# Patient Record
Sex: FEMALE | Race: WHITE | HISPANIC OR LATINO | Employment: FULL TIME | URBAN - METROPOLITAN AREA
[De-identification: names, ages, dates, MRNs, and addresses within clinical notes are randomized per-mention and may not be internally consistent; named-entity substitution may affect disease eponyms.]

---

## 2017-02-07 ENCOUNTER — ALLSCRIPTS OFFICE VISIT (OUTPATIENT)
Dept: OTHER | Facility: OTHER | Age: 47
End: 2017-02-07

## 2017-02-07 DIAGNOSIS — I10 ESSENTIAL (PRIMARY) HYPERTENSION: ICD-10-CM

## 2017-02-24 ENCOUNTER — ALLSCRIPTS OFFICE VISIT (OUTPATIENT)
Dept: OTHER | Facility: OTHER | Age: 47
End: 2017-02-24

## 2017-07-24 ENCOUNTER — ALLSCRIPTS OFFICE VISIT (OUTPATIENT)
Dept: OTHER | Facility: OTHER | Age: 47
End: 2017-07-24

## 2017-07-26 ENCOUNTER — GENERIC CONVERSION - ENCOUNTER (OUTPATIENT)
Dept: OTHER | Facility: OTHER | Age: 47
End: 2017-07-26

## 2017-08-15 ENCOUNTER — APPOINTMENT (EMERGENCY)
Dept: RADIOLOGY | Facility: HOSPITAL | Age: 47
End: 2017-08-15
Payer: COMMERCIAL

## 2017-08-15 ENCOUNTER — HOSPITAL ENCOUNTER (EMERGENCY)
Facility: HOSPITAL | Age: 47
Discharge: HOME | End: 2017-08-15
Attending: EMERGENCY MEDICINE | Admitting: EMERGENCY MEDICINE
Payer: COMMERCIAL

## 2017-08-15 VITALS
DIASTOLIC BLOOD PRESSURE: 74 MMHG | HEIGHT: 60 IN | HEART RATE: 55 BPM | WEIGHT: 160 LBS | BODY MASS INDEX: 31.41 KG/M2 | RESPIRATION RATE: 18 BRPM | SYSTOLIC BLOOD PRESSURE: 167 MMHG | TEMPERATURE: 97.5 F | OXYGEN SATURATION: 100 %

## 2017-08-15 DIAGNOSIS — M72.2 PLANTAR FASCIITIS: Primary | ICD-10-CM

## 2017-08-15 LAB
ANION GAP BLD CALC-SCNC: 18 MMOL/L (ref 4–13)
BUN BLD-MCNC: 20 MG/DL (ref 5–25)
CA-I BLD-SCNC: 1.15 MMOL/L (ref 1.12–1.32)
CHLORIDE BLD-SCNC: 106 MMOL/L (ref 100–108)
CREAT BLD-MCNC: 0.5 MG/DL (ref 0.6–1.3)
GFR SERPL CREATININE-BSD FRML MDRD: 115 ML/MIN/1.73SQ M
GLUCOSE SERPL-MCNC: 104 MG/DL (ref 65–140)
HCT VFR BLD CALC: 41 % (ref 34.8–46.1)
HGB BLDA-MCNC: 13.9 G/DL (ref 11.5–15.4)
PCO2 BLD: 23 MMOL/L (ref 21–32)
POTASSIUM BLD-SCNC: 3.4 MMOL/L (ref 3.5–5.3)
SODIUM BLD-SCNC: 142 MMOL/L (ref 136–145)
SPECIMEN SOURCE: ABNORMAL

## 2017-08-15 PROCEDURE — 85014 HEMATOCRIT: CPT

## 2017-08-15 PROCEDURE — 99284 EMERGENCY DEPT VISIT MOD MDM: CPT

## 2017-08-15 PROCEDURE — 73630 X-RAY EXAM OF FOOT: CPT | Performed by: EMERGENCY MEDICINE

## 2017-08-15 PROCEDURE — 80047 BASIC METABLC PNL IONIZED CA: CPT

## 2017-08-15 RX ORDER — LISINOPRIL 20 MG/1
20 TABLET ORAL DAILY
COMMUNITY
End: 2018-03-02 | Stop reason: SDUPTHER

## 2017-08-15 RX ORDER — IBUPROFEN 400 MG/1
800 TABLET ORAL ONCE
Status: COMPLETED | OUTPATIENT
Start: 2017-08-15 | End: 2017-08-15

## 2017-08-15 RX ORDER — ACETAMINOPHEN 325 MG/1
650 TABLET ORAL ONCE
Status: COMPLETED | OUTPATIENT
Start: 2017-08-15 | End: 2017-08-15

## 2017-08-15 RX ORDER — IBUPROFEN 800 MG/1
800 TABLET ORAL 3 TIMES DAILY
Qty: 21 TABLET | Refills: 0 | Status: SHIPPED | OUTPATIENT
Start: 2017-08-15 | End: 2018-09-20 | Stop reason: ALTCHOICE

## 2017-08-15 RX ORDER — ACETAMINOPHEN 325 MG/1
TABLET ORAL
Status: COMPLETED
Start: 2017-08-15 | End: 2017-08-15

## 2017-08-15 RX ORDER — ONDANSETRON 4 MG/1
4 TABLET, ORALLY DISINTEGRATING ORAL ONCE
Status: DISCONTINUED | OUTPATIENT
Start: 2017-08-15 | End: 2017-08-15

## 2017-08-15 RX ORDER — ASPIRIN 81 MG/1
81 TABLET ORAL DAILY
COMMUNITY

## 2017-08-15 RX ORDER — AMLODIPINE BESYLATE 5 MG/1
TABLET ORAL
COMMUNITY
Start: 2016-03-08 | End: 2018-03-02

## 2017-08-15 RX ADMIN — ACETAMINOPHEN 650 MG: 325 TABLET, FILM COATED ORAL at 18:45

## 2017-08-15 RX ADMIN — IBUPROFEN 800 MG: 400 TABLET ORAL at 21:49

## 2017-08-15 RX ADMIN — ACETAMINOPHEN 650 MG: 325 TABLET ORAL at 18:45

## 2017-08-26 ENCOUNTER — TRANSCRIBE ORDERS (OUTPATIENT)
Dept: ADMINISTRATIVE | Facility: HOSPITAL | Age: 47
End: 2017-08-26

## 2017-08-26 DIAGNOSIS — M72.2 PLANTAR FASCIAL FIBROMATOSIS: Primary | ICD-10-CM

## 2017-08-26 DIAGNOSIS — M79.671 RIGHT FOOT PAIN: ICD-10-CM

## 2017-09-08 ENCOUNTER — HOSPITAL ENCOUNTER (OUTPATIENT)
Dept: RADIOLOGY | Facility: HOSPITAL | Age: 47
Discharge: HOME | End: 2017-09-08
Payer: COMMERCIAL

## 2017-09-08 DIAGNOSIS — M72.2 PLANTAR FASCIAL FIBROMATOSIS: ICD-10-CM

## 2017-09-08 DIAGNOSIS — M79.671 RIGHT FOOT PAIN: ICD-10-CM

## 2017-09-08 PROCEDURE — 73721 MRI JNT OF LWR EXTRE W/O DYE: CPT

## 2017-09-20 ENCOUNTER — ALLSCRIPTS OFFICE VISIT (OUTPATIENT)
Dept: OTHER | Facility: OTHER | Age: 47
End: 2017-09-20

## 2017-09-29 ENCOUNTER — TRANSCRIBE ORDERS (OUTPATIENT)
Dept: ADMINISTRATIVE | Facility: HOSPITAL | Age: 47
End: 2017-09-29

## 2017-09-29 DIAGNOSIS — N64.4 BREAST TENDERNESS: Primary | ICD-10-CM

## 2017-10-05 ENCOUNTER — HOSPITAL ENCOUNTER (OUTPATIENT)
Dept: MAMMOGRAPHY | Facility: CLINIC | Age: 47
Discharge: HOME | End: 2017-10-05
Payer: COMMERCIAL

## 2017-10-05 DIAGNOSIS — N64.4 BREAST TENDERNESS: ICD-10-CM

## 2017-10-05 PROCEDURE — G0204 DX MAMMO INCL CAD BI: HCPCS

## 2017-10-05 PROCEDURE — G0279 TOMOSYNTHESIS, MAMMO: HCPCS

## 2017-10-26 ENCOUNTER — TRANSCRIBE ORDERS (OUTPATIENT)
Dept: ADMINISTRATIVE | Facility: HOSPITAL | Age: 47
End: 2017-10-26

## 2017-10-26 DIAGNOSIS — R20.2 PARESTHESIA: Primary | ICD-10-CM

## 2017-10-31 ENCOUNTER — HOSPITAL ENCOUNTER (OUTPATIENT)
Dept: NEUROLOGY | Facility: AMBULATORY SURGERY CENTER | Age: 47
Discharge: HOME | End: 2017-10-31
Payer: COMMERCIAL

## 2017-10-31 DIAGNOSIS — G89.29 CHRONIC PAIN IN LEFT FOOT: ICD-10-CM

## 2017-10-31 DIAGNOSIS — M79.672 CHRONIC PAIN IN LEFT FOOT: ICD-10-CM

## 2017-10-31 DIAGNOSIS — R20.2 PARESTHESIA: ICD-10-CM

## 2017-10-31 PROCEDURE — 95911 NRV CNDJ TEST 9-10 STUDIES: CPT

## 2017-10-31 PROCEDURE — 95886 MUSC TEST DONE W/N TEST COMP: CPT

## 2017-11-24 ENCOUNTER — APPOINTMENT (OUTPATIENT)
Dept: LAB | Facility: HOSPITAL | Age: 47
End: 2017-11-24
Attending: PODIATRIST
Payer: COMMERCIAL

## 2017-11-24 ENCOUNTER — TRANSCRIBE ORDERS (OUTPATIENT)
Dept: LAB | Facility: HOSPITAL | Age: 47
End: 2017-11-24

## 2017-11-24 DIAGNOSIS — M72.2 PLANTAR FASCIAL FIBROMATOSIS: ICD-10-CM

## 2017-11-24 DIAGNOSIS — M72.2 PLANTAR FASCIAL FIBROMATOSIS: Primary | ICD-10-CM

## 2017-11-24 LAB
ANION GAP SERPL CALCULATED.3IONS-SCNC: 8 MMOL/L (ref 4–13)
BUN SERPL-MCNC: 22 MG/DL (ref 5–25)
CALCIUM SERPL-MCNC: 8.6 MG/DL (ref 8.3–10.1)
CHLORIDE SERPL-SCNC: 106 MMOL/L (ref 100–108)
CO2 SERPL-SCNC: 25 MMOL/L (ref 21–32)
CREAT SERPL-MCNC: 0.94 MG/DL (ref 0.6–1.3)
ERYTHROCYTE [DISTWIDTH] IN BLOOD BY AUTOMATED COUNT: 13.3 % (ref 11.6–15.1)
GFR SERPL CREATININE-BSD FRML MDRD: 72 ML/MIN/1.73SQ M
GLUCOSE SERPL-MCNC: 110 MG/DL (ref 65–140)
HCG SERPL QL: NEGATIVE
HCT VFR BLD AUTO: 38 % (ref 34.8–46.1)
HGB BLD-MCNC: 13.2 G/DL (ref 11.5–15.4)
MCH RBC QN AUTO: 27.8 PG (ref 26.8–34.3)
MCHC RBC AUTO-ENTMCNC: 34.7 G/DL (ref 31.4–37.4)
MCV RBC AUTO: 80 FL (ref 82–98)
PLATELET # BLD AUTO: 295 THOUSANDS/UL (ref 149–390)
PMV BLD AUTO: 11.6 FL (ref 8.9–12.7)
POTASSIUM SERPL-SCNC: 3.4 MMOL/L (ref 3.5–5.3)
RBC # BLD AUTO: 4.75 MILLION/UL (ref 3.81–5.12)
SODIUM SERPL-SCNC: 139 MMOL/L (ref 136–145)
WBC # BLD AUTO: 11.51 THOUSAND/UL (ref 4.31–10.16)

## 2017-11-24 PROCEDURE — 36415 COLL VENOUS BLD VENIPUNCTURE: CPT

## 2017-11-24 PROCEDURE — 84703 CHORIONIC GONADOTROPIN ASSAY: CPT

## 2017-11-24 PROCEDURE — 85027 COMPLETE CBC AUTOMATED: CPT

## 2017-11-24 PROCEDURE — 80048 BASIC METABOLIC PNL TOTAL CA: CPT

## 2017-11-27 ENCOUNTER — ALLSCRIPTS OFFICE VISIT (OUTPATIENT)
Dept: OTHER | Facility: OTHER | Age: 47
End: 2017-11-27

## 2017-11-28 ENCOUNTER — GENERIC CONVERSION - ENCOUNTER (OUTPATIENT)
Dept: OTHER | Facility: OTHER | Age: 47
End: 2017-11-28

## 2017-11-28 NOTE — PROGRESS NOTES
Assessment    1  Preoperative clearance (V72 84) (Z01 818)   2  Plantar fasciitis, left (728 71) (M72 2)   3  Essential (primary) hypertension (401 9) (I10)   4  Dyslipidemia (272 4) (E78 5)    Plan  Essential (primary) hypertension    · Lisinopril 20 MG Oral Tablet; TAKE 1 TABLET DAILY AS DIRECTED    Discussion/Summary  Surgical Clearance: She is at a LOW risk from a cardiovascular standpoint at this time without any additional cardiac testing  Reevaluation needed, if she should present with symptoms prior to surgery/procedure  Surgical clearance faxed to Dr Moon Lloyd Complaint  Patient here for Pre-Ophaving left foot surgery Dec 5thSimon Arturo Ledbetter will be performing the surgery at [facility]      History of Present Illness  Pre-Op Visit (Brief): The patient is being seen for a preoperative visit  The procedure is a(n) left foot surgery with Margarite Canal  The indication for surgery is left plantar fasciitisDec 5 , 2017  Surgical Risk Assessment:  Prior Anesthesia: She had prior anesthesia-- and-- no prior adverse reaction to general anesthesia  Pertinent Past Medical History: no pertinent past medical history  Exercise Capacity: able to walk four blocks without symptoms-- and-- able to walk two flights of stairs without symptoms  Symptoms: no symptoms,-- no chest pain,-- no cough,-- no dyspnea,-- no edema,-- no palpitations-- and-- no wheezing  Pertinent Family History: no family history of an adverse reaction to anesthesia,-- no aneurysm,-- no sudden early deaths-- and-- no stroke  Living Situation: home is secure and supportive and no post-op concerns with her living situation  Review of Systems   Constitutional: No fever, no chills, feels well, no tiredness, no recent weight gain or weight loss  Eyes: No complaints of eye pain, no red eyes, no eyesight problems, no discharge, no dry eyes, no itching of eyes    ENT: no complaints of earache, no loss of hearing, no nose bleeds, no nasal discharge, no sore throat, no hoarseness  Cardiovascular: No complaints of slow heart rate, no fast heart rate, no chest pain, no palpitations, no leg claudication, no lower extremity edema  Respiratory: No complaints of shortness of breath, no wheezing, no cough, no SOB on exertion, no orthopnea, no PND  Gastrointestinal: No complaints of abdominal pain, no constipation, no nausea or vomiting, no diarrhea, no bloody stools  Genitourinary: No complaints of dysuria, no incontinence, no pelvic pain, no dysmenorrhea, no vaginal discharge or bleeding  Musculoskeletal: No complaints of arthralgias, no myalgias, no joint swelling or stiffness, no limb pain or swelling  Integumentary: No complaints of skin rash or lesions, no itching, no skin wounds, no breast pain or lump  Neurological: No complaints of headache, no confusion, no convulsions, no numbness, no dizziness or fainting, no tingling, no limb weakness, no difficulty walking  Psychiatric: Not suicidal, no sleep disturbance, no anxiety or depression, no change in personality, no emotional problems  Endocrine: No complaints of proptosis, no hot flashes, no muscle weakness, no deepening of the voice, no feelings of weakness  Hematologic/Lymphatic: No complaints of swollen glands, no swollen glands in the neck, does not bleed easily, does not bruise easily  Active Problems  1  Anxiety (300 00) (F41 9)   2  Bilateral bunions (727 1) (M21 611,M21 612)   3  Breast tenderness (611 71) (N64 4)   4  Dyslipidemia (272 4) (E78 5)   5  Encounter for gynecological examination with abnormal finding (V72 31) (Z01 411)   6  Essential (primary) hypertension (401 9) (I10)   7  Headache (784 0) (R51)   8  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)   9  Menorrhagia (626 2) (N92 0)   10  Plantar fasciitis, left (728 71) (M72 2)   11  Vaginal discharge (623 5) (N89 8)   12   Valvular disease (424 90) (I38)    Past Medical History   · Acute upper respiratory infection (465 9) (J06 9)   · History of Age At First Period 14 Years Old (Menarche)   · History of Congenital heart defect (746 9) (Q24 9)   · History of Dysuria (788 1) (R30 0)   · History of Endometrial polyp (621 0) (N84 0)   · History of  3 (V22 2) (Z33 1)   · History of cardiac disorder (V12 50) (Z86 79)   · History of low back pain (V13 59) (Z87 39)   · History of vaginal discharge (V13 29) (Z87 42)   · History of Missed  (History)   · History of Shortness of breath (786 05) (R06 02)    The active problems and past medical history were reviewed and updated today  Surgical History   · History of  Section   · History of Endometrial Biopsy By Suction   · History of Gynecologic Services Intrauterine Device (IUD) Insertion   · History of Gynecologic Services Intrauterine Device (IUD) Removal   · History of Heart Surgery    The surgical history was reviewed and updated today  Family History  Mother    · No pertinent family history  Father    · No pertinent family history  Maternal Grandmother    · Family history of Heart Disease (V17 49)  Family History    · Family history of Coronary artery disease    The family history was reviewed and updated today  Social History     · Denied: History of Alcohol Use (History)   · Birth Control Method - Condoms   · Birth Control Method - Withdrawal Method   · Currently sexually active   · Daily Coffee Consumption (1  Cups/Day)   · Denied: History of Drug Use   ·    · Native Language [place]ayan   · Never A Smoker   · Sedentary Lifestyle (V69 0)  The social history was reviewed and updated today  Current Meds   1  AmLODIPine Besylate 5 MG Oral Tablet; TAKE 1 TABLET DAILY; Therapy: 81LHM0229 to (EWXYZMRO:12YFP8633)  Requested for: 49Dom4170; Last Rx:16Dty4308 Ordered   2  Aspirin EC 81 MG Oral Tablet Delayed Release; Take one tablet once daily; Therapy: 69SQY3419-  Requested for: 53EDY8426;  Last ZI:09DMU4639 Ordered   3  Lisinopril 10 MG Oral Tablet; TAKE 1 TABLET BY MOUTH EVERY DAY  Requested for: 97Thl7838; Last Rx:22Nye8221 Ordered   4  MetroNIDAZOLE 500 MG Oral Tablet; sig 1 tab bid x 7 days; Therapy: 43IRK9862 to (Evaluate:27Sep2017)  Requested for: 98AVN5968; Last Rx:95Vgz7523 Ordered   5  Pravastatin Sodium 40 MG Oral Tablet; TAKE (1) TABLET DAILY (FOR CHOLESTEROL); Therapy: 08DFF6174 to (Evaluate:20Jan2018)  Requested for: 19Jfx7523; Last Rx:36Dsp3453 Ordered   6  Tramadol-Acetaminophen 37 5-325 MG Oral Tablet; Take 1 tablets for pain every 12 hrs as needed for pain; Therapy: 74BLQ8805 to (Evaluate:20Qyl4272); Last Rx:64Wgf6468 Ordered    The medication list was reviewed and updated today  Allergies  1  No Known Drug Allergies    Vitals   Recorded: 67MZO0130 02:10PM   Temperature 98 3 F   Heart Rate 76   Respiration 18   Systolic 205   Diastolic 94   Height 4 ft 11 84 in   Weight 168 lb    BMI Calculated 32 98   BSA Calculated 1 73   O2 Saturation 96   LMP 47Qnk7654   Pain Scale 0       Physical Exam   Constitutional  General appearance: No acute distress, well appearing and well nourished  Head and Face  Head and face: Normal    Eyes  Conjunctiva and lids: No swelling, erythema or discharge  Pupils and irises: Equal, round, reactive to light  Ears, Nose, Mouth, and Throat  Otoscopic examination: Tympanic membranes translucent with normal light reflex  Canals patent without erythema  Oropharynx: Normal with no erythema, edema, exudate or lesions  Neck  Neck: Supple, symmetric, trachea midline, no masses  Pulmonary  Respiratory effort: No increased work of breathing or signs of respiratory distress  Auscultation of lungs: Clear to auscultation  Cardiovascular  Auscultation of heart: Normal rate and rhythm, normal S1 and S2, no murmurs  Examination of extremities for edema and/or varicosities: Normal    Chest  Chest: Normal    Abdomen  Abdomen: Non-tender, no masses     Lymphatic  Palpation of lymph nodes in neck: No lymphadenopathy  Musculoskeletal  Gait and station: Normal    Skin  Skin and subcutaneous tissue: Normal without rashes or lesions  Neurologic  Cranial nerves: Cranial nerves II-XII intact  Psychiatric  Mood and affect: Normal        Results/Data  No acute ischemia  Rhythm and rate: normal sinus rhythm  P-waves: the P wave is normal   QRS: right bundle branch block  Comparison to prior ECGs: compared to ECG 2/7/17--   no interval change  End of Encounter Meds    1  Pravastatin Sodium 40 MG Oral Tablet; TAKE (1) TABLET DAILY (FOR CHOLESTEROL); Therapy: 67LCR1350 to (ZSYOTKAZ:41YVP2458)  Requested for: 02Ofw5134; Last Rx:05Kdh2740 Ordered    2  Lisinopril 20 MG Oral Tablet; TAKE 1 TABLET DAILY AS DIRECTED  Requested for: 17FBJ4148; Last Rx:27Nov2017 Ordered    3  AmLODIPine Besylate 5 MG Oral Tablet; TAKE 1 TABLET DAILY; Therapy: 02IND8830 to (Evaluate:20Jan2018)  Requested for: 70Hze9742; Last Rx:34Tkp8792 Ordered    4  Tramadol-Acetaminophen 37 5-325 MG Oral Tablet; Take 1 tablets for pain every 12 hrs as needed for pain; Therapy: 84YMU4065 to (Evaluate:33Oas9269); Last Rx:44Oqu8327 Ordered    5  Aspirin EC 81 MG Oral Tablet Delayed Release; Take one tablet once daily; Therapy: 39HGN3227-  Requested for: 01KDN9479; Last Rx:03Nov2015 Ordered    6  MetroNIDAZOLE 500 MG Oral Tablet; sig 1 tab bid x 7 days; Therapy: 29RWP4313 to (Evaluate:53Ucz7514)  Requested for: 84RNN4649; Last Rx:72Ghw2559 Ordered    Future Appointments    Date/Time Provider Specialty Site   01/09/2018 01:30 PM NOREEN Perales  [place] [address]   09/20/2018 01:20 PM NOREEN Mario   Obstetrics/Gynecology [facility]       Signatures   Electronically signed by : Ignacio Nissen, M D ; Nov 27 2017  5:11PM EST                       (Author)

## 2018-01-12 VITALS
OXYGEN SATURATION: 96 % | HEIGHT: 60 IN | DIASTOLIC BLOOD PRESSURE: 94 MMHG | TEMPERATURE: 98.3 F | BODY MASS INDEX: 32.98 KG/M2 | SYSTOLIC BLOOD PRESSURE: 150 MMHG | WEIGHT: 168 LBS | RESPIRATION RATE: 18 BRPM | HEART RATE: 76 BPM

## 2018-01-13 NOTE — MISCELLANEOUS
Message  Return to work or school:   Saud Wall is under my professional care   She was seen in my office on 07/24/2017   She is able to return to work on  07/28/2017            Signatures   Electronically signed by : NOREEN Javier ; Jul 27 2017 10:13AM EST                       (Author)

## 2018-01-14 VITALS
SYSTOLIC BLOOD PRESSURE: 152 MMHG | OXYGEN SATURATION: 98 % | HEART RATE: 73 BPM | DIASTOLIC BLOOD PRESSURE: 90 MMHG | BODY MASS INDEX: 33.49 KG/M2 | RESPIRATION RATE: 20 BRPM | WEIGHT: 166.13 LBS | HEIGHT: 59 IN | TEMPERATURE: 98.4 F

## 2018-01-14 VITALS
HEART RATE: 66 BPM | BODY MASS INDEX: 32.2 KG/M2 | SYSTOLIC BLOOD PRESSURE: 126 MMHG | HEIGHT: 60 IN | WEIGHT: 164 LBS | DIASTOLIC BLOOD PRESSURE: 84 MMHG

## 2018-01-14 VITALS
SYSTOLIC BLOOD PRESSURE: 190 MMHG | WEIGHT: 166 LBS | TEMPERATURE: 97.9 F | HEIGHT: 59 IN | DIASTOLIC BLOOD PRESSURE: 88 MMHG | HEART RATE: 71 BPM | BODY MASS INDEX: 33.47 KG/M2

## 2018-01-14 VITALS
HEIGHT: 59 IN | DIASTOLIC BLOOD PRESSURE: 98 MMHG | SYSTOLIC BLOOD PRESSURE: 150 MMHG | WEIGHT: 168 LBS | BODY MASS INDEX: 33.87 KG/M2

## 2018-03-02 ENCOUNTER — OFFICE VISIT (OUTPATIENT)
Dept: CARDIOLOGY CLINIC | Facility: CLINIC | Age: 48
End: 2018-03-02
Payer: COMMERCIAL

## 2018-03-02 VITALS
HEART RATE: 73 BPM | WEIGHT: 166 LBS | DIASTOLIC BLOOD PRESSURE: 88 MMHG | BODY MASS INDEX: 32.59 KG/M2 | HEIGHT: 60 IN | SYSTOLIC BLOOD PRESSURE: 164 MMHG

## 2018-03-02 DIAGNOSIS — Q21.1 ASD SECUNDUM: Chronic | ICD-10-CM

## 2018-03-02 DIAGNOSIS — I10 HYPERTENSION, ESSENTIAL, BENIGN: Primary | ICD-10-CM

## 2018-03-02 PROBLEM — Q21.11 ASD SECUNDUM: Chronic | Status: ACTIVE | Noted: 2018-03-02

## 2018-03-02 PROCEDURE — 99213 OFFICE O/P EST LOW 20 MIN: CPT | Performed by: INTERNAL MEDICINE

## 2018-03-02 RX ORDER — PRAVASTATIN SODIUM 40 MG
TABLET ORAL
COMMUNITY
Start: 2016-03-08 | End: 2018-04-10 | Stop reason: SDUPTHER

## 2018-03-02 RX ORDER — LISINOPRIL 20 MG/1
20 TABLET ORAL DAILY
Qty: 90 TABLET | Refills: 4 | Status: SHIPPED | OUTPATIENT
Start: 2018-03-02 | End: 2019-07-02 | Stop reason: SDUPTHER

## 2018-03-02 NOTE — PROGRESS NOTES
Cardiology Follow Up    Sheralyn Fothergill  1970  [id number]  HEART & VASCULAR [facility]  [address] [place] [address] [address]    1  Hypertension, essential, benign     2  ASD secundum         Interval History:  Cardiology follow-up  Patient is doing well  Denies any chest pain or dyspnea  She did undergo foot surgery 2 months ago and she has been minimally ambulatory because of that  Unfortunately he did did suffer a fall today while she was coming to the office, fortunately she did not suffer significant trauma  Patient Active Problem List   Diagnosis    ASD secundum     No past medical history on file  Social History     Social History    Marital status: /Civil Union     Spouse name: N/A    Number of children: N/A    Years of education: N/A     Occupational History    Not on file  Social History Main Topics    Smoking status: Never Smoker    Smokeless tobacco: Not on file    Alcohol use No    Drug use: No    Sexual activity: Not on file     Other Topics Concern    Not on file     Social History Narrative    No narrative on file      No family history on file    Past Surgical History:   Procedure Laterality Date    CARDIAC SURGERY         Current Outpatient Prescriptions:     aspirin (ECOTRIN LOW STRENGTH) 81 mg EC tablet, Take 81 mg by mouth daily, Disp: , Rfl:     lisinopril (ZESTRIL) 20 mg tablet, Take 20 mg by mouth daily  , Disp: , Rfl:     pravastatin (PRAVACHOL) 40 mg tablet, Take by mouth, Disp: , Rfl:     ibuprofen (MOTRIN) 800 mg tablet, Take 1 tablet by mouth 3 (three) times a day for 7 days, Disp: 21 tablet, Rfl: 0  No Known Allergies    Labs:  Appointment on 11/24/2017   Component Date Value    WBC 11/24/2017 11 51*    RBC 11/24/2017 4 75     Hemoglobin 11/24/2017 13 2     Hematocrit 11/24/2017 38 0     MCV 11/24/2017 80*    MCH 11/24/2017 27 8     MCHC 11/24/2017 34 7     RDW 11/24/2017 13 3     Platelets 21/85/9447 295     MPV 11/24/2017 11 6     Sodium 11/24/2017 139     Potassium 11/24/2017 3 4*    Chloride 11/24/2017 106     CO2 11/24/2017 25     Anion Gap 11/24/2017 8     BUN 11/24/2017 22     Creatinine 11/24/2017 0 94     Glucose 11/24/2017 110     Calcium 11/24/2017 8 6     eGFR 11/24/2017 72     Preg, Serum 11/24/2017 Negative      Imaging: No results found  Review of Systems:  Review of Systems   Constitutional: Negative for unexpected weight change  Respiratory: Negative for apnea, cough, shortness of breath, wheezing and stridor  Cardiovascular: Negative for chest pain, palpitations and leg swelling  Musculoskeletal: Positive for arthralgias  Neurological: Negative for dizziness and syncope  Physical Exam:  Physical Exam   Constitutional: She appears well-developed and well-nourished  No distress  Cardiovascular: Normal rate, regular rhythm, normal heart sounds and intact distal pulses  Exam reveals no gallop and no friction rub  No murmur heard  Pulmonary/Chest: Effort normal and breath sounds normal  No respiratory distress  She has no wheezes  She has no rales  She exhibits no tenderness  Neurological: She is alert  Skin: She is not diaphoretic  Psychiatric: She has a normal mood and affect  Discussion/Summary:  Congenital heart disease  Status post cardiac surgery at age 31  Possibly ASD closure     Cardiac catheterization 2005 revealed normal coronary arteries  Normal right heart pressures  No evidence of a step-up of oxygenation suggested no evidence of any intracardiac shunting echocardiogram 2015 revealed normal left ventricular and right ventricular systolic functions  And no evidence of volume or pressure overload load of the right heart chambers  Stress test 2015 was negative for ischemia  Continue clinical regimen

## 2018-04-04 DIAGNOSIS — M72.2 PLANTAR FASCIITIS: Primary | ICD-10-CM

## 2018-04-09 ENCOUNTER — OFFICE VISIT (OUTPATIENT)
Dept: MULTI SPECIALTY CLINIC | Facility: CLINIC | Age: 48
End: 2018-04-09
Payer: COMMERCIAL

## 2018-04-09 VITALS
SYSTOLIC BLOOD PRESSURE: 132 MMHG | BODY MASS INDEX: 34.02 KG/M2 | HEART RATE: 84 BPM | TEMPERATURE: 98.1 F | WEIGHT: 173.28 LBS | DIASTOLIC BLOOD PRESSURE: 100 MMHG | HEIGHT: 60 IN

## 2018-04-09 DIAGNOSIS — M72.2 PLANTAR FASCIITIS: ICD-10-CM

## 2018-04-09 PROCEDURE — 99203 OFFICE O/P NEW LOW 30 MIN: CPT | Performed by: PODIATRIST

## 2018-04-09 RX ORDER — PREDNISONE 10 MG/1
TABLET ORAL
Refills: 0 | COMMUNITY
Start: 2018-03-17 | End: 2018-09-20 | Stop reason: ALTCHOICE

## 2018-04-09 RX ORDER — MELOXICAM 7.5 MG/1
TABLET ORAL
Refills: 0 | COMMUNITY
Start: 2018-03-27 | End: 2019-07-02 | Stop reason: ALTCHOICE

## 2018-04-09 NOTE — PROGRESS NOTES
Podiatry Clinic Visit  Corina Shelby 51 y o  female MRN: [id number]  Encounter: [id number]    Assessment/Plan     Assessment:  1  Plantar fasciitis, left    Plan:  - Pt seen and examined  - discussed etiology of plantar fasciitis  Discussed importance of stretching at least 2x daily, and importance of wearing supportive shoes  Advised pt to do stretching exercises first thing in the AM  - c/w PT  - advised pt to obtain OTC orthotics such as powersteps or superfeet  - rtc 1 month for possible surgical scheduling    History of Present Illness     HPI:  Corina Shelby is a 51 y o  female who presents with painful left plantar heel for about 12 months  Pt states that it is most painful in the morning or after a period of rest  Pt has tried injections, OTC Dr John Decker inserts for the pain  Pt has gotten foot surgery with Dr Theodora Lockwood for her tarsal tunnel syndrome 4 months ago which has not helped with the heel pain that she has  She states she had to come to [facility] due to insurance reasons and could not follow up with Dr Theodora Lockwood  Pt states she has been seeing PT for numerous exercises and goes 3x week for the last 2 months with only some improvement in pain  Pt states that she does stretching exercises at home but not in the AM  Pt states she normally wears sneakers  and ambulates to clinic in sneakers    Pt denies recent nausea, vomiting, fever, chills, shortness of breath or chest pain  Consults  Review of Systems   Constitutional: Negative  HENT: Negative  Eyes: Negative  Respiratory: Negative  Cardiovascular: Negative  Gastrointestinal: Negative  Musculoskeletal: left heel pain   Skin: Negative  Neurological: Negative  Historical Information   History reviewed  No pertinent past medical history    Past Surgical History:   Procedure Laterality Date    CARDIAC SURGERY       Social History   History   Alcohol Use No     History   Drug Use No     History   Smoking Status    Never Smoker Smokeless Tobacco    Not on file     Family History: History reviewed  No pertinent family history  Meds/Allergies     (Not in a hospital admission)  No Known Allergies    Objective   First Vitals:   @VSFIRST2(5,8,6,7,9,11,14,10:FIRST)@    Current Vitals:   Blood Pressure: 132/100 (04/09/18 1532)  Pulse: 84 (04/09/18 1532)  Temperature: 98 1 °F (36 7 °C) (04/09/18 1532)  Temp Source: Oral (04/09/18 1532)  Height: 5' (152 4 cm) (04/09/18 1532)  Weight - Scale: 78 6 kg (173 lb 4 5 oz) (04/09/18 1532)        /100 (BP Location: Right arm, Patient Position: Sitting, Cuff Size: Adult)   Pulse 84   Temp 98 1 °F (36 7 °C) (Oral)   Ht 5' (1 524 m)   Wt 78 6 kg (173 lb 4 5 oz)   BMI 33 84 kg/m²     General Appearance:    Alert, cooperative, no distress   Extremities:   MMT is 5/5 to all compartments of the LE, Digital ROM is intact, Pain upon palpation of left plantar tubercle of calcaneus  Equinus deformity noted bilateral   Pulses:   R DP is +2/4, R PT is +1/4, L DP is +2/4, L PT is +1/4, CFT< 3sec to all digits   Skin:   No open lesions, no edema, no erythema, no clinical signs of infection  No maceration in interspaces  Skin is of normal turgor  Surgical cicatrix noted on L medial ankle   Neurologic:    Gross sensation is intact             Imaging: I have personally reviewed pertinent films in PACS

## 2018-04-09 NOTE — PROGRESS NOTES
Assessment/Plan:    No problem-specific Assessment & Plan notes found for this encounter  Diagnoses and all orders for this visit:    Dyslipidemia  -     pravastatin (PRAVACHOL) 40 mg tablet; Take 1 tablet (40 mg total) by mouth daily  -     HEMOGLOBIN A1C W/ EAG ESTIMATION; Future  -     Lipid Panel with Direct LDL reflex; Future  -     Comprehensive metabolic panel; Future    Essential (primary) hypertension  -     HEMOGLOBIN A1C W/ EAG ESTIMATION; Future  -     Lipid Panel with Direct LDL reflex; Future  -     Comprehensive metabolic panel; Future        Get labs will call w results  FU 6 mo     Subjective:   Pt here for follow up visit  don't have any labs done nor ordered  Last office visit was for pre-operative evaluation  Pt has no new issues to discuss     Patient ID: Isabel Lea is a 51 y o  female  HPI  Pt presents for chronic follow up HTN, HLD  She has been under eval and treatment by podiatrist for left plantar pain  She is S/p surgery in Nov 2017  She is doing pt and still out of work due to pain  Htn she is taking lisinoril, she states controled at home  Slightly elavted diastolic today could be due to left foot pain  Needs updated labs    UTD with mammo, PAP by gyn    The following portions of the patient's history were reviewed and updated as appropriate: allergies, current medications, past family history, past medical history, past social history, past surgical history and problem list     Review of Systems   Constitutional: Negative for activity change and appetite change  Respiratory: Negative  Cardiovascular: Negative  Gastrointestinal: Negative  Genitourinary: Negative  Musculoskeletal: Negative for arthralgias  Skin: Negative for rash  Psychiatric/Behavioral: Negative            Objective:      /96   Pulse 79   Temp 98 3 °F (36 8 °C)   Resp (!) 24   Ht 4' 11 45" (1 51 m)   Wt 77 1 kg (170 lb)   SpO2 98%   BMI 33 82 kg/m²          Physical Exam Constitutional: She is oriented to person, place, and time  She appears well-developed and well-nourished  HENT:   Head: Normocephalic  Eyes: Conjunctivae are normal    Cardiovascular: Normal rate, regular rhythm and normal heart sounds  Pulmonary/Chest: Effort normal and breath sounds normal  No respiratory distress  She has no wheezes  She has no rales  Abdominal: Soft  Bowel sounds are normal  She exhibits no distension  There is no tenderness  Neurological: She is alert and oriented to person, place, and time  Psychiatric: She has a normal mood and affect   Her behavior is normal

## 2018-04-10 ENCOUNTER — OFFICE VISIT (OUTPATIENT)
Dept: FAMILY MEDICINE CLINIC | Facility: CLINIC | Age: 48
End: 2018-04-10
Payer: COMMERCIAL

## 2018-04-10 VITALS
BODY MASS INDEX: 34.27 KG/M2 | RESPIRATION RATE: 20 BRPM | OXYGEN SATURATION: 98 % | TEMPERATURE: 98.3 F | SYSTOLIC BLOOD PRESSURE: 148 MMHG | HEIGHT: 59 IN | WEIGHT: 170 LBS | HEART RATE: 79 BPM | DIASTOLIC BLOOD PRESSURE: 96 MMHG

## 2018-04-10 DIAGNOSIS — I10 ESSENTIAL (PRIMARY) HYPERTENSION: ICD-10-CM

## 2018-04-10 DIAGNOSIS — E78.5 DYSLIPIDEMIA: Primary | ICD-10-CM

## 2018-04-10 PROBLEM — E66.9 OBESITY (BMI 30-39.9): Status: ACTIVE | Noted: 2018-04-10

## 2018-04-10 PROBLEM — M72.2 PLANTAR FASCIITIS, LEFT: Status: ACTIVE | Noted: 2017-07-24

## 2018-04-10 PROCEDURE — 99214 OFFICE O/P EST MOD 30 MIN: CPT | Performed by: FAMILY MEDICINE

## 2018-04-10 RX ORDER — PRAVASTATIN SODIUM 40 MG
40 TABLET ORAL DAILY
Qty: 90 TABLET | Refills: 1 | Status: SHIPPED | OUTPATIENT
Start: 2018-04-10 | End: 2019-08-06 | Stop reason: SDUPTHER

## 2018-05-14 ENCOUNTER — OFFICE VISIT (OUTPATIENT)
Dept: MULTI SPECIALTY CLINIC | Facility: CLINIC | Age: 48
End: 2018-05-14
Payer: COMMERCIAL

## 2018-05-14 VITALS
HEIGHT: 60 IN | SYSTOLIC BLOOD PRESSURE: 140 MMHG | DIASTOLIC BLOOD PRESSURE: 100 MMHG | BODY MASS INDEX: 33.07 KG/M2 | TEMPERATURE: 97.9 F | HEART RATE: 80 BPM | WEIGHT: 168.43 LBS

## 2018-05-14 DIAGNOSIS — M72.2 PLANTAR FASCIITIS OF LEFT FOOT: Primary | ICD-10-CM

## 2018-05-14 PROCEDURE — 99213 OFFICE O/P EST LOW 20 MIN: CPT | Performed by: PODIATRIST

## 2018-05-14 RX ORDER — LISINOPRIL 10 MG/1
TABLET ORAL
COMMUNITY
End: 2018-09-20 | Stop reason: SDUPTHER

## 2018-05-14 RX ORDER — AMLODIPINE BESYLATE 5 MG/1
TABLET ORAL
COMMUNITY
End: 2018-09-20 | Stop reason: ALTCHOICE

## 2018-05-14 RX ORDER — LIDOCAINE 50 MG/G
OINTMENT TOPICAL
COMMUNITY
End: 2018-09-20 | Stop reason: ALTCHOICE

## 2018-05-14 RX ORDER — DOXEPIN HYDROCHLORIDE 50 MG/G
CREAM TOPICAL
COMMUNITY
End: 2018-09-20 | Stop reason: ALTCHOICE

## 2018-05-14 RX ORDER — NAPROXEN 500 MG/1
500 TABLET ORAL 2 TIMES DAILY WITH MEALS
Qty: 30 TABLET | Refills: 0 | Status: SHIPPED | OUTPATIENT
Start: 2018-05-14 | End: 2018-09-20 | Stop reason: ALTCHOICE

## 2018-05-14 RX ORDER — DICLOFENAC SODIUM 75 MG/1
TABLET, DELAYED RELEASE ORAL
COMMUNITY
End: 2018-09-20 | Stop reason: ALTCHOICE

## 2018-05-14 RX ORDER — METRONIDAZOLE 500 MG/1
TABLET ORAL
COMMUNITY
End: 2018-09-20 | Stop reason: ALTCHOICE

## 2018-05-14 NOTE — PROGRESS NOTES
Podiatry Clinic Visit  Isabel Lea 51 y o  female MRN: [id number]  Encounter: [id number]    Assessment/Plan     Assessment:  1  Plantar fasciitis, left    Plan:  - Pt seen and examined  - c/w stretching exercises  - advised use of night splint  - advised pt to obtain OTC orthotics such as powersteps or superfeet and to remove sneaker's inserts before inserting otc inserts  - pt may take alleve with meals, denies history of GI issues  - agreeable to plan  - rtc 6 weeks    History of Present Illness     HPI:  Isabel Lea is a 51 y o  female who presents with painful left plantar heel for 12 months  Pt states that it is most painful in the morning or after a period of rest  She was here last month for evaluation of heel pain and notices an improvement in the pain as she has been doing the stretching exercises in the AM  She states she has a night splint that she had gotten previously but only used it 3-4x so far  She is willing to try it more consistently now that she notices an improvement in AM stretching exercise  She states she last saw dr Elif Vaughn 2 weeks ago for her left toenail, but would like to switch providers completely since [facility] is much closer to her house  She states that there is overall an 50% improvement since her worst pain in the last year  Pt willing to continue conservative tx as this time  Pt states she normally wears sneakers and ambulates to clinic in sneakers  Pt denies recent nausea, vomiting, fever, chills, shortness of breath or chest pain  Consults  Review of Systems   Constitutional: Negative  HENT: Negative  Eyes: Negative  Respiratory: Negative  Cardiovascular: Negative  Gastrointestinal: Negative  Musculoskeletal: heel pain  Skin: Negative  Neurological: Negative  Historical Information   No past medical history on file    Past Surgical History:   Procedure Laterality Date    CARDIAC SURGERY       Social History   History   Alcohol Use No     History Drug Use No     History   Smoking Status    Never Smoker   Smokeless Tobacco    Never Used     Family History: No family history on file  Meds/Allergies     (Not in a hospital admission)  No Known Allergies    Objective   First Vitals:   @VSFIRST2(5,8,6,7,9,11,14,10:FIRST)@    Current Vitals:   Blood Pressure: 140/100 (05/14/18 1531)  Pulse: 80 (05/14/18 1531)  Temperature: 97 9 °F (36 6 °C) (05/14/18 1531)  Temp Source: Oral (05/14/18 1531)  Height: 4' 11 75" (151 8 cm) (05/14/18 1531)  Weight - Scale: 76 4 kg (168 lb 6 9 oz) (05/14/18 1531)        /100 (BP Location: Left arm, Patient Position: Sitting, Cuff Size: Standard)   Pulse 80   Temp 97 9 °F (36 6 °C) (Oral)   Ht 4' 11 75" (1 518 m)   Wt 76 4 kg (168 lb 6 9 oz)   BMI 33 17 kg/m²     General Appearance:    Alert, cooperative, no distress   Extremities:   MMT is 5/5 to all compartments of the LE, Digital ROM is intact, Pain upon palpation of left plantar tubercle of calcaneus  Equinus deformity noted bilateral   Pulses:   R DP is +2/4, R PT is +1/4, L DP is +2/4, L PT is +1/4, CFT< 3sec to all digits   Skin:   No open lesions, no edema, no erythema, no clinical signs of infection  No maceration in interspaces  Skin is of normal turgor  Medial left ankle cicatrix  Discoloration to lateral border of left 2nd toenail   Neurologic:    Gross sensation is intact  Protective sensation is intact   Tinel's sign positive on left           Imaging: I have personally reviewed pertinent films in PACS

## 2018-06-25 ENCOUNTER — OFFICE VISIT (OUTPATIENT)
Dept: MULTI SPECIALTY CLINIC | Facility: CLINIC | Age: 48
End: 2018-06-25
Payer: COMMERCIAL

## 2018-06-25 VITALS
BODY MASS INDEX: 33.78 KG/M2 | WEIGHT: 167.55 LBS | HEIGHT: 59 IN | SYSTOLIC BLOOD PRESSURE: 136 MMHG | DIASTOLIC BLOOD PRESSURE: 80 MMHG | TEMPERATURE: 97.2 F | HEART RATE: 84 BPM

## 2018-06-25 DIAGNOSIS — G57.52 TARSAL TUNNEL SYNDROME OF LEFT SIDE: ICD-10-CM

## 2018-06-25 DIAGNOSIS — M72.2 PLANTAR FASCIITIS: Primary | ICD-10-CM

## 2018-06-25 PROCEDURE — 99213 OFFICE O/P EST LOW 20 MIN: CPT | Performed by: PODIATRIST

## 2018-06-25 NOTE — PROGRESS NOTES
Podiatry Clinic Visit  Elroy Olivera 51 y o  female MRN: [id number]  Encounter: [id number]    Assessment/Plan     Assessment:  1  Plantar fasciitis, B/L (L>R)  2  L tarsal tunnel syndrome    Plan:  - Pt seen and examined  - injection performed to left medial heel/ankle consisting of 0 5 of kenalog 40 and 1 5cc of lidocaine without epi  Pt tolerated well  - advised to continue stretching exercises and wear otc arch supports  - Right heel injection will be offered if pain does not improve next visit  - rtc 1 month    History of Present Illness     HPI:  Elroy Olivera is a 51 y o  female who presents with painful left plantar heel  Pt states that it is most painful in the morning or after a period of rest  Pt has tried the stretching exercises and OTC inserts for the pain, which have helped a little bit but does not provide the amount of relief she'd like  She continues to complain of medial ankle pain after tarsal tunnel release surgery 6 months ago  She also states that she is starting to have right heel pain, but the pain is minimal      Pt denies recent nausea, vomiting, fever, chills, shortness of breath or chest pain  Consults  Review of Systems   Constitutional: Negative  HENT: Negative  Eyes: Negative  Respiratory: Negative  Cardiovascular: Negative  Gastrointestinal: Negative  Musculoskeletal: B/l foot pain  Skin: Negative  Neurological: Negative          Historical Information   Past Medical History:   Diagnosis Date    Cardiac disorder     Congenital heart defect     last assessed 3/3/15, resolved 3/3/15    Endometrial polyp      Past Surgical History:   Procedure Laterality Date    CARDIAC SURGERY      valve repair      SECTION      x 2    ENDOMETRIAL BIOPSY      by suction     INSERTION OF INTRAUTERINE DEVICE (IUD)      REMOVAL OF INTRAUTERINE DEVICE (IUD)       Social History   History   Alcohol Use No     History   Drug Use No     History   Smoking Status    Never Smoker   Smokeless Tobacco    Never Used     Family History:   Family History   Problem Relation Age of Onset    No Known Problems Mother     No Known Problems Father     Heart disease Maternal Grandmother     Coronary artery disease Family        Meds/Allergies     (Not in a hospital admission)  No Known Allergies    Objective   First Vitals:   @VSFIRST2(5,8,6,7,9,11,14,10:FIRST)@    Current Vitals: There were no vitals taken for this visit  General Appearance:    Alert, cooperative, no distress   Extremities:   MMT is 5/5 to all compartments of the LE, Digital ROM is intact, Pain upon palpation of left plantar tubercle of calcaneus  Mild pain noted with palpation of the right plantar tubercle of the calcaneus  Equinus deformity noted bilateral   Pulses:   R DP is +2/4, R PT is +1/4, L DP is +2/4, L PT is +1/4, CFT< 3sec to all digits   Skin:   No open lesions, no edema, no erythema, no clinical signs of infection  No maceration in interspaces  Skin is of normal turgor  Surgical cicatrix noted on medial ankle   Neurologic:    Gross sensation is intact  Protective sensation is intact   Positive tinel's sign on left           Imaging: I have personally reviewed pertinent films in PACS

## 2018-07-30 ENCOUNTER — OFFICE VISIT (OUTPATIENT)
Dept: MULTI SPECIALTY CLINIC | Facility: CLINIC | Age: 48
End: 2018-07-30
Payer: COMMERCIAL

## 2018-07-30 VITALS
HEIGHT: 59 IN | HEART RATE: 72 BPM | SYSTOLIC BLOOD PRESSURE: 150 MMHG | DIASTOLIC BLOOD PRESSURE: 100 MMHG | BODY MASS INDEX: 33.87 KG/M2 | WEIGHT: 167.99 LBS | TEMPERATURE: 97.8 F

## 2018-07-30 DIAGNOSIS — M76.72 PERONEAL TENDINITIS OF LEFT LOWER LEG: Primary | ICD-10-CM

## 2018-07-30 DIAGNOSIS — M72.2 PLANTAR FASCIITIS, LEFT: ICD-10-CM

## 2018-07-30 PROCEDURE — 99213 OFFICE O/P EST LOW 20 MIN: CPT | Performed by: PODIATRIST

## 2018-07-30 RX ORDER — DIAPER,BRIEF,INFANT-TODD,DISP
EACH MISCELLANEOUS
COMMUNITY
Start: 2016-06-21 | End: 2018-09-20 | Stop reason: ALTCHOICE

## 2018-07-30 NOTE — PROGRESS NOTES
Podiatry Clinic Visit  Miguel Angel Bahena 51 y o  female MRN: [id number]  Encounter: [id number]    Assessment/Plan     Assessment:  1  Plantar fasciitis, B/L (L>R)  2  Left peroneal tendinitis   3  L tarsal tunnel syndrome    Plan:  - Patient was seen/examined  All questions and concerns addressed  - After verbal consent was obtained, injection performed to left medial-plantar heel consisting of 1 5cc of betamethasone, 0 5cc lidocaine, and 1 0cc marcaine  Pt tolerated procedure well  - Script for PT given  - Excusal for work tomorrow (18)  - F/U 6-8 weeks after PT  History of Present Illness     HPI:  Miguel Angel Bahena is a 51 y o  female who presents with left heel and lateral foot pain  Lateral foot pain started since last appointment as she had been walking differently due to the heel pain  Most painful at first step in the morning  Has tried OTC inserts, supportive shoe gear  Last appointment she had an injection which she states helped with the pain, from a 7/10 to a 5/10  The patient denies any nausea, vomiting, fever, chills, shortness of breath, or chest pains  Review of Systems   Constitutional: Negative  HENT: Negative  Eyes: Negative  Respiratory: Negative  Cardiovascular: Negative  Gastrointestinal: Negative  Musculoskeletal: pain on palpation of left heel   Skin: Negative  Neurological: Negative          Historical Information   Past Medical History:   Diagnosis Date    Cardiac disorder     Congenital heart defect     last assessed 3/3/15, resolved 3/3/15    Endometrial polyp      Past Surgical History:   Procedure Laterality Date    CARDIAC SURGERY      valve repair      SECTION      x 2    ENDOMETRIAL BIOPSY      by suction     INSERTION OF INTRAUTERINE DEVICE (IUD)      REMOVAL OF INTRAUTERINE DEVICE (IUD)       Social History   History   Alcohol Use No     History   Drug Use No     History   Smoking Status    Never Smoker   Smokeless Tobacco    Never Used Family History:   Family History   Problem Relation Age of Onset    No Known Problems Mother     No Known Problems Father     Heart disease Maternal Grandmother     Coronary artery disease Family        Meds/Allergies     (Not in a hospital admission)  No Known Allergies    Objective     Current Vitals:   Blood Pressure: 150/100 (07/30/18 1607)  Pulse: 72 (07/30/18 1607)  Temperature: 97 8 °F (36 6 °C) (07/30/18 1607)  Temp Source: Oral (07/30/18 1607)  Height: 4' 11" (149 9 cm) (07/30/18 1607)  Weight - Scale: 76 2 kg (167 lb 15 9 oz) (07/30/18 1607)        /100 (BP Location: Left arm, Patient Position: Sitting, Cuff Size: Standard)   Pulse 72   Temp 97 8 °F (36 6 °C) (Oral)   Ht 4' 11" (1 499 m)   Wt 76 2 kg (167 lb 15 9 oz)   BMI 33 93 kg/m²       Lower Extremity Exam:    Musculoskeletal:  MMT is 5/5 to all compartments of the LE, +0/4 edema B/L, Digital ROM is intact, Pain upon palpation of left plantar tubercle of calcaneus  Pain on palpation of left peroneal tendon from posterior lateral malleolus to 5th met base  Mild pain noted with palpation of the right plantar tubercle of the calcaneus  Equinus deformity noted bilateral     Pulses:   R DP is +2/4, R PT is +2/4, L DP is +2/4, L PT is +2/4, CFT< 3sec to all digits  Pedal hair is Present     Skin:  No open Lesions  Skin of the LE is normal texture, turgor  Neurologic:  Gross sensation is intact

## 2018-07-30 NOTE — LETTER
July 30, 2018     Patient: Ana Collier   YOB: 1970   Date of Visit: 7/30/2018       To Whom it May Concern:    Vikki Kauffman is under my professional care  She was seen in my office on 7/30/2018  She may return to work on 08/01/18  If you have any questions or concerns, please don't hesitate to call           Sincerely,          Ricky Wallace DPM        CC: No Recipients

## 2018-09-10 ENCOUNTER — TELEPHONE (OUTPATIENT)
Dept: INTERNAL MEDICINE CLINIC | Facility: CLINIC | Age: 48
End: 2018-09-10

## 2018-09-20 ENCOUNTER — ANNUAL EXAM (OUTPATIENT)
Dept: OBGYN CLINIC | Facility: CLINIC | Age: 48
End: 2018-09-20
Payer: COMMERCIAL

## 2018-09-20 VITALS
WEIGHT: 167 LBS | SYSTOLIC BLOOD PRESSURE: 140 MMHG | HEIGHT: 57 IN | DIASTOLIC BLOOD PRESSURE: 80 MMHG | BODY MASS INDEX: 36.03 KG/M2

## 2018-09-20 DIAGNOSIS — R92.2 DENSE BREAST TISSUE: ICD-10-CM

## 2018-09-20 DIAGNOSIS — Z01.419 ENCOUNTER FOR GYNECOLOGICAL EXAMINATION WITHOUT ABNORMAL FINDING: Primary | ICD-10-CM

## 2018-09-20 DIAGNOSIS — Z12.39 SCREENING FOR MALIGNANT NEOPLASM OF BREAST: ICD-10-CM

## 2018-09-20 PROCEDURE — S0612 ANNUAL GYNECOLOGICAL EXAMINA: HCPCS | Performed by: OBSTETRICS & GYNECOLOGY

## 2018-09-20 NOTE — PROGRESS NOTES
Jeffrey Antunez  1970      CC:  Yearly exam    S:  51 y o  female here for yearly exam  Her cycles are irregular, not heavy or crampy  She is sexually active  She uses nothing for contraception  Discussed expectations, when to call  Last Pap 2/10/2015 normal/negative HPV  Last Mammo 10/5/2017 BIRAD-2      Current Outpatient Prescriptions:     aspirin (ECOTRIN LOW STRENGTH) 81 mg EC tablet, Take 81 mg by mouth daily, Disp: , Rfl:     lisinopril (ZESTRIL) 20 mg tablet, Take 1 tablet (20 mg total) by mouth daily, Disp: 90 tablet, Rfl: 4    meloxicam (MOBIC) 7 5 mg tablet, TAKE 1 TABLET BY MOUTH TWICE A DAY WITH MEALS FOR 14 DAYS, Disp: , Rfl: 0    pravastatin (PRAVACHOL) 40 mg tablet, Take 1 tablet (40 mg total) by mouth daily, Disp: 90 tablet, Rfl: 1  Social History     Social History    Marital status: /Civil Union     Spouse name: N/A    Number of children: N/A    Years of education: N/A     Occupational History    Not on file  Social History Main Topics    Smoking status: Never Smoker    Smokeless tobacco: Never Used    Alcohol use No    Drug use: No    Sexual activity: Yes     Partners: Male     Birth control/ protection: None      Comment:      Other Topics Concern    Not on file     Social History Narrative    Daily coffee consumption, (1 cup/day)     Sedentary lifestyle      Family History   Problem Relation Age of Onset    Heart disease Mother     No Known Problems Father     Heart disease Maternal Grandmother     Coronary artery disease Family       Past Medical History:   Diagnosis Date    Cardiac disorder     Congenital heart defect     last assessed 3/3/15, resolved 3/3/15    Endometrial polyp         O:  Blood pressure 140/80, height 4' 9" (1 448 m), weight 75 8 kg (167 lb), last menstrual period 09/12/2018      Patient appears well and is not in distress  Neck is supple without masses  Breasts are symmetrical without mass, tenderness, nipple discharge, skin changes or adenopathy  Abdomen is soft and nontender without masses  External genitals are normal without lesions or rashes  Vagina is normal without discharge or bleeding  Cervix is normal without discharge or lesion  Uterus is normal, mobile, nontender without palpable mass  Adnexa are normal, nontender, without palpable mass  A:  Yearly exam      P:   Pap due 2020   Mammo slip provided    RTO one year for yearly exam or sooner as needed

## 2018-11-19 ENCOUNTER — HOSPITAL ENCOUNTER (OUTPATIENT)
Dept: MAMMOGRAPHY | Facility: CLINIC | Age: 48
Discharge: HOME | End: 2018-11-19
Payer: COMMERCIAL

## 2018-11-19 VITALS — BODY MASS INDEX: 36.03 KG/M2 | HEIGHT: 57 IN | WEIGHT: 167 LBS

## 2018-11-19 DIAGNOSIS — Z12.39 SCREENING FOR MALIGNANT NEOPLASM OF BREAST: ICD-10-CM

## 2018-11-19 DIAGNOSIS — R92.2 DENSE BREAST TISSUE: ICD-10-CM

## 2018-11-19 PROCEDURE — 77063 BREAST TOMOSYNTHESIS BI: CPT

## 2018-11-19 PROCEDURE — 77067 SCR MAMMO BI INCL CAD: CPT

## 2019-02-05 ENCOUNTER — TELEPHONE (OUTPATIENT)
Dept: FAMILY MEDICINE CLINIC | Facility: CLINIC | Age: 49
End: 2019-02-05

## 2019-02-05 NOTE — TELEPHONE ENCOUNTER
Patient called and left a message requesting an appointment to be seen for chest pain  Call was triaged  Patient has been experiencing chest pain and left arm pain for about 4 days  Patient says she sometimes feel short of breath  Patient was advised to go to the ER

## 2019-06-27 NOTE — PROGRESS NOTES
Assessment/Plan:    Hypertension, essential, benign  Patient's blood pressure is elevated  Will restart lisinopril 20 mg daily  Will order labwork and plan to review with patient at a follow-up visit in four weeks at which time we will reassess her blood pressure on medication  Dyslipidemia  Will obtain fasting labwork to assess current status  Will not restart statin medication at this time  Hypokalemia  Patient noted to have mild hypokalemia on multiple prior lab reports  Recommend patient eat a potassium rich diet, and a handout was provided  Will assess with labwork and follow-up to review the results in one month  Diagnoses and all orders for this visit:    Dyslipidemia  -     Lipid Panel with Direct LDL reflex; Future    Hypertension, essential, benign  -     Comprehensive metabolic panel; Future  -     CBC and differential; Future  -     lisinopril (ZESTRIL) 20 mg tablet; Take 1 tablet (20 mg total) by mouth daily    Screening for colon cancer  -     Ambulatory referral to Gastroenterology; Future    Screening for diabetes mellitus  -     Hemoglobin A1C; Future    Weight gain  -     TSH, 3rd generation with Free T4 reflex; Future    Myalgia  -     Vitamin D 25 hydroxy; Future    Hypokalemia          Subjective: Patient is here for yearly follow up   No labs   PHQ done  Health Maintenance Due   Topic Date Due    CRC Screening: Colonoscopy  1970    BMI: Followup Plan  03/26/1987    INFLUENZA VACCINE  07/01/2019        Patient ID: Mo Gaviria is a 47 y o  female  Patient presents to clinic today for a follow-up visit  Patient has a history of hypertension and hyperlipidemia, but has been off medication for many months  She is due for labwork, as her last labwork was done in 2017  She notes to have some muscle pain in the left leg, but otherwise no complaints today  Patient notes that she has had some weight gain over the past year        The following portions of the patient's history were reviewed and updated as appropriate: allergies, current medications, past family history, past medical history, past social history, past surgical history and problem list     Review of Systems   Constitutional: Positive for unexpected weight change (noted weight gain)  Respiratory: Negative for shortness of breath  Cardiovascular: Negative for chest pain  Musculoskeletal: Positive for myalgias (most in left leg)  Neurological: Negative for headaches  Objective:      /82 (BP Location: Left arm, Patient Position: Sitting, Cuff Size: Standard)   Pulse 75   Temp 98 5 °F (36 9 °C) (Oral)   Resp 17   Ht 4' 9" (1 448 m)   Wt 78 5 kg (173 lb)   SpO2 97%   BMI 37 44 kg/m²          Physical Exam   Constitutional: She is oriented to person, place, and time  She appears well-developed and well-nourished  She is cooperative  HENT:   Head: Normocephalic and atraumatic  Right Ear: Hearing, tympanic membrane, external ear and ear canal normal    Left Ear: Hearing, tympanic membrane, external ear and ear canal normal    Mouth/Throat: Uvula is midline, oropharynx is clear and moist and mucous membranes are normal    Eyes: Conjunctivae and lids are normal    Neck: Trachea normal, normal range of motion and phonation normal  Neck supple  No thyromegaly present  Cardiovascular: Normal rate, regular rhythm and normal heart sounds  No murmur heard  Pulmonary/Chest: Effort normal and breath sounds normal  She has no wheezes  She has no rhonchi  She has no rales  Musculoskeletal: Normal range of motion  Lymphadenopathy:     She has no cervical adenopathy  Neurological: She is alert and oriented to person, place, and time  She exhibits normal muscle tone  Gait normal    Skin: Skin is warm, dry and intact  Psychiatric: She has a normal mood and affect  Her speech is normal and behavior is normal    Nursing note and vitals reviewed  BMI Counseling:  Body mass index is 37 44 kg/m²  Discussed the patient's BMI with her  The BMI is above average  BMI counseling and education was provided to the patient  Nutrition recommendations include 3-5 servings of fruits/vegetables daily and decreasing soda and/or juice intake  Exercise recommendations include exercising 3-5 times per week

## 2019-07-02 ENCOUNTER — OFFICE VISIT (OUTPATIENT)
Dept: FAMILY MEDICINE CLINIC | Facility: CLINIC | Age: 49
End: 2019-07-02
Payer: COMMERCIAL

## 2019-07-02 VITALS
OXYGEN SATURATION: 97 % | SYSTOLIC BLOOD PRESSURE: 140 MMHG | HEIGHT: 57 IN | HEART RATE: 75 BPM | RESPIRATION RATE: 17 BRPM | DIASTOLIC BLOOD PRESSURE: 82 MMHG | TEMPERATURE: 98.5 F | BODY MASS INDEX: 37.32 KG/M2 | WEIGHT: 173 LBS

## 2019-07-02 DIAGNOSIS — Z12.11 SCREENING FOR COLON CANCER: ICD-10-CM

## 2019-07-02 DIAGNOSIS — R63.5 WEIGHT GAIN: ICD-10-CM

## 2019-07-02 DIAGNOSIS — M79.10 MYALGIA: ICD-10-CM

## 2019-07-02 DIAGNOSIS — E78.5 DYSLIPIDEMIA: Primary | ICD-10-CM

## 2019-07-02 DIAGNOSIS — E87.6 HYPOKALEMIA: ICD-10-CM

## 2019-07-02 DIAGNOSIS — I10 HYPERTENSION, ESSENTIAL, BENIGN: ICD-10-CM

## 2019-07-02 DIAGNOSIS — Z13.1 SCREENING FOR DIABETES MELLITUS: ICD-10-CM

## 2019-07-02 PROBLEM — E66.9 OBESITY (BMI 30-39.9): Status: RESOLVED | Noted: 2018-04-10 | Resolved: 2019-07-02

## 2019-07-02 PROCEDURE — 3008F BODY MASS INDEX DOCD: CPT | Performed by: FAMILY MEDICINE

## 2019-07-02 PROCEDURE — 99214 OFFICE O/P EST MOD 30 MIN: CPT | Performed by: FAMILY MEDICINE

## 2019-07-02 RX ORDER — LISINOPRIL 20 MG/1
20 TABLET ORAL DAILY
Qty: 30 TABLET | Refills: 1 | Status: SHIPPED | OUTPATIENT
Start: 2019-07-02 | End: 2019-08-06 | Stop reason: SDUPTHER

## 2019-07-02 NOTE — PATIENT INSTRUCTIONS
Lista de alimentos con contenido de potasio   LO QUE NECESITA SABER:   El potasio es un mineral que se encuentra en la mayoría de los alimentos  El potasio ayuda a balancear los líquidos y minerales en yadav cuerpo  También ayuda al cuerpo a mantener la presión sanguínea a un nivel normal  El potasio facilita las contracciones musculares y la función [place] de los nervios  Es posible que deba aumentar o disminuir el consumo de potasio si sufre ciertas afecciones médicas  INSTRUCCIONES SOBRE EL YURIY HOSPITALARIA:   Por qué es posible que deba cambiar la cantidad de potasio que consume:   · Es posible que deba consumir más potasio  si usted tiene hipocalemia (niveles bajos de potasio) o presión arterial yuriy  También podría necesitar más potasio en yadav dieta si usted jeison diuréticos  Los diuréticos y ciertos medicamentos causan que yadav cuerpo pierda potasio  · Es posible que deba consumir menos potasio  en yadav dieta si usted tiene hipercalemia (niveles altos de potasio) o yoselyn enfermedad renal   Contenido de potasio en las frutas:  La cantidad de potasio en miligramos (mg) en cada fruta o porción de fruta, aparece en la lista junto a cada elemento    · Alimentos con alto contenido de potasio (más de 200 mg por porción):      ¨ 1 plátano mediano (425)    ¨ ½ papaya (390)    ¨ ½ taza de jugo de ciruela pasa (370)    ¨ ¼ de yoselyn taza de pasas (270)    ¨ 1 cole mediano (325) o kiwi (240)    ¨ 1 naranja pequeña (240) o ½ taza de jugo de naranja (235)    ¨ ½ taza de melón julio cesar (215) o melón mariza julio cesar (200)    ¨ 1 dayna mediana (200)    · Alimentos con un contenido medio de potasio (50 a 200 mg por porción):      ¨ 1 durazno mediano (185)    ¨ 1 manzana pequeña o ½ taza de jugo de [place] (150)    ¨ ½ taza de duraznos enlatados en jugo (120)    ¨ ½ taza de cho enlatada (100)    ¨ ½ taza de fresas frescas y rebanadas (125)    ¨ ½ taza de sandía (85)    · Alimentos con bajo contenido de potasio (menos de 50 mg por porción):      ¨ ½ taza de arándanos (39) o cóctel de jugo de arándano (20)    ¨ ½ taza de néctar de papaya, cole o dayna (28)  Contenido de [employer] vegetales:   · Alimentos con alto contenido de potasio (más de 200 mg por porción):      ¨ 1 papa mediana horneada con cascara (925)    ¨ 1 camote mediano horneado con cascara (450)    ¨ ½ taza de tomate o jugo de vegetales (275), o 1 tomate crudo de tamaño mediano (290)    ¨ ½ taza de champiñones (280)    ¨ ½ taza de coles de Bruselas frescas (250)    ¨ ½ taza de calabacín cocida (220) o calabaza de invierno (250)    ¨ ¼ de un aguacate mediano (245)    ¨ ½ taza de brócoli (230)    · Alimentos con un contenido medio de potasio (50 a 200 mg por porción):      ¨ ½ taza de maíz (195)    ¨ ½ taza de zanahorias frescas o cocidas (180)    ¨ ½ taza de coliflor fresco (150)    ¨ ½ taza de espárragos (155)    ¨ ½ taza de guisantes enlatados (90)     ¨ 1 taza de anurag, de todo tipo (100)    ¨ ½ taza de habas verdes frescas (90)    ¨ ½ taza de habas verdes congeladas (85)    ¨ ½ taza de pepino (80)  Contenido de potasio en los alimentos ricos en proteína:   · Alimentos con alto contenido de potasio (más de 200 mg por porción):      ¨ ½ taza de frijoles pintos cocidos (400) o lentejas (365)    ¨ 1 taza de leche de soya (300)    ¨ 3 onzas de salmón horneado o asado (319)    ¨ 3 onzas de pavo asado, carne oscura (250)    ¨ ¼ de yoselyn taza de semillas de girasol (241)    ¨ 3 onzas de carne magra de res cocida (224)    ¨ 2 cucharadas de mantequilla de maní suave (210)    · Alimentos con un contenido medio de potasio (50 a 200 mg por porción):      ¨ 1 onza de maní, almendras o anacardos salados (200)    ¨ 1 huevo mak (60 mg)  Contenido de [employer] productos lácteos:   · Alimentos con alto contenido de potasio (más de 200 mg por porción):      ¨ 6 onzas de yogur (260 a 435)    ¨ 1 taza de leche entera o descremada y baja en grasas (350 a 380)    · Alimentos con un contenido medio de potasio (50 a 200 mg por porción):      ¨ ½ taza de queso ricotta (154)    ¨ ½ taza de helado de vainilla (131)    ¨ ½ taza de requesón (2%) bajo en grasa (110)    · Alimentos con bajo contenido de potasio (menos de 50 mg por porción):      ¨ 1 onza de queso (20 a 27)    Contenido de potasio de los granos:   · 1 rebanada de pan quevedo (30)    · ½ taza de arroz quevedo o integral (50)    · ½ taza de espagueti o macarrones (30)    · 1 tortilla de harina o maíz (50)    · 1 wafle de cuatro pulgadas (50)  Contenido de potasio en otros alimentos:   · 1 cucharada de melazas (295)    · 1½ onzas de chocolate (165)    · Algunos sustitutos de la sal pueden contener yoselyn sheri cantidad de potasio  Revise la etiqueta de los alimentos para encontrar la cantidad de potasio que contienen  © 2017 [address] Information is for End User's use only and may not be sold, redistributed or otherwise used for commercial purposes  All illustrations and images included in CareNotes® are the copyrighted property of A D A M [employer] Inc  or Joshua Ryder  Esta información es sólo para uso en educación  Ledezma intención no es darle un consejo médico sobre enfermedades o tratamientos  Colsulte con ledezma David Arms farmacéutico antes de seguir cualquier régimen médico para saber si es seguro y efectivo para usted

## 2019-07-03 PROBLEM — E87.6 HYPOKALEMIA: Status: ACTIVE | Noted: 2019-07-03

## 2019-07-03 NOTE — ASSESSMENT & PLAN NOTE
Patient's blood pressure is elevated  Will restart lisinopril 20 mg daily  Will order labwork and plan to review with patient at a follow-up visit in four weeks at which time we will reassess her blood pressure on medication

## 2019-07-03 NOTE — ASSESSMENT & PLAN NOTE
Will obtain fasting labwork to assess current status  Will not restart statin medication at this time

## 2019-07-03 NOTE — ASSESSMENT & PLAN NOTE
Patient noted to have mild hypokalemia on multiple prior lab reports  Recommend patient eat a potassium rich diet, and a handout was provided  Will assess with labwork and follow-up to review the results in one month

## 2019-07-09 ENCOUNTER — APPOINTMENT (OUTPATIENT)
Dept: LAB | Facility: HOSPITAL | Age: 49
End: 2019-07-09
Payer: COMMERCIAL

## 2019-07-09 ENCOUNTER — TRANSCRIBE ORDERS (OUTPATIENT)
Dept: LAB | Facility: HOSPITAL | Age: 49
End: 2019-07-09

## 2019-07-09 DIAGNOSIS — M79.10 MYALGIA: ICD-10-CM

## 2019-07-09 DIAGNOSIS — R63.5 WEIGHT GAIN: ICD-10-CM

## 2019-07-09 DIAGNOSIS — I10 HYPERTENSION, ESSENTIAL, BENIGN: ICD-10-CM

## 2019-07-09 DIAGNOSIS — E78.5 DYSLIPIDEMIA: ICD-10-CM

## 2019-07-09 LAB
25(OH)D3 SERPL-MCNC: 17.6 NG/ML (ref 30–100)
ALBUMIN SERPL BCP-MCNC: 3.6 G/DL (ref 3.5–5)
ALP SERPL-CCNC: 68 U/L (ref 46–116)
ALT SERPL W P-5'-P-CCNC: 30 U/L (ref 12–78)
ANION GAP SERPL CALCULATED.3IONS-SCNC: 7 MMOL/L (ref 4–13)
AST SERPL W P-5'-P-CCNC: 20 U/L (ref 5–45)
BASOPHILS # BLD AUTO: 0.04 THOUSANDS/ΜL (ref 0–0.1)
BASOPHILS NFR BLD AUTO: 1 % (ref 0–1)
BILIRUB SERPL-MCNC: 0.4 MG/DL (ref 0.2–1)
BUN SERPL-MCNC: 14 MG/DL (ref 5–25)
CALCIUM SERPL-MCNC: 8.8 MG/DL (ref 8.3–10.1)
CHLORIDE SERPL-SCNC: 106 MMOL/L (ref 100–108)
CHOLEST SERPL-MCNC: 255 MG/DL (ref 50–200)
CO2 SERPL-SCNC: 24 MMOL/L (ref 21–32)
CREAT SERPL-MCNC: 0.5 MG/DL (ref 0.6–1.3)
EOSINOPHIL # BLD AUTO: 0.15 THOUSAND/ΜL (ref 0–0.61)
EOSINOPHIL NFR BLD AUTO: 2 % (ref 0–6)
ERYTHROCYTE [DISTWIDTH] IN BLOOD BY AUTOMATED COUNT: 12.7 % (ref 11.6–15.1)
GFR SERPL CREATININE-BSD FRML MDRD: 113 ML/MIN/1.73SQ M
GLUCOSE P FAST SERPL-MCNC: 111 MG/DL (ref 65–99)
HCT VFR BLD AUTO: 39.8 % (ref 34.8–46.1)
HDLC SERPL-MCNC: 35 MG/DL (ref 40–60)
HGB BLD-MCNC: 13.5 G/DL (ref 11.5–15.4)
IMM GRANULOCYTES # BLD AUTO: 0.02 THOUSAND/UL (ref 0–0.2)
IMM GRANULOCYTES NFR BLD AUTO: 0 % (ref 0–2)
LDLC SERPL CALC-MCNC: 168 MG/DL (ref 0–100)
LYMPHOCYTES # BLD AUTO: 1.68 THOUSANDS/ΜL (ref 0.6–4.47)
LYMPHOCYTES NFR BLD AUTO: 27 % (ref 14–44)
MCH RBC QN AUTO: 27.6 PG (ref 26.8–34.3)
MCHC RBC AUTO-ENTMCNC: 33.9 G/DL (ref 31.4–37.4)
MCV RBC AUTO: 81 FL (ref 82–98)
MONOCYTES # BLD AUTO: 0.46 THOUSAND/ΜL (ref 0.17–1.22)
MONOCYTES NFR BLD AUTO: 7 % (ref 4–12)
NEUTROPHILS # BLD AUTO: 3.9 THOUSANDS/ΜL (ref 1.85–7.62)
NEUTS SEG NFR BLD AUTO: 63 % (ref 43–75)
NRBC BLD AUTO-RTO: 0 /100 WBCS
PLATELET # BLD AUTO: 242 THOUSANDS/UL (ref 149–390)
PMV BLD AUTO: 11.5 FL (ref 8.9–12.7)
POTASSIUM SERPL-SCNC: 3.3 MMOL/L (ref 3.5–5.3)
PROT SERPL-MCNC: 7.1 G/DL (ref 6.4–8.2)
RBC # BLD AUTO: 4.89 MILLION/UL (ref 3.81–5.12)
SODIUM SERPL-SCNC: 137 MMOL/L (ref 136–145)
T4 FREE SERPL-MCNC: 0.97 NG/DL (ref 0.76–1.46)
TRIGL SERPL-MCNC: 259 MG/DL
TSH SERPL DL<=0.05 MIU/L-ACNC: 3.77 UIU/ML (ref 0.36–3.74)
WBC # BLD AUTO: 6.25 THOUSAND/UL (ref 4.31–10.16)

## 2019-07-09 PROCEDURE — 80053 COMPREHEN METABOLIC PANEL: CPT

## 2019-07-09 PROCEDURE — 36415 COLL VENOUS BLD VENIPUNCTURE: CPT

## 2019-07-09 PROCEDURE — 80061 LIPID PANEL: CPT

## 2019-07-09 PROCEDURE — 84443 ASSAY THYROID STIM HORMONE: CPT

## 2019-07-09 PROCEDURE — 82306 VITAMIN D 25 HYDROXY: CPT

## 2019-07-09 PROCEDURE — 84439 ASSAY OF FREE THYROXINE: CPT

## 2019-07-09 PROCEDURE — 85025 COMPLETE CBC W/AUTO DIFF WBC: CPT

## 2019-08-01 NOTE — PROGRESS NOTES
Assessment/Plan:    Hypothyroidism  Patient has an elevated TSH and is symptomatic  Will begin treatment with levothyroxine 25 mcg daily  Plan to reassess TSH in 6 weeks  Hypertension, essential, benign  Hypertension improved on lisinopril 20 mg daily  Continue current medication  Dyslipidemia  Patient has elevated triglycerides and LDL with a low HDL  Will restart pravastatin 40 mg daily  Will start Lovaza 2 gm BID  Discussed diet changes and working on increasing exercise  Will plan to reassess labwork in 6 months; will order labwork at the next office visit  Hypokalemia  Patient continues to have hypokalemia  Will treat with a potassium supplement and reassess with labwork in 6 weeks  Vitamin D deficiency  Vitamin D level is deficient at 16  Will begin treatment with ergocalciferol 50,000 IU weekly  Will plan to reassess vitamin D level in 3 months; will need to order this lab at the next office visit  Diagnoses and all orders for this visit:    Dyslipidemia  -     pravastatin (PRAVACHOL) 40 mg tablet; Take 1 tablet (40 mg total) by mouth daily  -     omega-3-acid ethyl esters (LOVAZA) 1 g capsule; Take 2 capsules (2 g total) by mouth 2 (two) times a day    Hypertension, essential, benign  -     lisinopril (ZESTRIL) 20 mg tablet; Take 1 tablet (20 mg total) by mouth daily    Screening for malignant neoplasm of colon  -     Occult Blood, Fecal Immunochemical; Future    Hypokalemia  -     potassium chloride (MICRO-K) 10 MEQ CR capsule; Take 1 capsule (10 mEq total) by mouth daily  -     Basic metabolic panel; Future    Hypothyroidism, unspecified type  -     levothyroxine 25 mcg tablet; Take 1 tablet (25 mcg total) by mouth daily  -     TSH, 3rd generation with Free T4 reflex; Future    Vitamin D deficiency  -     ergocalciferol (VITAMIN D2) 50,000 units;  Take 1 capsule (50,000 Units total) by mouth once a week    Other orders  -     diclofenac sodium (VOLTAREN) 1 %; PLEASE SEE ATTACHED FOR DETAILED DIRECTIONS          Subjective: Patient is here for a 1 month follow up - recheck BP    Patient says she called to schedule her coloscopy and was told someone would call her back to schedule but she didn't get a return call     Health Maintenance Due   Topic Date Due    CRC Screening: FOBTx3/FIT  03/26/2019    INFLUENZA VACCINE  07/01/2019          Patient ID: Miguel Angel Bahena is a 47 y o  female  Hypertension   This is a chronic problem  The current episode started more than 1 year ago  The problem has been gradually improving since onset  The problem is controlled  Pertinent negatives include no chest pain, headaches or shortness of breath  There are no associated agents to hypertension  Risk factors for coronary artery disease include sedentary lifestyle, obesity, dyslipidemia and post-menopausal state  Past treatments include ACE inhibitors  The current treatment provides significant improvement  Compliance problems include exercise and diet  Identifiable causes of hypertension include a thyroid problem (noted on labwork today)  The following portions of the patient's history were reviewed and updated as appropriate: allergies, current medications, past family history, past medical history, past social history, past surgical history and problem list     Review of Systems   Constitutional: Positive for fatigue and unexpected weight change (weight gain)  Respiratory: Negative for shortness of breath  Cardiovascular: Negative for chest pain  Neurological: Negative for headaches  Objective:      /84 (BP Location: Left arm, Patient Position: Sitting, Cuff Size: Large)   Pulse 68   Temp 97 9 °F (36 6 °C) (Oral)   Resp 20   Ht 4' 9" (1 448 m)   Wt 79 3 kg (174 lb 12 8 oz)   SpO2 98%   BMI 37 83 kg/m²          Physical Exam   Constitutional: She is oriented to person, place, and time  She appears well-developed and well-nourished  She is cooperative     HENT:   Head: Normocephalic and atraumatic  Right Ear: Hearing and external ear normal    Left Ear: Hearing and external ear normal    Eyes: Conjunctivae and lids are normal    Cardiovascular: Normal rate  Pulmonary/Chest: Effort normal    Musculoskeletal: Normal range of motion  Neurological: She is alert and oriented to person, place, and time  She exhibits normal muscle tone  Gait normal    Skin: Skin is warm, dry and intact  Psychiatric: She has a normal mood and affect  Her speech is normal and behavior is normal    Nursing note and vitals reviewed  BMI Counseling: Body mass index is 37 83 kg/m²  Discussed the patient's BMI with her  The BMI is above average  BMI counseling and education was provided to the patient  Nutrition recommendations include 3-5 servings of fruits/vegetables daily and decreasing soda and/or juice intake  Exercise recommendations include exercising 3-5 times per week

## 2019-08-06 ENCOUNTER — OFFICE VISIT (OUTPATIENT)
Dept: FAMILY MEDICINE CLINIC | Facility: CLINIC | Age: 49
End: 2019-08-06
Payer: COMMERCIAL

## 2019-08-06 VITALS
HEART RATE: 68 BPM | HEIGHT: 57 IN | BODY MASS INDEX: 37.71 KG/M2 | TEMPERATURE: 97.9 F | DIASTOLIC BLOOD PRESSURE: 84 MMHG | RESPIRATION RATE: 20 BRPM | SYSTOLIC BLOOD PRESSURE: 132 MMHG | OXYGEN SATURATION: 98 % | WEIGHT: 174.8 LBS

## 2019-08-06 DIAGNOSIS — Z12.11 SCREENING FOR MALIGNANT NEOPLASM OF COLON: ICD-10-CM

## 2019-08-06 DIAGNOSIS — E55.9 VITAMIN D DEFICIENCY: ICD-10-CM

## 2019-08-06 DIAGNOSIS — I10 HYPERTENSION, ESSENTIAL, BENIGN: ICD-10-CM

## 2019-08-06 DIAGNOSIS — E03.9 HYPOTHYROIDISM, UNSPECIFIED TYPE: ICD-10-CM

## 2019-08-06 DIAGNOSIS — E87.6 HYPOKALEMIA: ICD-10-CM

## 2019-08-06 DIAGNOSIS — E78.5 DYSLIPIDEMIA: Primary | ICD-10-CM

## 2019-08-06 PROCEDURE — 99214 OFFICE O/P EST MOD 30 MIN: CPT | Performed by: FAMILY MEDICINE

## 2019-08-06 PROCEDURE — 3075F SYST BP GE 130 - 139MM HG: CPT | Performed by: FAMILY MEDICINE

## 2019-08-06 PROCEDURE — 1036F TOBACCO NON-USER: CPT | Performed by: FAMILY MEDICINE

## 2019-08-06 PROCEDURE — 3008F BODY MASS INDEX DOCD: CPT | Performed by: FAMILY MEDICINE

## 2019-08-06 RX ORDER — PRAVASTATIN SODIUM 40 MG
40 TABLET ORAL DAILY
Qty: 90 TABLET | Refills: 1 | Status: SHIPPED | OUTPATIENT
Start: 2019-08-06 | End: 2021-05-18 | Stop reason: SDUPTHER

## 2019-08-06 RX ORDER — OMEGA-3-ACID ETHYL ESTERS 1 G/1
2 CAPSULE, LIQUID FILLED ORAL 2 TIMES DAILY
Qty: 360 CAPSULE | Refills: 1 | Status: SHIPPED | OUTPATIENT
Start: 2019-08-06 | End: 2021-05-18 | Stop reason: SDUPTHER

## 2019-08-06 RX ORDER — POTASSIUM CHLORIDE 750 MG/1
10 CAPSULE, EXTENDED RELEASE ORAL DAILY
Qty: 30 CAPSULE | Refills: 1 | Status: SHIPPED | OUTPATIENT
Start: 2019-08-06 | End: 2019-09-18 | Stop reason: ALTCHOICE

## 2019-08-06 RX ORDER — LISINOPRIL 20 MG/1
20 TABLET ORAL DAILY
Qty: 90 TABLET | Refills: 1 | Status: SHIPPED | OUTPATIENT
Start: 2019-08-06 | End: 2021-04-28 | Stop reason: SDUPTHER

## 2019-08-06 RX ORDER — LEVOTHYROXINE SODIUM 0.03 MG/1
25 TABLET ORAL DAILY
Qty: 30 TABLET | Refills: 1 | Status: SHIPPED | OUTPATIENT
Start: 2019-08-06 | End: 2019-09-18 | Stop reason: SDUPTHER

## 2019-08-06 RX ORDER — ERGOCALCIFEROL 1.25 MG/1
50000 CAPSULE ORAL WEEKLY
Qty: 12 CAPSULE | Refills: 0 | Status: SHIPPED | OUTPATIENT
Start: 2019-08-06

## 2019-08-06 NOTE — ASSESSMENT & PLAN NOTE
Patient has elevated triglycerides and LDL with a low HDL  Will restart pravastatin 40 mg daily  Will start Lovaza 2 gm BID  Discussed diet changes and working on increasing exercise  Will plan to reassess labwork in 6 months; will order labwork at the next office visit

## 2019-08-06 NOTE — PATIENT INSTRUCTIONS
Dieta con alto contenido de [place]   CUIDADO AMBULATORIO:   [place] dieta con alto contenido de fibra  incluye alimentos con yoselyn sheri cantidad de fibra  La fibra es la parte de las frutas, los vegetales y los granos, que no se descompone al ser ingerida por yadav cuerpo  La fibra ayuda a regular las evacuaciones intestinales  La fibra además ayuda a bajar el colesterol, controlar la glucosa en la eli en las personas que sufren de diabetes y para aliviar el estreñimiento  También la fibra podría ayudarle a controlar yadav peso debido a que le da la sensación de llenura más rápidamente  La mayoría de los adultos deberían consumir entre 25 a 35 gramos de fibra al día  Consulte con yadav dietista o médico sobre la cantidad de fibra que usted necesita  [place] de fibra:   · Alimentos que contienen al menos 4 gramos de fibra por porción:      ¨ ? a ½ de yoselyn taza de cereal con alto contenido de fibra (heidi la etiqueta nutricional en la caja)    ¨ ½ taza de moras o frambuesas    ¨ 4 ciruelas pasas    ¨ 1 alcachofa cocida    ¨ ½ taza de legumbres cocidas, vanessa lentejas o frijoles rojos o pintos    · [employer] contienen 1 a 3 gramos de [place] por porción:      ¨ 1 rebanada de pan de giselle integral, pan integral de huynh o pan de huynh    ¨ ½ taza de arroz integral cocido    ¨ 4 galletas integrales    ¨ 1 taza de flori    ¨ ½ taza de cereal con 1 a 3 gramos de [place] por porción (heidi la etiqueta nutricional en la caja)    ¨ 1 porción pequeña de fruta vanessa manzana, banana, dayna, kiwi o naranja    ¨ 3 dátiles    ¨ ½ taza de albaricoques enlatados, ensalada de frutas, duraznos o peras    ¨ ½ taza de verduras crudas o cocidas, vanessa zanahorias, coliflor, repollo, espinaca, calabaza o maíz  Formas en que puede aumentar la fibra en yadav dieta:   · Escoja arroz integral o deidre en vez de arroz quevedo      · Use harina de grano integral en las recetas en vez de harina jarod  · Aldo Dunnellon y arvejas a los guisos o las sopas  · Demaris Bachelor y verduras frescas con la cascara en vez de jugos  Otras pautas dietéticas que debe seguir:   · Harlon Citrin a ledezma dieta lentamente  Usted podría presentar malestar o inflamación estomacal y gases si añade fibra a ledezma dieta demasiado rápido  · Krista mucho líquido a medida que agrega fibra a ledezma dieta  Es posible que tenga náuseas o desarrolle estreñimiento si no jeison suficiente agua  Pregunte cuánto líquido debe vick cada día y cuáles líquidos son los más adecuados para usted  © 2017 [address] [employer] Information is for End User's use only and may not be sold, redistributed or otherwise used for commercial purposes  All illustrations and images included in CareNotes® are the copyrighted property of A D A M , Inc  or Joshua Ryder  Esta información es sólo para uso en educación  Ledezma intención no es darle un consejo médico sobre enfermedades o tratamientos  Colsulte con ledezma Melven Rimes farmacéutico antes de seguir cualquier régimen médico para saber si es seguro y efectivo para usted  Hipotiroidismo   CUIDADO AMBULATORIO:   Hipotiroidismo  es yoselyn afección que se desarrolla cuando la glándula tiroides no produce suficiente hormona tiroidea  Las hormonas de la tiroides ayudan a controlar la temperatura del cuerpo, el ritmo cardíaco, el crecimiento, y peso corporal   Los signos y síntomas más comunes incluyen los siguientes:  Lo signos y síntomas pueden desarrollarse lentamente, a veces en el transcurso de varios años  · Agotamiento    · Sensibilidad al frío    · Hillary de omi o disminución en la capacidad de concentración    · Hillary o debilidad muscular    · Estreñimiento     · Piel seca y escamosa o uñas quebradizas    · Adelgazamiento del alexia    · Periodos menstruales francine o irregulares    · Depresión o irritabilidad  Llame al 911 en puneet de presentar lo siguiente:   · Usted tiene dolor de pecho repentino o falta de aire      · Usted sufre yoselyn convulsión  · Usted siente que se va a desmayar  Busque atención médica de inmediato si:   · Usted tiene diarrea, temblores o dificultad para dormir  · Bridgett piernas, tobillos o pies están inflamados  Pregúntele a ledezma Claryce Levans vitaminas y minerales son adecuados para usted  · Usted tiene fiebre  · Usted tiene escalofríos, tos o se siente débil y adolorido  · Usted tiene dolor e inflamación en los músculos y articulaciones  · Usted tiene comezón en la piel, inflamación o un sarpullido  · Bridgett signos y síntomas regresan o [place], aún después del [place]  · Usted tiene preguntas o inquietudes acerca de ledezma condición o cuidado  Tratamiento:  El medicamento para reemplazar la hormona tiroidea puede devolver ledezma nivel de hormona tiroidea a la normalidad  [place] más información a ledezma médico sobre otros medicamentos que usted podría necesitar  Acuda a bridgett consultas de control con ledezma médico según le indicaron  Es probable que tenga que regresar para realizarse más exámenes de eli y controlar bridgett niveles de la hormona tiroides  Los exámenes mostrarán si usted está recibiendo la cantidad correcta de medicamentos para la tiroides  Anote bridgett preguntas para que se acuerde de hacerlas veronique bridgett visitas  © 2017 [address] [employer] Information is for End User's use only and may not be sold, redistributed or otherwise used for commercial purposes  All illustrations and images included in CareNotes® are the copyrighted property of A D A M , Inc  or Joshua Ryder  Esta información es sólo para uso en educación  Ledezma intención no es darle un consejo médico sobre enfermedades o tratamientos  Colsulte con ledezma Joellen Kind farmacéutico antes de seguir cualquier régimen médico para saber si es seguro y efectivo para usted

## 2019-08-06 NOTE — ASSESSMENT & PLAN NOTE
Patient has an elevated TSH and is symptomatic  Will begin treatment with levothyroxine 25 mcg daily  Plan to reassess TSH in 6 weeks

## 2019-08-06 NOTE — ASSESSMENT & PLAN NOTE
Vitamin D level is deficient at 17  Will begin treatment with ergocalciferol 50,000 IU weekly  Will plan to reassess vitamin D level in 3 months; will need to order this lab at the next office visit

## 2019-08-06 NOTE — ASSESSMENT & PLAN NOTE
Patient continues to have hypokalemia  Will treat with a potassium supplement and reassess with labwork in 6 weeks

## 2019-08-08 ENCOUNTER — TELEPHONE (OUTPATIENT)
Dept: FAMILY MEDICINE CLINIC | Facility: CLINIC | Age: 49
End: 2019-08-08

## 2019-08-28 ENCOUNTER — TELEPHONE (OUTPATIENT)
Dept: FAMILY MEDICINE CLINIC | Facility: CLINIC | Age: 49
End: 2019-08-28

## 2019-08-28 NOTE — TELEPHONE ENCOUNTER
It looks like it was reordered by the lab  Please review collection instructions with patient when you speak with her

## 2019-08-28 NOTE — TELEPHONE ENCOUNTER
Patient's fecal occult rest was cancelled by the laboratory   because it was improperly collected  Called and left a message for patient to call back   Patient will need to complete the test again

## 2019-09-18 ENCOUNTER — OFFICE VISIT (OUTPATIENT)
Dept: FAMILY MEDICINE CLINIC | Facility: CLINIC | Age: 49
End: 2019-09-18
Payer: COMMERCIAL

## 2019-09-18 ENCOUNTER — APPOINTMENT (OUTPATIENT)
Dept: LAB | Facility: HOSPITAL | Age: 49
End: 2019-09-18
Payer: COMMERCIAL

## 2019-09-18 VITALS
HEIGHT: 57 IN | BODY MASS INDEX: 36.89 KG/M2 | SYSTOLIC BLOOD PRESSURE: 130 MMHG | RESPIRATION RATE: 17 BRPM | WEIGHT: 171 LBS | HEART RATE: 73 BPM | DIASTOLIC BLOOD PRESSURE: 80 MMHG | TEMPERATURE: 97.7 F | OXYGEN SATURATION: 98 %

## 2019-09-18 DIAGNOSIS — E78.5 DYSLIPIDEMIA: ICD-10-CM

## 2019-09-18 DIAGNOSIS — E87.6 HYPOKALEMIA: ICD-10-CM

## 2019-09-18 DIAGNOSIS — Z12.11 SCREENING FOR MALIGNANT NEOPLASM OF COLON: ICD-10-CM

## 2019-09-18 DIAGNOSIS — E03.9 HYPOTHYROIDISM, UNSPECIFIED TYPE: Primary | ICD-10-CM

## 2019-09-18 DIAGNOSIS — E03.9 HYPOTHYROIDISM, UNSPECIFIED TYPE: ICD-10-CM

## 2019-09-18 DIAGNOSIS — E55.9 VITAMIN D DEFICIENCY: ICD-10-CM

## 2019-09-18 LAB
ANION GAP SERPL CALCULATED.3IONS-SCNC: 4 MMOL/L (ref 4–13)
BUN SERPL-MCNC: 16 MG/DL (ref 5–25)
CALCIUM SERPL-MCNC: 8.7 MG/DL (ref 8.3–10.1)
CHLORIDE SERPL-SCNC: 106 MMOL/L (ref 100–108)
CO2 SERPL-SCNC: 27 MMOL/L (ref 21–32)
CREAT SERPL-MCNC: 0.6 MG/DL (ref 0.6–1.3)
GFR SERPL CREATININE-BSD FRML MDRD: 107 ML/MIN/1.73SQ M
GLUCOSE P FAST SERPL-MCNC: 94 MG/DL (ref 65–99)
HEMOCCULT STL QL IA: NEGATIVE
POTASSIUM SERPL-SCNC: 3.8 MMOL/L (ref 3.5–5.3)
SODIUM SERPL-SCNC: 137 MMOL/L (ref 136–145)
TSH SERPL DL<=0.05 MIU/L-ACNC: 3.49 UIU/ML (ref 0.36–3.74)

## 2019-09-18 PROCEDURE — 84443 ASSAY THYROID STIM HORMONE: CPT

## 2019-09-18 PROCEDURE — 99214 OFFICE O/P EST MOD 30 MIN: CPT | Performed by: FAMILY MEDICINE

## 2019-09-18 PROCEDURE — 80048 BASIC METABOLIC PNL TOTAL CA: CPT

## 2019-09-18 PROCEDURE — 3008F BODY MASS INDEX DOCD: CPT | Performed by: FAMILY MEDICINE

## 2019-09-18 PROCEDURE — 36415 COLL VENOUS BLD VENIPUNCTURE: CPT

## 2019-09-18 PROCEDURE — G0328 FECAL BLOOD SCRN IMMUNOASSAY: HCPCS

## 2019-09-18 RX ORDER — LEVOTHYROXINE SODIUM 0.03 MG/1
25 TABLET ORAL DAILY
Qty: 90 TABLET | Refills: 1 | Status: SHIPPED | OUTPATIENT
Start: 2019-09-18 | End: 2022-02-04 | Stop reason: HOSPADM

## 2019-09-18 NOTE — PROGRESS NOTES
Assessment/Plan:    Hypothyroidism  TSH normal   Continue levothyroxine 25 mcg daily  Will reassess labs in 5 months  Dyslipidemia  Continue statin  Will recheck labwork in 5 months  Hypokalemia  Continue high potassium diet  Will reassess potassium level with next labs  Vitamin D deficiency  Finish ergocalciferol as prescribed  Once completed, take an OTC daily vitamin D supplement  Will reassess vitamin D level with next labs  Diagnoses and all orders for this visit:    Hypothyroidism, unspecified type  -     levothyroxine 25 mcg tablet; Take 1 tablet (25 mcg total) by mouth daily  -     TSH, 3rd generation with Free T4 reflex; Future    Vitamin D deficiency  -     Vitamin D 25 hydroxy; Future    Hypokalemia  -     Comprehensive metabolic panel; Future    Dyslipidemia  -     Lipid Panel with Direct LDL reflex; Future          Subjective:      Patient ID: Ana Collier is a 47 y o  female  Patient presents to clinic today for a follow-up visit  She was started on KCl for her hypokalemia, and levothyroxine 25 mcg daily for her hypothyroidism  She has been taking the medications as prescribed  She completed her labwork and presents to clinic today to review those results  She has no complaints today  Thyroid Problem   Presents for follow-up visit  Symptoms include weight loss  Patient reports no depressed mood  The symptoms have been stable  The following portions of the patient's history were reviewed and updated as appropriate: allergies, current medications, past family history, past medical history, past social history, past surgical history and problem list     Review of Systems   Constitutional: Positive for weight loss  Respiratory: Negative for cough  Cardiovascular: Negative for chest pain  Psychiatric/Behavioral: Negative for dysphoric mood           Objective:      /80 (BP Location: Left arm, Patient Position: Sitting, Cuff Size: Standard)   Pulse 73   Temp 97 7 °F (36 5 °C) (Oral)   Resp 17   Ht 4' 9" (1 448 m)   Wt 77 6 kg (171 lb)   SpO2 98%   BMI 37 00 kg/m²          Physical Exam   Constitutional: She is oriented to person, place, and time  She appears well-developed and well-nourished  She is cooperative  HENT:   Head: Normocephalic and atraumatic  Right Ear: Hearing and external ear normal    Left Ear: Hearing and external ear normal    Eyes: Conjunctivae and lids are normal    Cardiovascular: Normal rate  Pulmonary/Chest: Effort normal    Musculoskeletal: Normal range of motion  Neurological: She is alert and oriented to person, place, and time  She exhibits normal muscle tone  Gait normal    Skin: Skin is warm, dry and intact  Psychiatric: She has a normal mood and affect  Her speech is normal and behavior is normal    Nursing note and vitals reviewed

## 2019-09-18 NOTE — PATIENT INSTRUCTIONS
Hipocalemia   CUIDADO AMBULATORIO:   Hipocalemia  es la presencia de niveles bajos de potasio en la [place]  El potasio ayuda a controlar el funcionamiento de los músculos, el corazón y del aparato digestivo  La hipocalemia ocurre cuando el cuerpo pierde demasiado potasio o no absorbe lo suficiente a partir de los alimentos  Los signos y síntomas más comunes incluyen los siguientes:  Es probable que usted no presente ningún signo o síntoma si tiene hipocalemia leve  Es posible que tenga alguno de los siguientes si es más grave:  · Cansancio    · Estreñimiento    · Orinar frecuentemente u orinar en grandes cantidades    · Calambres musculares u sensación de hormigueo en la piel    · Debilidad muscular    · Ritmo cardíaco acelerado o irregular  Busque atención médica de inmediato si:   · Usted no puede  el brazo o la pierna  · Yadav ritmo cardíaco es acelerado o irregular  · Se siente demasiado débil o mareado para ponerse de pie  Pregúntele a yadav [employer] vitaminas y minerales son adecuados para usted  · Tiene vómitos o diarrea  · Usted tiene entumecimiento u hormigueo en los brazos o las piernas  · Los síntomas no desaparecen o empeoran  · Usted tiene preguntas o inquietudes acerca de yadav condición o cuidado  Tratamiento:  A usted le administrarán potasio para regresar los niveles a la normalidad  [place] podría administrarse en forma de píldora o de yoselyn vía intravenosa (IV)  La cantidad de potasio que se le dará depende de yadav nivel de potasio  Consuma alimentos ricos en potasio:  Algunos alimentos que tienen alto contenido de potasio son los [place], las [place], los [place], las patatas y el aguacate  Los frijoles pintos, el [place], el salmón, la carne [place], [place] yogur y la [place] también son ricos en potasio  [place] más información a yadav médico o dietista sobre los alimentos que son ricos en potasio  Acuda a sharon consultas de control con yadav médico según le indicaron    Anote sharon preguntas para que se acuerde de hacerlas veronique sharon visitas  © 2017 [address] [employer] Information is for End User's use only and may not be sold, redistributed or otherwise used for commercial purposes  All illustrations and images included in CareNotes® are the copyrighted property of A NOE A M , Inc  or Joshua Ryder  Esta información es sólo para uso en educación  Ledezma intención no es darle un consejo médico sobre enfermedades o tratamientos  Colsulte con ledezma Benuel Russell farmacéutico antes de seguir cualquier régimen médico para saber si es seguro y efectivo para usted

## 2019-09-18 NOTE — ASSESSMENT & PLAN NOTE
Finish ergocalciferol as prescribed  Once completed, take an OTC daily vitamin D supplement  Will reassess vitamin D level with next labs

## 2019-10-01 ENCOUNTER — ANNUAL EXAM (OUTPATIENT)
Dept: OBGYN CLINIC | Facility: CLINIC | Age: 49
End: 2019-10-01
Payer: COMMERCIAL

## 2019-10-01 VITALS
HEIGHT: 60 IN | BODY MASS INDEX: 33.57 KG/M2 | WEIGHT: 171 LBS | DIASTOLIC BLOOD PRESSURE: 90 MMHG | SYSTOLIC BLOOD PRESSURE: 130 MMHG

## 2019-10-01 DIAGNOSIS — Z01.419 ENCOUNTER FOR GYNECOLOGICAL EXAMINATION: Primary | ICD-10-CM

## 2019-10-01 DIAGNOSIS — Z12.39 SCREENING FOR MALIGNANT NEOPLASM OF BREAST: ICD-10-CM

## 2019-10-01 DIAGNOSIS — Z11.51 SCREENING FOR HUMAN PAPILLOMAVIRUS (HPV): ICD-10-CM

## 2019-10-01 PROCEDURE — S0612 ANNUAL GYNECOLOGICAL EXAMINA: HCPCS | Performed by: OBSTETRICS & GYNECOLOGY

## 2019-10-01 PROCEDURE — G0145 SCR C/V CYTO,THINLAYER,RESCR: HCPCS | Performed by: OBSTETRICS & GYNECOLOGY

## 2019-10-01 PROCEDURE — 87624 HPV HI-RISK TYP POOLED RSLT: CPT | Performed by: OBSTETRICS & GYNECOLOGY

## 2019-10-02 LAB
HPV HR 12 DNA CVX QL NAA+PROBE: NEGATIVE
HPV16 DNA CVX QL NAA+PROBE: NEGATIVE
HPV18 DNA CVX QL NAA+PROBE: NEGATIVE

## 2019-10-02 NOTE — PROGRESS NOTES
Jane Wilkes  1970      CC:  Yearly exam    S:  47 y o  female here for yearly exam  Her cycles are not heavy or crampy  She had her last cycle 10 months ago  She had a cycle about 10 months before that  She denies heavy or prolonged bleeding  She will call with any further bleeding from this point forward  Sexual activity: She is sexually active without pain, bleeding or dryness  Contraception: She uses nothing for contraception  Last Pap 2/10/2015 - normal/negative HPV  Last Mammo 11/19/2018 - BIRAD-1  Last colonoscopy - did fecal occult blood this year which was negative per patient; prefers this to colonoscopy    We reviewed ASCCP guidelines for Pap testing today         Current Outpatient Medications:     aspirin (ECOTRIN LOW STRENGTH) 81 mg EC tablet, Take 81 mg by mouth daily, Disp: , Rfl:     diclofenac sodium (VOLTAREN) 1 %, PLEASE SEE ATTACHED FOR DETAILED DIRECTIONS, Disp: , Rfl: 6    ergocalciferol (VITAMIN D2) 50,000 units, Take 1 capsule (50,000 Units total) by mouth once a week, Disp: 12 capsule, Rfl: 0    levothyroxine 25 mcg tablet, Take 1 tablet (25 mcg total) by mouth daily, Disp: 90 tablet, Rfl: 1    lisinopril (ZESTRIL) 20 mg tablet, Take 1 tablet (20 mg total) by mouth daily, Disp: 90 tablet, Rfl: 1    omega-3-acid ethyl esters (LOVAZA) 1 g capsule, Take 2 capsules (2 g total) by mouth 2 (two) times a day, Disp: 360 capsule, Rfl: 1    pravastatin (PRAVACHOL) 40 mg tablet, Take 1 tablet (40 mg total) by mouth daily, Disp: 90 tablet, Rfl: 1  Social History     Socioeconomic History    Marital status: /Civil Union     Spouse name: Not on file    Number of children: Not on file    Years of education: Not on file    Highest education level: Not on file   Occupational History    Not on file   Social Needs    Financial resource strain: Not hard at all   i-marker insecurity:     Worry: Never true     Inability: Never true   [employer] needs:     Medical: No Non-medical: No   Tobacco Use    Smoking status: Never Smoker    Smokeless tobacco: Never Used   Substance and Sexual Activity    Alcohol use: No    Drug use: No    Sexual activity: Yes     Partners: Male     Birth control/protection: None     Comment:    Lifestyle    Physical activity:     Days per week: 0 days     Minutes per session: 0 min    Stress: To some extent   Relationships    Social connections:     Talks on phone: Patient refused     Gets together: Patient refused     Attends Christian service: Patient refused     Active member of club or organization: Patient refused     Attends meetings of clubs or organizations: Patient refused     Relationship status: Patient refused    Intimate partner violence:     Fear of current or ex partner: Patient refused     Emotionally abused: Patient refused     Physically abused: Patient refused     Forced sexual activity: Patient refused   Other Topics Concern    Not on file   Social History Narrative    Daily coffee consumption, (1 cup/day)     Sedentary lifestyle      Family History   Problem Relation Age of Onset    Heart disease Mother     No Known Problems Father     Heart disease Maternal Grandmother     Coronary artery disease Family       Past Medical History:   Diagnosis Date    Cardiac disorder     Congenital heart defect     last assessed 3/3/15, resolved 3/3/15    Endometrial polyp         Review of Systems   Respiratory: Negative  Cardiovascular: Negative  Gastrointestinal: Negative for constipation and diarrhea  Genitourinary: Negative for difficulty urinating, pelvic pain, vaginal bleeding, vaginal discharge, itching or odor  O:  Blood pressure 130/90, height 5' (1 524 m), weight 77 6 kg (171 lb)  Patient appears well and is not in distress  Neck is supple without masses  Breasts are symmetrical without mass, tenderness, nipple discharge, skin changes or adenopathy  Abdomen is soft and nontender without masses  External genitals are normal without lesions or rashes  Urethral meatus and urethra are normal  Bladder is normal to palpation  Vagina is normal without discharge or bleeding  Cervix is normal without discharge or lesion  Uterus is normal, mobile, nontender without palpable mass  Adnexa are normal, nontender, without palpable mass  A:  Yearly exam      P:   Pap with HPV done - will call with results   Mammo slip provided    Encouraged yearly stool testing for colon cancer screening   RTO one year for yearly exam or sooner as needed

## 2019-10-07 LAB
LAB AP GYN PRIMARY INTERPRETATION: NORMAL
Lab: NORMAL

## 2020-02-03 ENCOUNTER — HOSPITAL ENCOUNTER (OUTPATIENT)
Dept: MAMMOGRAPHY | Facility: CLINIC | Age: 50
Discharge: HOME | End: 2020-02-03
Payer: COMMERCIAL

## 2020-02-03 VITALS — WEIGHT: 171 LBS | HEIGHT: 60 IN | BODY MASS INDEX: 33.57 KG/M2

## 2020-02-03 DIAGNOSIS — Z12.39 SCREENING FOR MALIGNANT NEOPLASM OF BREAST: ICD-10-CM

## 2020-02-03 PROCEDURE — 77067 SCR MAMMO BI INCL CAD: CPT

## 2020-02-03 PROCEDURE — 77063 BREAST TOMOSYNTHESIS BI: CPT

## 2020-02-19 ENCOUNTER — HOSPITAL ENCOUNTER (OUTPATIENT)
Dept: ULTRASOUND IMAGING | Facility: CLINIC | Age: 50
Discharge: HOME | End: 2020-02-19
Payer: COMMERCIAL

## 2020-02-19 ENCOUNTER — HOSPITAL ENCOUNTER (OUTPATIENT)
Dept: MAMMOGRAPHY | Facility: CLINIC | Age: 50
Discharge: HOME | End: 2020-02-19
Payer: COMMERCIAL

## 2020-02-19 DIAGNOSIS — R92.8 ABNORMAL MAMMOGRAM: ICD-10-CM

## 2020-02-19 PROCEDURE — G0279 TOMOSYNTHESIS, MAMMO: HCPCS

## 2020-02-19 PROCEDURE — 76642 ULTRASOUND BREAST LIMITED: CPT

## 2020-02-19 PROCEDURE — 77065 DX MAMMO INCL CAD UNI: CPT

## 2020-10-06 ENCOUNTER — OFFICE VISIT (OUTPATIENT)
Dept: FAMILY MEDICINE CLINIC | Facility: CLINIC | Age: 50
End: 2020-10-06
Payer: COMMERCIAL

## 2020-10-06 DIAGNOSIS — Z20.822 EXPOSURE TO COVID-19 VIRUS: Primary | ICD-10-CM

## 2020-10-06 PROCEDURE — 99213 OFFICE O/P EST LOW 20 MIN: CPT | Performed by: NURSE PRACTITIONER

## 2020-10-06 PROCEDURE — 1036F TOBACCO NON-USER: CPT | Performed by: NURSE PRACTITIONER

## 2020-10-06 PROCEDURE — 3725F SCREEN DEPRESSION PERFORMED: CPT | Performed by: NURSE PRACTITIONER

## 2020-11-05 ENCOUNTER — ANNUAL EXAM (OUTPATIENT)
Dept: OBGYN CLINIC | Facility: CLINIC | Age: 50
End: 2020-11-05
Payer: COMMERCIAL

## 2020-11-05 VITALS
BODY MASS INDEX: 33.06 KG/M2 | WEIGHT: 168.4 LBS | TEMPERATURE: 97.7 F | HEIGHT: 60 IN | DIASTOLIC BLOOD PRESSURE: 96 MMHG | SYSTOLIC BLOOD PRESSURE: 160 MMHG

## 2020-11-05 DIAGNOSIS — Z01.419 ENCOUNTER FOR GYNECOLOGICAL EXAMINATION WITHOUT ABNORMAL FINDING: Primary | ICD-10-CM

## 2020-11-05 DIAGNOSIS — Z12.31 ENCOUNTER FOR SCREENING MAMMOGRAM FOR MALIGNANT NEOPLASM OF BREAST: ICD-10-CM

## 2020-11-05 PROCEDURE — 3008F BODY MASS INDEX DOCD: CPT | Performed by: NURSE PRACTITIONER

## 2020-11-05 PROCEDURE — S0612 ANNUAL GYNECOLOGICAL EXAMINA: HCPCS | Performed by: OBSTETRICS & GYNECOLOGY

## 2020-12-29 ENCOUNTER — VBI (OUTPATIENT)
Dept: ADMINISTRATIVE | Facility: OTHER | Age: 50
End: 2020-12-29

## 2021-03-31 DIAGNOSIS — Z23 ENCOUNTER FOR IMMUNIZATION: ICD-10-CM

## 2021-04-03 ENCOUNTER — IMMUNIZATIONS (OUTPATIENT)
Dept: FAMILY MEDICINE CLINIC | Facility: HOSPITAL | Age: 51
End: 2021-04-03

## 2021-04-03 DIAGNOSIS — Z23 ENCOUNTER FOR IMMUNIZATION: Primary | ICD-10-CM

## 2021-04-03 PROCEDURE — 0001A SARS-COV-2 / COVID-19 MRNA VACCINE (PFIZER-BIONTECH) 30 MCG: CPT

## 2021-04-03 PROCEDURE — 91300 SARS-COV-2 / COVID-19 MRNA VACCINE (PFIZER-BIONTECH) 30 MCG: CPT

## 2021-04-25 ENCOUNTER — IMMUNIZATIONS (OUTPATIENT)
Dept: FAMILY MEDICINE CLINIC | Facility: HOSPITAL | Age: 51
End: 2021-04-25

## 2021-04-25 DIAGNOSIS — Z23 ENCOUNTER FOR IMMUNIZATION: Primary | ICD-10-CM

## 2021-04-25 PROCEDURE — 91300 SARS-COV-2 / COVID-19 MRNA VACCINE (PFIZER-BIONTECH) 30 MCG: CPT

## 2021-04-25 PROCEDURE — 0002A SARS-COV-2 / COVID-19 MRNA VACCINE (PFIZER-BIONTECH) 30 MCG: CPT

## 2021-04-28 ENCOUNTER — OFFICE VISIT (OUTPATIENT)
Dept: FAMILY MEDICINE CLINIC | Facility: CLINIC | Age: 51
End: 2021-04-28
Payer: COMMERCIAL

## 2021-04-28 VITALS
SYSTOLIC BLOOD PRESSURE: 150 MMHG | DIASTOLIC BLOOD PRESSURE: 100 MMHG | OXYGEN SATURATION: 98 % | RESPIRATION RATE: 16 BRPM | TEMPERATURE: 99.1 F | WEIGHT: 162.8 LBS | HEIGHT: 60 IN | HEART RATE: 86 BPM | BODY MASS INDEX: 31.96 KG/M2

## 2021-04-28 DIAGNOSIS — Z13.1 SCREENING FOR DIABETES MELLITUS (DM): ICD-10-CM

## 2021-04-28 DIAGNOSIS — N30.01 ACUTE CYSTITIS WITH HEMATURIA: Primary | ICD-10-CM

## 2021-04-28 DIAGNOSIS — I10 HYPERTENSION, ESSENTIAL, BENIGN: ICD-10-CM

## 2021-04-28 DIAGNOSIS — Z13.220 SCREENING FOR HYPERLIPIDEMIA: ICD-10-CM

## 2021-04-28 LAB
SL AMB  POCT GLUCOSE, UA: ABNORMAL
SL AMB LEUKOCYTE ESTERASE,UA: ABNORMAL
SL AMB POCT BILIRUBIN,UA: ABNORMAL
SL AMB POCT BLOOD,UA: ABNORMAL
SL AMB POCT CLARITY,UA: ABNORMAL
SL AMB POCT COLOR,UA: YELLOW
SL AMB POCT KETONES,UA: ABNORMAL
SL AMB POCT NITRITE,UA: ABNORMAL
SL AMB POCT PH,UA: 6
SL AMB POCT SPECIFIC GRAVITY,UA: 1.02
SL AMB POCT URINE PROTEIN: ABNORMAL
SL AMB POCT UROBILINOGEN: ABNORMAL

## 2021-04-28 PROCEDURE — 3008F BODY MASS INDEX DOCD: CPT | Performed by: FAMILY MEDICINE

## 2021-04-28 PROCEDURE — 81002 URINALYSIS NONAUTO W/O SCOPE: CPT | Performed by: FAMILY MEDICINE

## 2021-04-28 PROCEDURE — 87077 CULTURE AEROBIC IDENTIFY: CPT | Performed by: FAMILY MEDICINE

## 2021-04-28 PROCEDURE — 87186 SC STD MICRODIL/AGAR DIL: CPT | Performed by: FAMILY MEDICINE

## 2021-04-28 PROCEDURE — 99214 OFFICE O/P EST MOD 30 MIN: CPT | Performed by: FAMILY MEDICINE

## 2021-04-28 PROCEDURE — 1036F TOBACCO NON-USER: CPT | Performed by: FAMILY MEDICINE

## 2021-04-28 PROCEDURE — 87086 URINE CULTURE/COLONY COUNT: CPT | Performed by: FAMILY MEDICINE

## 2021-04-28 RX ORDER — NITROFURANTOIN 25; 75 MG/1; MG/1
100 CAPSULE ORAL 2 TIMES DAILY
Qty: 10 CAPSULE | Refills: 0 | Status: SHIPPED | OUTPATIENT
Start: 2021-04-28 | End: 2021-05-03

## 2021-04-28 RX ORDER — LISINOPRIL 20 MG/1
20 TABLET ORAL DAILY
Qty: 90 TABLET | Refills: 1 | Status: SHIPPED | OUTPATIENT
Start: 2021-04-28 | End: 2021-12-27 | Stop reason: SDUPTHER

## 2021-04-28 NOTE — PATIENT INSTRUCTIONS
Infección en el tracto urinario en adultos mayores   LO QUE NECESITA SABER:   Jennifer infección en el tracto urinario (ITU) ocurre cuando entran bacterias en yadav tracto urinario  Yadav tracto urinario incluye sharon riñones, uréteres, vejiga y Gondregnies  La orina es producida en los riñones y fluye del uréter a la vejiga  La orina sale de la vejiga a través de la uretra  Jennifer infección del tracto urinario es más común in [place] parte inferior de yadav tracto urinario que incluye yadav vejiga y uretra  INSTRUCCIONES SOBRE EL YURIY HOSPITALARIA:   Regrese a la martin de emergencias si:  · Usted está orinando muy poco o nada en absoluto  · Usted está vomitando  · Usted tiene fiebre yuriy con temblor y escalofríos  · Usted tiene dolor en el costado o en la espalda que CHERRI  Llame a yadav médico si:  · Tiene fiebre  · Es Epi, y el flujo vaginal quevedo o amarillo ha aumentado  · Usted no siente mejoría después de 2 días de vick los antibióticos  · Usted tiene preguntas o inquietudes acerca de yadav condición o cuidado  Medicamentos:  · Los medicamentos ayudan a tratar la infección bacteriana o disminuir el dolor y la quemazón cuando usted [facility]  Es probable que usted también necesite medicamentos para disminuir la urgencia de orinar con frecuencia  Si tiene infecciones urinarias con frecuencia (llamadas recurrentes), se le pueden adrianne antibióticos para que los tome regularmente  [place] cuándo y [employer] antibióticos  El objetivo es prevenir las infecciones urinarias, carlee no causar resistencia a los antibióticos mediante el uso de antibióticos con demasiada frecuencia  · [place] sharon medicamentos vanessa se le haya indicado  Consulte con yadav médico si usted maximilian que yadav medicamento no le está ayudando o si presenta efectos secundarios  Infórmele si es alérgico a cualquier medicamento  Mantenga jennifer lista actualizada de los [place], las vitaminas y los productos herbales que jeison   Incluya los [employer] medicamentos: cantidad, frecuencia y motivo de administración  Traiga con usted la lista o los envases de las píldoras a sharon citas de seguimiento  Lleve la lista de los medicamentos con usted en puneet de yoselyn emergencia  Cuidados personales:  · [place] líquidos vanessa se le haya indicado  Los líquidos pueden ayudar a eliminar las bacterias del tracto urinario  Pregunte cuánto líquido debe vick cada día y cuáles líquidos son los más adecuados para usted  Es probable que usted necesite vick más líquidos de lo habitual para ayudar a deshacerse de la bacteria  No tome alcohol, cafeína ni jugos cítricos  Estos pueden irritar yadav vejiga y aumentar sharon síntomas  · La aplicación de calor sobre el abdomen de 20 a 30 minutos cada 2 horas por los [employer] indiquen  El calor ayuda a disminuir las molestias y la presión en yadav vejiga  Evite yoselyn ITU:  · Orine cuando sienta el impulso de hacerlo  No contenga la orina  Las bacterias pueden crecer si la orina permanece demasiado tiempo en la vejiga  Puede ser [place]la C[place] orinar al menos cada 3 o 4 horas  · Orine después de [employer] sexuales para eliminar las bacterias que pudieran ingresar en el tracto urinario veronique las relaciones sexuales  · Use ropa interior de algodón y ropa suelta  Los pantalones ajustados y la ropa interior de nylon pueden atrapar humedad y hacer que las bacterias crezcan  · El jugo de arándano o los suplementos de arándano pueden ayudar a prevenir las infecciones urinarias  Yadav médico puede recomendarle el jugo o suplemento adecuado para usted  · Las mujeres deberían limpiarse de adelante hacia atrás después de orinar o de realizar yoselyn evacuación intestinal  Valle podría evitar que los gérmenes entren en el tracto urinario  No tome duchas vaginales ni use desodorantes femeninos  Estos pueden cambiar el equilibrio químico de la vagina  También podrían darle un medicamento vaginal con estrógeno   Anita medicamento ayuda a [employer] infecciones urinarias recurrentes en mujeres que pardo pasado por la menopausia o que están en la perimenopausia  Acuda a sharon consultas de control con yadav médico según le indicaron  Anote sharon preguntas para que se acuerde de hacerlas veronique sharon visitas  © Copyright [address] Information is for End User's use only and may not be sold, redistributed or otherwise used for commercial purposes  All illustrations and images included in CareNotes® are the copyrighted property of A D A M , Inc  or [address] [place] [address] es sólo para uso en educación  Yadav intención no es darle un consejo médico sobre enfermedades o tratamientos  Colsulte con yadav [place] farmacéutico antes de seguir cualquier régimen médico para saber si es seguro y efectivo para usted

## 2021-04-28 NOTE — PROGRESS NOTES
Assessment/Plan:    Acute cystitis with hematuria  Advised to use Macrobid twice a day for 5 days  Return if you have fever, continued bleeding, or worsening symptoms  Get culture  Increase water  RTC in 2 weeks for physical and repeat culture  Diagnoses and all orders for this visit:    Acute cystitis with hematuria  -     nitrofurantoin (MACROBID) 100 mg capsule; Take 1 capsule (100 mg total) by mouth 2 (two) times a day for 5 days  -     Urine culture; Future  -     POCT urine dip    Hypertension, essential, benign  -     lisinopril (ZESTRIL) 20 mg tablet; Take 1 tablet (20 mg total) by mouth daily    Screening for diabetes mellitus (DM)  -     Comprehensive metabolic panel; Future    Screening for hyperlipidemia  -     Lipid panel; Future          Subjective: burning on urination     Patient ID: Lizette Berg is a 45 y o  female  HPI  Here for burning on urination that started today associated with blood in urine and frequency  She states that in the morning the urine is pink colored  Denies fevers, flank pain  She does not have a history of frequent urinary tract infections  The following portions of the patient's history were reviewed and updated as appropriate:   She   Patient Active Problem List    Diagnosis Date Noted    Acute cystitis with hematuria 2021    Hypothyroidism 2019    Vitamin D deficiency 2019    Hypokalemia 2019    ASD secundum 2018    Plantar fasciitis, left 2017    Dyslipidemia 2015    Valvular disease 2015    Hypertension, essential, benign 2013    Menorrhagia 2013     She  has a past surgical history that includes Cardiac surgery;  section; Endometrial biopsy; INSERTION OF INTRAUTERINE DEVICE (IUD); REMOVAL OF INTRAUTERINE DEVICE (IUD); and Foot surgery (Left)  Her family history includes Coronary artery disease in her family; Heart disease in her maternal grandmother and mother;  No Known Problems in her father, maternal aunt, maternal aunt, maternal aunt, maternal aunt, maternal grandfather, paternal aunt, paternal aunt, paternal aunt, paternal aunt, paternal grandfather, paternal grandmother, sister, and sister  She  reports that she has never smoked  She has never used smokeless tobacco  She reports that she does not drink alcohol or use drugs  Current Outpatient Medications   Medication Sig Dispense Refill    aspirin (ECOTRIN LOW STRENGTH) 81 mg EC tablet Take 81 mg by mouth daily      diclofenac sodium (VOLTAREN) 1 % PLEASE SEE ATTACHED FOR DETAILED DIRECTIONS  6    ergocalciferol (VITAMIN D2) 50,000 units Take 1 capsule (50,000 Units total) by mouth once a week 12 capsule 0    levothyroxine 25 mcg tablet Take 1 tablet (25 mcg total) by mouth daily 90 tablet 1    lisinopril (ZESTRIL) 20 mg tablet Take 1 tablet (20 mg total) by mouth daily 90 tablet 1    nitrofurantoin (MACROBID) 100 mg capsule Take 1 capsule (100 mg total) by mouth 2 (two) times a day for 5 days 10 capsule 0    omega-3-acid ethyl esters (LOVAZA) 1 g capsule Take 2 capsules (2 g total) by mouth 2 (two) times a day (Patient not taking: Reported on 4/28/2021) 360 capsule 1    pravastatin (PRAVACHOL) 40 mg tablet Take 1 tablet (40 mg total) by mouth daily (Patient not taking: Reported on 4/28/2021) 90 tablet 1     No current facility-administered medications for this visit       Review of Systems   Constitutional: Negative for fever and unexpected weight change  HENT: Negative for ear pain, sore throat and trouble swallowing  Eyes: Negative for pain and visual disturbance  Respiratory: Negative for cough, chest tightness, shortness of breath and wheezing  Cardiovascular: Negative for chest pain  Gastrointestinal: Negative for abdominal distention, abdominal pain, blood in stool, constipation, diarrhea, nausea and vomiting  Endocrine: Negative for polydipsia and polyuria     Genitourinary: Positive for dysuria, frequency, hematuria and urgency  Negative for decreased urine volume, vaginal bleeding, vaginal discharge and vaginal pain  Musculoskeletal: Negative for back pain and myalgias  Skin: Negative for rash  Neurological: Negative for syncope and headaches  Psychiatric/Behavioral: Negative for suicidal ideas  PHQ-9 Depression Screening    PHQ-9:   Frequency of the following problems over the past two weeks:               Objective:      /100 (BP Location: Left arm, Patient Position: Sitting, Cuff Size: Adult)   Pulse 86   Temp 99 1 °F (37 3 °C) (Tympanic)   Resp 16   Ht 5' (1 524 m)   Wt 73 8 kg (162 lb 12 8 oz)   LMP 02/01/2019 (Approximate)   SpO2 98%   BMI 31 79 kg/m²          Physical Exam  Constitutional:       Appearance: She is well-developed  HENT:      Head: Normocephalic and atraumatic  Right Ear: External ear normal       Left Ear: External ear normal       Mouth/Throat:      Pharynx: No oropharyngeal exudate  Eyes:      General: No scleral icterus  Conjunctiva/sclera: Conjunctivae normal       Pupils: Pupils are equal, round, and reactive to light  Neck:      Musculoskeletal: Normal range of motion and neck supple  Cardiovascular:      Rate and Rhythm: Normal rate and regular rhythm  Heart sounds: No murmur  No friction rub  No gallop  Pulmonary:      Effort: Pulmonary effort is normal  No respiratory distress  Breath sounds: Normal breath sounds  No wheezing or rales  Abdominal:      General: Bowel sounds are normal  There is no distension  Palpations: Abdomen is soft  There is no mass  Tenderness: There is no abdominal tenderness  There is no rebound  Musculoskeletal: Normal range of motion  Skin:     General: Skin is warm and dry  Neurological:      Mental Status: She is alert and oriented to person, place, and time

## 2021-04-28 NOTE — ASSESSMENT & PLAN NOTE
Advised to use Macrobid twice a day for 5 days  Return if you have fever, continued bleeding, or worsening symptoms  Get culture  Increase water  RTC in 2 weeks for physical and repeat culture

## 2021-05-01 LAB — BACTERIA UR CULT: ABNORMAL

## 2021-05-03 ENCOUNTER — APPOINTMENT (OUTPATIENT)
Dept: LAB | Facility: HOSPITAL | Age: 51
End: 2021-05-03
Payer: COMMERCIAL

## 2021-05-03 DIAGNOSIS — Z13.1 SCREENING FOR DIABETES MELLITUS (DM): ICD-10-CM

## 2021-05-03 DIAGNOSIS — Z13.220 SCREENING FOR HYPERLIPIDEMIA: ICD-10-CM

## 2021-05-03 LAB
ALBUMIN SERPL BCP-MCNC: 3.8 G/DL (ref 3.5–5)
ALP SERPL-CCNC: 77 U/L (ref 46–116)
ALT SERPL W P-5'-P-CCNC: 29 U/L (ref 12–78)
ANION GAP SERPL CALCULATED.3IONS-SCNC: 7 MMOL/L (ref 4–13)
AST SERPL W P-5'-P-CCNC: 14 U/L (ref 5–45)
BILIRUB SERPL-MCNC: 0.47 MG/DL (ref 0.2–1)
BUN SERPL-MCNC: 12 MG/DL (ref 5–25)
CALCIUM SERPL-MCNC: 9.1 MG/DL (ref 8.3–10.1)
CHLORIDE SERPL-SCNC: 110 MMOL/L (ref 100–108)
CHOLEST SERPL-MCNC: 226 MG/DL (ref 50–200)
CO2 SERPL-SCNC: 26 MMOL/L (ref 21–32)
CREAT SERPL-MCNC: 0.6 MG/DL (ref 0.6–1.3)
GFR SERPL CREATININE-BSD FRML MDRD: 105 ML/MIN/1.73SQ M
GLUCOSE P FAST SERPL-MCNC: 117 MG/DL (ref 65–99)
HDLC SERPL-MCNC: 43 MG/DL
LDLC SERPL CALC-MCNC: 147 MG/DL (ref 0–100)
NONHDLC SERPL-MCNC: 183 MG/DL
POTASSIUM SERPL-SCNC: 3.4 MMOL/L (ref 3.5–5.3)
PROT SERPL-MCNC: 7.7 G/DL (ref 6.4–8.2)
SODIUM SERPL-SCNC: 143 MMOL/L (ref 136–145)
TRIGL SERPL-MCNC: 178 MG/DL

## 2021-05-03 PROCEDURE — 80053 COMPREHEN METABOLIC PANEL: CPT

## 2021-05-03 PROCEDURE — 80061 LIPID PANEL: CPT

## 2021-05-03 PROCEDURE — 36415 COLL VENOUS BLD VENIPUNCTURE: CPT

## 2021-05-03 NOTE — RESULT ENCOUNTER NOTE
Please call patient with results  The bacteria in the urine was ecoli and you were given a good antibiotic that would of covered this organism

## 2021-05-11 ENCOUNTER — RA CDI HCC (OUTPATIENT)
Dept: OTHER | Facility: HOSPITAL | Age: 51
End: 2021-05-11

## 2021-05-11 NOTE — PROGRESS NOTES
[facility]          Chart reviewed, no opportunity found: CHART REVIEWED, NO OPPORTUNITY FOUND              Patients insurance company:  [facility] (Medicare Advantage and Commercial)

## 2021-05-18 ENCOUNTER — OFFICE VISIT (OUTPATIENT)
Dept: FAMILY MEDICINE CLINIC | Facility: CLINIC | Age: 51
End: 2021-05-18
Payer: COMMERCIAL

## 2021-05-18 VITALS
SYSTOLIC BLOOD PRESSURE: 138 MMHG | BODY MASS INDEX: 32.79 KG/M2 | HEART RATE: 75 BPM | HEIGHT: 60 IN | WEIGHT: 167 LBS | OXYGEN SATURATION: 96 % | DIASTOLIC BLOOD PRESSURE: 80 MMHG | TEMPERATURE: 97.2 F | RESPIRATION RATE: 17 BRPM

## 2021-05-18 DIAGNOSIS — Z12.12 SCREENING FOR COLORECTAL CANCER: ICD-10-CM

## 2021-05-18 DIAGNOSIS — E78.5 DYSLIPIDEMIA: ICD-10-CM

## 2021-05-18 DIAGNOSIS — N30.01 ACUTE CYSTITIS WITH HEMATURIA: ICD-10-CM

## 2021-05-18 DIAGNOSIS — E55.9 VITAMIN D DEFICIENCY: ICD-10-CM

## 2021-05-18 DIAGNOSIS — E87.6 HYPOKALEMIA: Primary | ICD-10-CM

## 2021-05-18 DIAGNOSIS — I10 HYPERTENSION, ESSENTIAL, BENIGN: ICD-10-CM

## 2021-05-18 DIAGNOSIS — E03.9 HYPOTHYROIDISM, UNSPECIFIED TYPE: ICD-10-CM

## 2021-05-18 DIAGNOSIS — Z12.11 SCREENING FOR COLORECTAL CANCER: ICD-10-CM

## 2021-05-18 DIAGNOSIS — R73.01 IFG (IMPAIRED FASTING GLUCOSE): ICD-10-CM

## 2021-05-18 LAB
SL AMB  POCT GLUCOSE, UA: NORMAL
SL AMB LEUKOCYTE ESTERASE,UA: NORMAL
SL AMB POCT BILIRUBIN,UA: NORMAL
SL AMB POCT BLOOD,UA: NORMAL
SL AMB POCT CLARITY,UA: CLEAR
SL AMB POCT COLOR,UA: YELLOW
SL AMB POCT KETONES,UA: NORMAL
SL AMB POCT NITRITE,UA: NORMAL
SL AMB POCT PH,UA: 5.5
SL AMB POCT SPECIFIC GRAVITY,UA: 1.02
SL AMB POCT URINE PROTEIN: NORMAL
SL AMB POCT UROBILINOGEN: 0.2

## 2021-05-18 PROCEDURE — 3079F DIAST BP 80-89 MM HG: CPT | Performed by: NURSE PRACTITIONER

## 2021-05-18 PROCEDURE — 99214 OFFICE O/P EST MOD 30 MIN: CPT | Performed by: NURSE PRACTITIONER

## 2021-05-18 PROCEDURE — 1036F TOBACCO NON-USER: CPT | Performed by: NURSE PRACTITIONER

## 2021-05-18 PROCEDURE — 3075F SYST BP GE 130 - 139MM HG: CPT | Performed by: NURSE PRACTITIONER

## 2021-05-18 PROCEDURE — 3008F BODY MASS INDEX DOCD: CPT | Performed by: NURSE PRACTITIONER

## 2021-05-18 PROCEDURE — 81002 URINALYSIS NONAUTO W/O SCOPE: CPT | Performed by: NURSE PRACTITIONER

## 2021-05-18 PROCEDURE — 3725F SCREEN DEPRESSION PERFORMED: CPT | Performed by: NURSE PRACTITIONER

## 2021-05-18 RX ORDER — PRAVASTATIN SODIUM 40 MG
40 TABLET ORAL DAILY
Qty: 90 TABLET | Refills: 1 | Status: SHIPPED | OUTPATIENT
Start: 2021-05-18 | End: 2021-12-27 | Stop reason: SDUPTHER

## 2021-05-18 RX ORDER — OMEGA-3-ACID ETHYL ESTERS 1 G/1
2 CAPSULE, LIQUID FILLED ORAL 2 TIMES DAILY
Qty: 360 CAPSULE | Refills: 1 | Status: SHIPPED | OUTPATIENT
Start: 2021-05-18 | End: 2021-12-27 | Stop reason: SDUPTHER

## 2021-05-18 NOTE — ASSESSMENT & PLAN NOTE
Lipid panel with total cholesterol 226, triglycerides 178, HDL 43,   Restart Pravastatin 40 mg daily and Omega 3 1g 2 capsules BID  Dietary modifications reviewed

## 2021-05-18 NOTE — ASSESSMENT & PLAN NOTE
Kaylan Barillasus on urine culture susceptible to Macrobid- she did complete a 5 day course of this   Symptoms returned shortly after  POC Urine dip today is entirely normal with no leukocytes, nitrites, glucose, blood, protein, ketones  Recommended to increase PO water intake, avoid bladder irritants  Call our office if symptoms worsen or persist

## 2021-05-18 NOTE — ASSESSMENT & PLAN NOTE
Updated TSH level ordered today  She is not currently taking her levothyroxine 25 mcg daily and will check lab work prior to re-starting this medication

## 2021-05-18 NOTE — PROGRESS NOTES
Assessment/Plan:    Reviewed current options for CRC screening- pt prefers to have a colonoscopy and she was referred to Colorectal today to schedule this    Reminded pt to schedule her Mammogram which is overdue (ordered by her gynecologist already)     Hypothyroidism   Updated TSH level ordered today  She is not currently taking her levothyroxine 25 mcg daily and will check lab work prior to re-starting this medication    Hypertension, essential, benign  BP is stable, continue Lisinopril 20 mg daily   Follow a low sodium diet     Acute cystitis with hematuria  Ecoli on urine culture susceptible to Macrobid- she did complete a 5 day course of this  Symptoms returned shortly after  POC Urine dip today is entirely normal with no leukocytes, nitrites, glucose, blood, protein, ketones  Recommended to increase PO water intake, avoid bladder irritants  Call our office if symptoms worsen or persist     Dyslipidemia  Lipid panel with total cholesterol 226, triglycerides 178, HDL 43,   Restart Pravastatin 40 mg daily and Omega 3 1g 2 capsules BID  Dietary modifications reviewed     Hypokalemia  Potassium at 3 4  Advised pt to increase potassium rich foods in diet  Repeat BMP as ordered     Vitamin D deficiency  Updated Vitamin D level ordered  No current Vitamin D supplements she is taking     IFG (impaired fasting glucose)  FBS at 117  A1C ordered  Follow a low carb/ low sugar diet, work on weight loss        Diagnoses and all orders for this visit:    Hypokalemia  -     Basic metabolic panel; Future    Dyslipidemia  -     omega-3-acid ethyl esters (LOVAZA) 1 g capsule; Take 2 capsules (2 g total) by mouth 2 (two) times a day  -     pravastatin (PRAVACHOL) 40 mg tablet; Take 1 tablet (40 mg total) by mouth daily    Acute cystitis with hematuria  -     POCT urine dip    Hypertension, essential, benign    Hypothyroidism, unspecified type  -     TSH, 3rd generation with Free T4 reflex;  Future    IFG (impaired fasting glucose)    Vitamin D deficiency  -     Vitamin D 25 hydroxy; Future    Screening for colorectal cancer  -     Ambulatory referral to Colorectal Surgery; Future          Subjective:      Patient ID: Robbie Martinez is a 45 y o  female  HPI     Patient presents herself today for a 2 week follow-up of acute cystitis and to review recent lab work     She was evaluated in our office on 04/28/2021, urine culture positive for E coli  She was treated with 5 day course of Macrobid, E coli was susceptible to this  Pt reports she initially had improvement with the Macrobid but symptoms quickly returned, has B/L flank pain R>L  Very slight dysuria  No hematuria, malodorous or cloudy urine  No increased urinary frequency or urgency     Recent lipid panel with total cholesterol 226, triglycerides 178, HDL 43,   Previously on Omega 3 and Pravastatin 40 mg- both of which the pt stopped on her own     Hypokalemia-recent K level is 3 4    IFG- FBS at 117, no A1C to review     HTN- taking lisinopril 20 mg daily   No diuretic use     Hypothyroidism- previously on Levothyroxine 25 mcg daily, hasn't been refilled in > 1 year   TSH at 3 770 from 7/9/2019, free T4 0 97    Vitamin D deficiency- last vitamin D level from 7/9/2019 at 17 6    Due for CRC  Mammogram has already been ordered by her Gynecologist, Dr Stephenie Gonzales     The following portions of the patient's history were reviewed and updated as appropriate: allergies, current medications, past family history, past medical history, past social history, past surgical history and problem list     Review of Systems   Constitutional: Positive for fatigue  Negative for chills, fever and unexpected weight change  HENT: Negative for ear pain and sore throat  Eyes: Negative for pain and visual disturbance  Respiratory: Negative for cough, shortness of breath and wheezing  Cardiovascular: Negative for chest pain, palpitations and leg swelling     Gastrointestinal: Negative for abdominal pain and vomiting  Genitourinary: Positive for dysuria and flank pain  Negative for decreased urine volume, difficulty urinating, dyspareunia, hematuria, menstrual problem, pelvic pain, urgency, vaginal bleeding, vaginal discharge and vaginal pain  Musculoskeletal: Negative for arthralgias and back pain  Skin: Negative for color change and rash  Neurological: Negative for seizures and syncope  Hematological: Negative for adenopathy  Does not bruise/bleed easily  Psychiatric/Behavioral: Negative for dysphoric mood, self-injury, sleep disturbance and suicidal ideas  The patient is not nervous/anxious  All other systems reviewed and are negative  Objective:      /80 (BP Location: Left arm, Patient Position: Sitting, Cuff Size: Adult)   Pulse 75   Temp (!) 97 2 °F (36 2 °C) (Tympanic)   Resp 17   Ht 5' (1 524 m)   Wt 75 8 kg (167 lb)   LMP 02/01/2019 (Approximate)   SpO2 96%   BMI 32 61 kg/m²          Physical Exam  Constitutional:       General: She is not in acute distress  Appearance: She is well-developed  She is obese  She is not ill-appearing, toxic-appearing or diaphoretic  HENT:      Head: Normocephalic and atraumatic  Eyes:      Extraocular Movements: Extraocular movements intact  Conjunctiva/sclera: Conjunctivae normal       Pupils: Pupils are equal, round, and reactive to light  Neck:      Musculoskeletal: Normal range of motion and neck supple  No neck rigidity or muscular tenderness  Thyroid: No thyromegaly  Vascular: No carotid bruit  Cardiovascular:      Rate and Rhythm: Normal rate and regular rhythm  Heart sounds: Normal heart sounds  No murmur  Pulmonary:      Effort: Pulmonary effort is normal  No respiratory distress  Breath sounds: Normal breath sounds  No wheezing  Abdominal:      General: Bowel sounds are normal  There is no distension or abdominal bruit  Palpations: Abdomen is soft   There is no fluid wave, hepatomegaly or splenomegaly  Tenderness: There is no abdominal tenderness  There is no right CVA tenderness, left CVA tenderness or guarding  Musculoskeletal: Normal range of motion  Lymphadenopathy:      Cervical: No cervical adenopathy  Skin:     General: Skin is warm and dry  Neurological:      General: No focal deficit present  Mental Status: She is alert and oriented to person, place, and time  Psychiatric:         Mood and Affect: Mood normal          Behavior: Behavior normal          Thought Content: Thought content normal          Judgment: Judgment normal        BMI Counseling: Body mass index is 32 61 kg/m²  The BMI is above normal  Nutrition recommendations include encouraging healthy choices of fruits and vegetables, moderation in carbohydrate intake and increasing intake of lean protein  Exercise recommendations include moderate physical activity 150 minutes/week

## 2021-05-26 ENCOUNTER — TRANSCRIBE ORDERS (OUTPATIENT)
Dept: LAB | Facility: HOSPITAL | Age: 51
End: 2021-05-26

## 2021-05-26 ENCOUNTER — APPOINTMENT (OUTPATIENT)
Dept: LAB | Facility: HOSPITAL | Age: 51
End: 2021-05-26
Payer: COMMERCIAL

## 2021-05-26 DIAGNOSIS — E03.9 HYPOTHYROIDISM, UNSPECIFIED TYPE: ICD-10-CM

## 2021-05-26 DIAGNOSIS — E87.6 HYPOKALEMIA: ICD-10-CM

## 2021-05-26 DIAGNOSIS — E55.9 VITAMIN D DEFICIENCY: ICD-10-CM

## 2021-05-26 LAB
25(OH)D3 SERPL-MCNC: 22 NG/ML (ref 30–100)
ANION GAP SERPL CALCULATED.3IONS-SCNC: 4 MMOL/L (ref 4–13)
BUN SERPL-MCNC: 10 MG/DL (ref 5–25)
CALCIUM SERPL-MCNC: 9.5 MG/DL (ref 8.3–10.1)
CHLORIDE SERPL-SCNC: 108 MMOL/L (ref 100–108)
CO2 SERPL-SCNC: 28 MMOL/L (ref 21–32)
CREAT SERPL-MCNC: 0.54 MG/DL (ref 0.6–1.3)
GFR SERPL CREATININE-BSD FRML MDRD: 109 ML/MIN/1.73SQ M
GLUCOSE P FAST SERPL-MCNC: 119 MG/DL (ref 65–99)
POTASSIUM SERPL-SCNC: 3.9 MMOL/L (ref 3.5–5.3)
SODIUM SERPL-SCNC: 140 MMOL/L (ref 136–145)
TSH SERPL DL<=0.05 MIU/L-ACNC: 3.32 UIU/ML (ref 0.36–3.74)

## 2021-05-26 PROCEDURE — 82306 VITAMIN D 25 HYDROXY: CPT

## 2021-05-26 PROCEDURE — 36415 COLL VENOUS BLD VENIPUNCTURE: CPT

## 2021-05-26 PROCEDURE — 84443 ASSAY THYROID STIM HORMONE: CPT

## 2021-05-26 PROCEDURE — 80048 BASIC METABOLIC PNL TOTAL CA: CPT

## 2021-05-27 DIAGNOSIS — R73.01 IFG (IMPAIRED FASTING GLUCOSE): Primary | ICD-10-CM

## 2021-05-28 ENCOUNTER — APPOINTMENT (OUTPATIENT)
Dept: LAB | Facility: HOSPITAL | Age: 51
End: 2021-05-28
Payer: COMMERCIAL

## 2021-05-28 DIAGNOSIS — R73.01 IFG (IMPAIRED FASTING GLUCOSE): ICD-10-CM

## 2021-05-28 LAB
EST. AVERAGE GLUCOSE BLD GHB EST-MCNC: 117 MG/DL
HBA1C MFR BLD: 5.7 %

## 2021-05-28 PROCEDURE — 83036 HEMOGLOBIN GLYCOSYLATED A1C: CPT

## 2021-05-28 PROCEDURE — 36415 COLL VENOUS BLD VENIPUNCTURE: CPT

## 2021-10-25 ENCOUNTER — VBI (OUTPATIENT)
Dept: ADMINISTRATIVE | Facility: OTHER | Age: 51
End: 2021-10-25

## 2021-11-22 ENCOUNTER — TELEPHONE (OUTPATIENT)
Dept: OBGYN CLINIC | Facility: CLINIC | Age: 51
End: 2021-11-22

## 2021-11-23 ENCOUNTER — OFFICE VISIT (OUTPATIENT)
Dept: FAMILY MEDICINE CLINIC | Facility: CLINIC | Age: 51
End: 2021-11-23
Payer: COMMERCIAL

## 2021-11-23 VITALS
TEMPERATURE: 97.6 F | OXYGEN SATURATION: 97 % | WEIGHT: 167 LBS | DIASTOLIC BLOOD PRESSURE: 78 MMHG | HEART RATE: 78 BPM | BODY MASS INDEX: 32.79 KG/M2 | RESPIRATION RATE: 17 BRPM | HEIGHT: 60 IN | SYSTOLIC BLOOD PRESSURE: 140 MMHG

## 2021-11-23 DIAGNOSIS — M62.830 SPASM OF MUSCLE OF LOWER BACK: ICD-10-CM

## 2021-11-23 DIAGNOSIS — E78.5 DYSLIPIDEMIA: ICD-10-CM

## 2021-11-23 DIAGNOSIS — I10 HYPERTENSION, ESSENTIAL, BENIGN: ICD-10-CM

## 2021-11-23 DIAGNOSIS — Z12.31 SCREENING MAMMOGRAM FOR BREAST CANCER: Primary | ICD-10-CM

## 2021-11-23 DIAGNOSIS — R73.01 IFG (IMPAIRED FASTING GLUCOSE): ICD-10-CM

## 2021-11-23 DIAGNOSIS — E03.9 HYPOTHYROIDISM, UNSPECIFIED TYPE: ICD-10-CM

## 2021-11-23 PROCEDURE — 3077F SYST BP >= 140 MM HG: CPT | Performed by: NURSE PRACTITIONER

## 2021-11-23 PROCEDURE — 3725F SCREEN DEPRESSION PERFORMED: CPT | Performed by: NURSE PRACTITIONER

## 2021-11-23 PROCEDURE — 3078F DIAST BP <80 MM HG: CPT | Performed by: NURSE PRACTITIONER

## 2021-11-23 PROCEDURE — 99214 OFFICE O/P EST MOD 30 MIN: CPT | Performed by: NURSE PRACTITIONER

## 2021-11-23 PROCEDURE — 1036F TOBACCO NON-USER: CPT | Performed by: NURSE PRACTITIONER

## 2021-11-23 PROCEDURE — 3008F BODY MASS INDEX DOCD: CPT | Performed by: NURSE PRACTITIONER

## 2021-11-23 RX ORDER — CYCLOBENZAPRINE HCL 10 MG
10 TABLET ORAL 2 TIMES DAILY PRN
Qty: 30 TABLET | Refills: 0 | Status: SHIPPED | OUTPATIENT
Start: 2021-11-23 | End: 2021-12-26

## 2021-11-24 ENCOUNTER — TELEPHONE (OUTPATIENT)
Dept: FAMILY MEDICINE CLINIC | Facility: CLINIC | Age: 51
End: 2021-11-24

## 2021-12-04 ENCOUNTER — LAB (OUTPATIENT)
Dept: LAB | Facility: HOSPITAL | Age: 51
End: 2021-12-04
Payer: COMMERCIAL

## 2021-12-04 DIAGNOSIS — E78.5 DYSLIPIDEMIA: ICD-10-CM

## 2021-12-04 DIAGNOSIS — E03.9 HYPOTHYROIDISM, UNSPECIFIED TYPE: ICD-10-CM

## 2021-12-04 DIAGNOSIS — I10 HYPERTENSION, ESSENTIAL, BENIGN: ICD-10-CM

## 2021-12-04 DIAGNOSIS — R73.01 IFG (IMPAIRED FASTING GLUCOSE): ICD-10-CM

## 2021-12-04 LAB
ALBUMIN SERPL BCP-MCNC: 3.8 G/DL (ref 3.5–5)
ALP SERPL-CCNC: 66 U/L (ref 46–116)
ALT SERPL W P-5'-P-CCNC: 26 U/L (ref 12–78)
ANION GAP SERPL CALCULATED.3IONS-SCNC: 5 MMOL/L (ref 4–13)
AST SERPL W P-5'-P-CCNC: 21 U/L (ref 5–45)
BASOPHILS # BLD AUTO: 0.05 THOUSANDS/ΜL (ref 0–0.1)
BASOPHILS NFR BLD AUTO: 1 % (ref 0–1)
BILIRUB SERPL-MCNC: 0.39 MG/DL (ref 0.2–1)
BUN SERPL-MCNC: 10 MG/DL (ref 5–25)
CALCIUM SERPL-MCNC: 9 MG/DL (ref 8.3–10.1)
CHLORIDE SERPL-SCNC: 108 MMOL/L (ref 100–108)
CHOLEST SERPL-MCNC: 244 MG/DL
CO2 SERPL-SCNC: 26 MMOL/L (ref 21–32)
CREAT SERPL-MCNC: 0.57 MG/DL (ref 0.6–1.3)
EOSINOPHIL # BLD AUTO: 0.25 THOUSAND/ΜL (ref 0–0.61)
EOSINOPHIL NFR BLD AUTO: 4 % (ref 0–6)
ERYTHROCYTE [DISTWIDTH] IN BLOOD BY AUTOMATED COUNT: 12.9 % (ref 11.6–15.1)
EST. AVERAGE GLUCOSE BLD GHB EST-MCNC: 126 MG/DL
GFR SERPL CREATININE-BSD FRML MDRD: 107 ML/MIN/1.73SQ M
GLUCOSE P FAST SERPL-MCNC: 106 MG/DL (ref 65–99)
HBA1C MFR BLD: 6 %
HCT VFR BLD AUTO: 42.2 % (ref 34.8–46.1)
HDLC SERPL-MCNC: 46 MG/DL
HGB BLD-MCNC: 14.6 G/DL (ref 11.5–15.4)
IMM GRANULOCYTES # BLD AUTO: 0.02 THOUSAND/UL (ref 0–0.2)
IMM GRANULOCYTES NFR BLD AUTO: 0 % (ref 0–2)
LDLC SERPL CALC-MCNC: 177 MG/DL (ref 0–100)
LYMPHOCYTES # BLD AUTO: 2.11 THOUSANDS/ΜL (ref 0.6–4.47)
LYMPHOCYTES NFR BLD AUTO: 30 % (ref 14–44)
MCH RBC QN AUTO: 27.6 PG (ref 26.8–34.3)
MCHC RBC AUTO-ENTMCNC: 34.6 G/DL (ref 31.4–37.4)
MCV RBC AUTO: 80 FL (ref 82–98)
MONOCYTES # BLD AUTO: 0.47 THOUSAND/ΜL (ref 0.17–1.22)
MONOCYTES NFR BLD AUTO: 7 % (ref 4–12)
NEUTROPHILS # BLD AUTO: 4.05 THOUSANDS/ΜL (ref 1.85–7.62)
NEUTS SEG NFR BLD AUTO: 58 % (ref 43–75)
NONHDLC SERPL-MCNC: 198 MG/DL
NRBC BLD AUTO-RTO: 0 /100 WBCS
PLATELET # BLD AUTO: 265 THOUSANDS/UL (ref 149–390)
PMV BLD AUTO: 10.9 FL (ref 8.9–12.7)
POTASSIUM SERPL-SCNC: 3.8 MMOL/L (ref 3.5–5.3)
PROT SERPL-MCNC: 7.7 G/DL (ref 6.4–8.2)
RBC # BLD AUTO: 5.29 MILLION/UL (ref 3.81–5.12)
SODIUM SERPL-SCNC: 139 MMOL/L (ref 136–145)
TRIGL SERPL-MCNC: 106 MG/DL
TSH SERPL DL<=0.05 MIU/L-ACNC: 1.83 UIU/ML (ref 0.36–3.74)
WBC # BLD AUTO: 6.95 THOUSAND/UL (ref 4.31–10.16)

## 2021-12-04 PROCEDURE — 36415 COLL VENOUS BLD VENIPUNCTURE: CPT

## 2021-12-04 PROCEDURE — 85025 COMPLETE CBC W/AUTO DIFF WBC: CPT

## 2021-12-04 PROCEDURE — 80053 COMPREHEN METABOLIC PANEL: CPT

## 2021-12-04 PROCEDURE — 84443 ASSAY THYROID STIM HORMONE: CPT

## 2021-12-04 PROCEDURE — 83036 HEMOGLOBIN GLYCOSYLATED A1C: CPT

## 2021-12-04 PROCEDURE — 80061 LIPID PANEL: CPT

## 2021-12-11 ENCOUNTER — IMMUNIZATIONS (OUTPATIENT)
Dept: FAMILY MEDICINE CLINIC | Facility: HOSPITAL | Age: 51
End: 2021-12-11

## 2021-12-11 DIAGNOSIS — Z23 ENCOUNTER FOR IMMUNIZATION: Primary | ICD-10-CM

## 2021-12-11 PROCEDURE — 91300 COVID-19 PFIZER VACC 0.3 ML: CPT

## 2021-12-11 PROCEDURE — 0001A COVID-19 PFIZER VACC 0.3 ML: CPT

## 2021-12-21 DIAGNOSIS — M62.830 SPASM OF MUSCLE OF LOWER BACK: ICD-10-CM

## 2021-12-26 RX ORDER — CYCLOBENZAPRINE HCL 10 MG
TABLET ORAL
Qty: 30 TABLET | Refills: 0 | Status: SHIPPED | OUTPATIENT
Start: 2021-12-26 | End: 2022-02-04 | Stop reason: HOSPADM

## 2021-12-27 ENCOUNTER — OFFICE VISIT (OUTPATIENT)
Dept: GASTROENTEROLOGY | Facility: CLINIC | Age: 51
End: 2021-12-27
Payer: COMMERCIAL

## 2021-12-27 VITALS
WEIGHT: 170 LBS | HEIGHT: 60 IN | SYSTOLIC BLOOD PRESSURE: 180 MMHG | HEART RATE: 66 BPM | TEMPERATURE: 98.9 F | BODY MASS INDEX: 33.38 KG/M2 | DIASTOLIC BLOOD PRESSURE: 100 MMHG

## 2021-12-27 DIAGNOSIS — E78.5 DYSLIPIDEMIA: ICD-10-CM

## 2021-12-27 DIAGNOSIS — Z12.11 COLON CANCER SCREENING: Primary | ICD-10-CM

## 2021-12-27 DIAGNOSIS — I10 HYPERTENSION, ESSENTIAL, BENIGN: ICD-10-CM

## 2021-12-27 PROCEDURE — 99203 OFFICE O/P NEW LOW 30 MIN: CPT | Performed by: PHYSICIAN ASSISTANT

## 2021-12-27 PROCEDURE — 1036F TOBACCO NON-USER: CPT | Performed by: PHYSICIAN ASSISTANT

## 2021-12-27 PROCEDURE — 3080F DIAST BP >= 90 MM HG: CPT | Performed by: PHYSICIAN ASSISTANT

## 2021-12-27 PROCEDURE — 3008F BODY MASS INDEX DOCD: CPT | Performed by: PHYSICIAN ASSISTANT

## 2021-12-27 PROCEDURE — 3077F SYST BP >= 140 MM HG: CPT | Performed by: PHYSICIAN ASSISTANT

## 2021-12-27 RX ORDER — SODIUM, POTASSIUM,MAG SULFATES 17.5-3.13G
SOLUTION, RECONSTITUTED, ORAL ORAL
Qty: 354 ML | Refills: 0 | Status: SHIPPED | OUTPATIENT
Start: 2021-12-27 | End: 2021-12-27

## 2021-12-29 RX ORDER — LISINOPRIL 20 MG/1
20 TABLET ORAL DAILY
Qty: 90 TABLET | Refills: 1 | Status: SHIPPED | OUTPATIENT
Start: 2021-12-29 | End: 2022-03-17 | Stop reason: SDUPTHER

## 2021-12-29 RX ORDER — OMEGA-3-ACID ETHYL ESTERS 1 G/1
2 CAPSULE, LIQUID FILLED ORAL 2 TIMES DAILY
Qty: 360 CAPSULE | Refills: 1 | Status: SHIPPED | OUTPATIENT
Start: 2021-12-29 | End: 2022-08-08 | Stop reason: SDUPTHER

## 2021-12-29 RX ORDER — PRAVASTATIN SODIUM 40 MG
40 TABLET ORAL DAILY
Qty: 90 TABLET | Refills: 1 | Status: SHIPPED | OUTPATIENT
Start: 2021-12-29 | End: 2022-02-04 | Stop reason: HOSPADM

## 2022-01-31 ENCOUNTER — HOSPITAL ENCOUNTER (OUTPATIENT)
Dept: RADIOLOGY | Age: 52
Discharge: HOME | DRG: 305 | End: 2022-01-31
Payer: COMMERCIAL

## 2022-01-31 VITALS — HEIGHT: 60 IN | BODY MASS INDEX: 33.38 KG/M2 | WEIGHT: 170 LBS

## 2022-01-31 DIAGNOSIS — Z12.31 SCREENING MAMMOGRAM FOR BREAST CANCER: ICD-10-CM

## 2022-01-31 PROCEDURE — 77063 BREAST TOMOSYNTHESIS BI: CPT

## 2022-01-31 PROCEDURE — 77067 SCR MAMMO BI INCL CAD: CPT

## 2022-02-02 ENCOUNTER — HOSPITAL ENCOUNTER (INPATIENT)
Facility: HOSPITAL | Age: 52
LOS: 2 days | Discharge: HOME | DRG: 305 | End: 2022-02-04
Attending: EMERGENCY MEDICINE | Admitting: HOSPITALIST
Payer: COMMERCIAL

## 2022-02-02 ENCOUNTER — APPOINTMENT (INPATIENT)
Dept: NON INVASIVE DIAGNOSTICS | Facility: HOSPITAL | Age: 52
DRG: 305 | End: 2022-02-02
Payer: COMMERCIAL

## 2022-02-02 ENCOUNTER — APPOINTMENT (EMERGENCY)
Dept: RADIOLOGY | Facility: HOSPITAL | Age: 52
DRG: 305 | End: 2022-02-02
Payer: COMMERCIAL

## 2022-02-02 DIAGNOSIS — R07.9 CHEST PAIN: Primary | ICD-10-CM

## 2022-02-02 DIAGNOSIS — I16.0 HYPERTENSIVE URGENCY: ICD-10-CM

## 2022-02-02 LAB
2HR DELTA HS TROPONIN: -1 NG/L
4HR DELTA HS TROPONIN: 0 NG/L
ALBUMIN SERPL BCP-MCNC: 4.1 G/DL (ref 3.5–5)
ALP SERPL-CCNC: 75 U/L (ref 46–116)
ALT SERPL W P-5'-P-CCNC: 29 U/L (ref 12–78)
ANION GAP SERPL CALCULATED.3IONS-SCNC: 8 MMOL/L (ref 4–13)
AORTIC ROOT: 3 CM
APICAL FOUR CHAMBER EJECTION FRACTION: 64 %
ASCENDING AORTA: 3 CM (ref 1.94–2.91)
AST SERPL W P-5'-P-CCNC: 19 U/L (ref 5–45)
ATRIAL RATE: 79 BPM
BASOPHILS # BLD AUTO: 0.04 THOUSANDS/ΜL (ref 0–0.1)
BASOPHILS NFR BLD AUTO: 1 % (ref 0–1)
BILIRUB SERPL-MCNC: 0.28 MG/DL (ref 0.2–1)
BUN SERPL-MCNC: 21 MG/DL (ref 5–25)
CALCIUM SERPL-MCNC: 9.9 MG/DL (ref 8.3–10.1)
CARDIAC TROPONIN I PNL SERPL HS: 3 NG/L
CARDIAC TROPONIN I PNL SERPL HS: 4 NG/L
CARDIAC TROPONIN I PNL SERPL HS: 4 NG/L
CHLORIDE SERPL-SCNC: 108 MMOL/L (ref 100–108)
CO2 SERPL-SCNC: 23 MMOL/L (ref 21–32)
CREAT SERPL-MCNC: 0.63 MG/DL (ref 0.6–1.3)
E WAVE DECELERATION TIME: 206 MS
EOSINOPHIL # BLD AUTO: 0.16 THOUSAND/ΜL (ref 0–0.61)
EOSINOPHIL NFR BLD AUTO: 2 % (ref 0–6)
ERYTHROCYTE [DISTWIDTH] IN BLOOD BY AUTOMATED COUNT: 12.3 % (ref 11.6–15.1)
FRACTIONAL SHORTENING: 36 % (ref 28–44)
GFR SERPL CREATININE-BSD FRML MDRD: 103 ML/MIN/1.73SQ M
GLUCOSE SERPL-MCNC: 104 MG/DL (ref 65–140)
HCT VFR BLD AUTO: 42.2 % (ref 34.8–46.1)
HGB BLD-MCNC: 14.5 G/DL (ref 11.5–15.4)
IMM GRANULOCYTES # BLD AUTO: 0.03 THOUSAND/UL (ref 0–0.2)
IMM GRANULOCYTES NFR BLD AUTO: 0 % (ref 0–2)
INTERVENTRICULAR SEPTUM IN DIASTOLE (PARASTERNAL SHORT AXIS VIEW): 0.9 CM (ref 0.52–0.96)
LEFT ATRIUM AREA SYSTOLE SINGLE PLANE A4C: 14.1 CM2
LEFT ATRIUM SIZE: 3.1 CM
LEFT INTERNAL DIMENSION IN SYSTOLE: 3 CM (ref 2.1–4)
LEFT VENTRICULAR INTERNAL DIMENSION IN DIASTOLE: 4.7 CM (ref 4.18–6.23)
LEFT VENTRICULAR POSTERIOR WALL IN END DIASTOLE: 1 CM (ref 0.5–0.95)
LEFT VENTRICULAR STROKE VOLUME: 65 ML
LYMPHOCYTES # BLD AUTO: 1.95 THOUSANDS/ΜL (ref 0.6–4.47)
LYMPHOCYTES NFR BLD AUTO: 24 % (ref 14–44)
MCH RBC QN AUTO: 28 PG (ref 26.8–34.3)
MCHC RBC AUTO-ENTMCNC: 34.4 G/DL (ref 31.4–37.4)
MCV RBC AUTO: 82 FL (ref 82–98)
MONOCYTES # BLD AUTO: 0.54 THOUSAND/ΜL (ref 0.17–1.22)
MONOCYTES NFR BLD AUTO: 7 % (ref 4–12)
MV E'TISSUE VEL-SEP: 9 CM/S
MV PEAK A VEL: 0.91 M/S
MV PEAK E VEL: 84 CM/S
MV STENOSIS PRESSURE HALF TIME: 0 MS
NEUTROPHILS # BLD AUTO: 5.55 THOUSANDS/ΜL (ref 1.85–7.62)
NEUTS SEG NFR BLD AUTO: 66 % (ref 43–75)
NRBC BLD AUTO-RTO: 0 /100 WBCS
P AXIS: 45 DEGREES
PLATELET # BLD AUTO: 286 THOUSANDS/UL (ref 149–390)
PMV BLD AUTO: 10.9 FL (ref 8.9–12.7)
POTASSIUM SERPL-SCNC: 3.6 MMOL/L (ref 3.5–5.3)
PR INTERVAL: 148 MS
PROT SERPL-MCNC: 8.6 G/DL (ref 6.4–8.2)
QRS AXIS: 31 DEGREES
QRSD INTERVAL: 98 MS
QT INTERVAL: 372 MS
QTC INTERVAL: 426 MS
RBC # BLD AUTO: 5.17 MILLION/UL (ref 3.81–5.12)
RIGHT ATRIUM AREA SYSTOLE A4C: 14.4 CM2
RIGHT VENTRICLE ID DIMENSION: 2.9 CM
SL CV LV EF: 60
SL CV PED ECHO LEFT VENTRICLE DIASTOLIC VOLUME (MOD BIPLANE) 2D: 102 ML
SL CV PED ECHO LEFT VENTRICLE SYSTOLIC VOLUME (MOD BIPLANE) 2D: 36 ML
SODIUM SERPL-SCNC: 139 MMOL/L (ref 136–145)
T WAVE AXIS: 32 DEGREES
TRICUSPID ANNULAR PLANE SYSTOLIC EXCURSION: 1.65 CM
TSH SERPL DL<=0.05 MIU/L-ACNC: 2.91 UIU/ML (ref 0.36–3.74)
VENTRICULAR RATE: 79 BPM
WBC # BLD AUTO: 8.27 THOUSAND/UL (ref 4.31–10.16)
Z-SCORE OF ASCENDING AORTA: 2.34
Z-SCORE OF INTERVENTRICULAR SEPTUM IN END DIASTOLE: 1.39
Z-SCORE OF LEFT VENTRICULAR DIMENSION IN END SYSTOLE: -0.83
Z-SCORE OF LEFT VENTRICULAR POSTERIOR WALL IN END DIASTOLE: 2.37

## 2022-02-02 PROCEDURE — 71046 X-RAY EXAM CHEST 2 VIEWS: CPT

## 2022-02-02 PROCEDURE — 36415 COLL VENOUS BLD VENIPUNCTURE: CPT | Performed by: PHYSICIAN ASSISTANT

## 2022-02-02 PROCEDURE — G1004 CDSM NDSC: HCPCS

## 2022-02-02 PROCEDURE — 96374 THER/PROPH/DIAG INJ IV PUSH: CPT

## 2022-02-02 PROCEDURE — 93005 ELECTROCARDIOGRAM TRACING: CPT

## 2022-02-02 PROCEDURE — 70450 CT HEAD/BRAIN W/O DYE: CPT

## 2022-02-02 PROCEDURE — 93306 TTE W/DOPPLER COMPLETE: CPT | Performed by: INTERNAL MEDICINE

## 2022-02-02 PROCEDURE — 93010 ELECTROCARDIOGRAM REPORT: CPT | Performed by: INTERNAL MEDICINE

## 2022-02-02 PROCEDURE — 96375 TX/PRO/DX INJ NEW DRUG ADDON: CPT

## 2022-02-02 PROCEDURE — 99285 EMERGENCY DEPT VISIT HI MDM: CPT | Performed by: PHYSICIAN ASSISTANT

## 2022-02-02 PROCEDURE — 85025 COMPLETE CBC W/AUTO DIFF WBC: CPT | Performed by: PHYSICIAN ASSISTANT

## 2022-02-02 PROCEDURE — 84484 ASSAY OF TROPONIN QUANT: CPT | Performed by: PHYSICIAN ASSISTANT

## 2022-02-02 PROCEDURE — 99285 EMERGENCY DEPT VISIT HI MDM: CPT

## 2022-02-02 PROCEDURE — 84443 ASSAY THYROID STIM HORMONE: CPT | Performed by: PHYSICIAN ASSISTANT

## 2022-02-02 PROCEDURE — 80053 COMPREHEN METABOLIC PANEL: CPT | Performed by: PHYSICIAN ASSISTANT

## 2022-02-02 PROCEDURE — 99222 1ST HOSP IP/OBS MODERATE 55: CPT | Performed by: INTERNAL MEDICINE

## 2022-02-02 PROCEDURE — 99223 1ST HOSP IP/OBS HIGH 75: CPT | Performed by: INTERNAL MEDICINE

## 2022-02-02 PROCEDURE — 93306 TTE W/DOPPLER COMPLETE: CPT

## 2022-02-02 RX ORDER — LABETALOL 200 MG/1
200 TABLET, FILM COATED ORAL EVERY 12 HOURS SCHEDULED
Status: DISCONTINUED | OUTPATIENT
Start: 2022-02-02 | End: 2022-02-02

## 2022-02-02 RX ORDER — LABETALOL 20 MG/4 ML (5 MG/ML) INTRAVENOUS SYRINGE
10 ONCE
Status: COMPLETED | OUTPATIENT
Start: 2022-02-02 | End: 2022-02-02

## 2022-02-02 RX ORDER — LISINOPRIL 20 MG/1
20 TABLET ORAL DAILY
Status: DISCONTINUED | OUTPATIENT
Start: 2022-02-03 | End: 2022-02-04 | Stop reason: HOSPADM

## 2022-02-02 RX ORDER — LABETALOL 200 MG/1
200 TABLET, FILM COATED ORAL EVERY 12 HOURS SCHEDULED
Status: DISCONTINUED | OUTPATIENT
Start: 2022-02-02 | End: 2022-02-04 | Stop reason: HOSPADM

## 2022-02-02 RX ORDER — PRAVASTATIN SODIUM 20 MG
40 TABLET ORAL DAILY
Status: DISCONTINUED | OUTPATIENT
Start: 2022-02-02 | End: 2022-02-03

## 2022-02-02 RX ORDER — AMLODIPINE BESYLATE 5 MG/1
5 TABLET ORAL DAILY
Status: DISCONTINUED | OUTPATIENT
Start: 2022-02-02 | End: 2022-02-04 | Stop reason: HOSPADM

## 2022-02-02 RX ORDER — ASPIRIN 81 MG/1
81 TABLET ORAL DAILY
Status: DISCONTINUED | OUTPATIENT
Start: 2022-02-02 | End: 2022-02-04 | Stop reason: HOSPADM

## 2022-02-02 RX ORDER — HEPARIN SODIUM 5000 [USP'U]/ML
5000 INJECTION, SOLUTION INTRAVENOUS; SUBCUTANEOUS EVERY 8 HOURS SCHEDULED
Status: DISCONTINUED | OUTPATIENT
Start: 2022-02-02 | End: 2022-02-02

## 2022-02-02 RX ORDER — HEPARIN SODIUM 5000 [USP'U]/ML
5000 INJECTION, SOLUTION INTRAVENOUS; SUBCUTANEOUS EVERY 8 HOURS SCHEDULED
Status: DISCONTINUED | OUTPATIENT
Start: 2022-02-02 | End: 2022-02-04 | Stop reason: HOSPADM

## 2022-02-02 RX ORDER — ONDANSETRON 2 MG/ML
4 INJECTION INTRAMUSCULAR; INTRAVENOUS ONCE
Status: COMPLETED | OUTPATIENT
Start: 2022-02-02 | End: 2022-02-02

## 2022-02-02 RX ORDER — CHLORAL HYDRATE 500 MG
1000 CAPSULE ORAL 2 TIMES DAILY
Status: DISCONTINUED | OUTPATIENT
Start: 2022-02-02 | End: 2022-02-04 | Stop reason: HOSPADM

## 2022-02-02 RX ADMIN — PRAVASTATIN SODIUM 40 MG: 40 TABLET ORAL at 16:28

## 2022-02-02 RX ADMIN — OMEGA-3 FATTY ACIDS CAP 1000 MG 1000 MG: 1000 CAP at 21:07

## 2022-02-02 RX ADMIN — HEPARIN SODIUM 5000 UNITS: 5000 INJECTION INTRAVENOUS; SUBCUTANEOUS at 16:28

## 2022-02-02 RX ADMIN — LABETALOL HYDROCHLORIDE 10 MG: 5 INJECTION, SOLUTION INTRAVENOUS at 09:13

## 2022-02-02 RX ADMIN — LABETALOL HYDROCHLORIDE 10 MG: 5 INJECTION, SOLUTION INTRAVENOUS at 16:28

## 2022-02-02 RX ADMIN — ONDANSETRON 4 MG: 2 INJECTION INTRAMUSCULAR; INTRAVENOUS at 11:25

## 2022-02-02 RX ADMIN — MORPHINE SULFATE 2 MG: 2 INJECTION, SOLUTION INTRAMUSCULAR; INTRAVENOUS at 11:25

## 2022-02-02 RX ADMIN — ASPIRIN 81 MG: 81 TABLET, COATED ORAL at 16:28

## 2022-02-02 RX ADMIN — HEPARIN SODIUM 5000 UNITS: 5000 INJECTION INTRAVENOUS; SUBCUTANEOUS at 21:07

## 2022-02-02 RX ADMIN — AMLODIPINE BESYLATE 5 MG: 5 TABLET ORAL at 16:28

## 2022-02-02 NOTE — CONSULTS
Reason for Consult / Principal Problem:HTN     Physician Requesting Consult:  Lizzy Martell MD    Cardiologist: Dr Caballero [employer] [facility] [employer]      Current Problem List   Active Problems:    * No active hospital problems  *    Assessment/Plan:    1  Hypertensive urgency - noted to have blood pressure greater than 172 systolic on arrival  Received one dose of labetalol  No resolution of chest pain with it  Troponin negative X 3  EKG unchanged from baseline  · Would initiate labetalol for blood pressure lowering - if does not drop by 25% map would start IV drip  · Restart patients home lisinopril  · Would start on additional agent with norvasc 5 mg  · Also would start lebatolol 200 mg q 12 hours  2   Chest pain - reported to have chest pain that is constant for 1 day - no evidence of NSTEI as patient has no EKG changes, and no troponin elevation  ·  Will reassess once bp is lowered - concerning for typical angina - if no improvement would pursue stress test    · Cont to monitor on tele  · Restablish care outpatient with Dr Narciso Harris  Subjective     CC: chest pain  HPI: This is a 58-year-old female with a past medical history significant for dyslipidemia, hypothyroidism, ASD secundum status post closure at age 31 who presented to [facility] on 02/02/2022 secondary to chest pain  On presentation the patient stated that she had chest pain for approximately 2 hours PTA with associated shortness of breath she describes it a sharp stabbing pain  She also admitted to her blood pressure being high for the past week  On presentation her blood pressure was found to be 213/100  Pulse is 87  Labs were notable for a normal CMP  High sensitive troponin was 4  TSH was normal   CBC was unremarkable  Chest x-ray showed no acute cardiopulmonary disease  An EKG was performed which was notable for borderline incomplete right bundle-branch block  Normal axis    T-wave inversion in V2 through V4  Q-wave and V3  Sinus rhythm  The patient received labetalol 10 mg once  Cardiology is consulted for further management  The patient reports to me she has had elevated blood pressures for the last week  She states she has been taking all of her medications which include lisinopril 20 mg  She recently got a blood pressure cuff and has been noting that it is high  She does not smoke  She reports no changes in vision  She has a slight headache  She received lebatolol in the ED  She otherwise denies any other symptoms  She also reports yesterday she started having chest pain  She stated it was sudden onset and was worse with exertion  She describes it as a sharp like chest pain in the left of her chest with radiation to her left arm  She reports when her blood pressure was lowered she had no change in her chest pain  She has no tearing back pain  It does radiate to her arm  She reports no recent similar episodes  She has no history of MI  She did follow with Dr Sd Fox but has not seen him in sometime  Family history is negative for CAD  She did have a cardiac cath in 2015 that was normal per Dr Yareli Vance  Troponins here were negative X 2  She reports chest pain is constant  Telemetry: no events  EKG: Incomplete RBBB  T wave inversions in anterior precordial leads similar to baseline  ECHO: 2015: LEFT VENTRICLE: Size was normal  Systolic function was normal  Ejection   fraction was estimated in the range of 60 % to 65 %  There were no regional   wall motion abnormalities  Wall thickness was at the upper limits of normal        Stress test: 2015: IMPRESSIONS: Normal study after maximal exercise  Myocardial perfusion imaging  was normal at rest and with stress   Left ventricular systolic function was  normal          Family History:   Family History   Problem Relation Age of Onset    Heart disease Mother     No Known Problems Father     Heart disease Maternal Grandmother     Coronary artery disease Family     No Known Problems Sister     No Known Problems Maternal Grandfather     No Known Problems Paternal Grandmother     No Known Problems Paternal Grandfather     No Known Problems Sister     No Known Problems Maternal Aunt     No Known Problems Maternal Aunt     No Known Problems Maternal Aunt     No Known Problems Maternal Aunt     No Known Problems Paternal Aunt     No Known Problems Paternal Aunt     No Known Problems Paternal Aunt     No Known Problems Paternal Aunt      Historical Information   Past Medical History:   Diagnosis Date    Cardiac disorder     Congenital heart defect     last assessed 3/3/15, resolved 3/3/15    Endometrial polyp      Past Surgical History:   Procedure Laterality Date    CARDIAC SURGERY      valve repair      SECTION      x 2    ENDOMETRIAL BIOPSY      by suction     FOOT SURGERY Left     INSERTION OF INTRAUTERINE DEVICE (IUD)      REMOVAL OF INTRAUTERINE DEVICE (IUD)       Social History   Social History     Substance and Sexual Activity   Alcohol Use No     Social History     Substance and Sexual Activity   Drug Use No     Social History     Tobacco Use   Smoking Status Never Smoker   Smokeless Tobacco Never Used     Family History:   Family History   Problem Relation Age of Onset    Heart disease Mother     No Known Problems Father     Heart disease Maternal Grandmother     Coronary artery disease Family     No Known Problems Sister     No Known Problems Maternal Grandfather     No Known Problems Paternal Grandmother     No Known Problems Paternal Grandfather     No Known Problems Sister     No Known Problems Maternal Aunt     No Known Problems Maternal Aunt     No Known Problems Maternal Aunt     No Known Problems Maternal Aunt     No Known Problems Paternal Aunt     No Known Problems Paternal Aunt     No Known Problems Paternal Aunt     No Known Problems Paternal Aunt        Review of Systems:  Review of Systems   All other systems reviewed and are negative  Scheduled Meds:  Continuous Infusions:No current facility-administered medications for this encounter  PRN Meds:   all current active meds have been reviewed    No Known Allergies    Objective   Vitals: Temp (24hrs), Av 8 °F (36 6 °C), Min:97 8 °F (36 6 °C), Max:97 8 °F (36 6 °C)  Current: Temperature: 97 8 °F (36 6 °C)  Patient Vitals for the past 24 hrs:   BP Temp Temp src Pulse Resp SpO2   22 1330 155/74 -- -- 64 19 97 %   22 1231 (!) 181/84 -- -- 71 18 97 %   22 1130 (!) 206/88 -- -- 68 -- 97 %   22 1047 (!) 191/87 -- -- 72 18 96 %   22 1000 155/74 -- -- 64 -- 97 %   22 0945 158/72 -- -- 68 -- 97 %   22 0930 (!) 185/84 -- -- 68 -- 96 %   22 0915 (!) 207/91 -- -- 78 18 97 %   22 0909 (!) 207/91 -- -- 72 18 97 %   22 0831 (!) 213/100 97 8 °F (36 6 °C) Oral 87 17 96 %    There is no height or weight on file to calculate BMI  Orthostatic Blood Pressures      Most Recent Value   Blood Pressure 155/74 filed at 2022 1330   Patient Position - Orthostatic VS Lying filed at 2022 1330              Invasive Devices  Report    Peripheral Intravenous Line            Peripheral IV 22 Right Forearm <1 day                Physical Exam:    General:  AO x3, no acute distress  Cardiac:  S1-S2 normal  No murmurs, rubs or gallops, JVP: not elevated  Lungs:  Clear to auscultation bilaterally, no wheezing or crackles    Abdomen:  Soft nontender nondistended, positive bowel sounds  Extremities:  Warm, well perfused, pulses palpable, no ulcers or rashes, no pedal edema  Neuro: Grossly nonfocal  Psych:  Normal affect      Lab Results:   Results from last 7 days   Lab Units 22  0915   WBC Thousand/uL 8 27   HEMOGLOBIN g/dL 14 5   HEMATOCRIT % 42 2   PLATELETS Thousands/uL 286   NEUTROS PCT % 66   MONOS PCT % 7      Results from last 7 days   Lab Units 22  0915   SODIUM mmol/L 139   POTASSIUM mmol/L 3 6   CHLORIDE mmol/L 108   CO2 mmol/L 23   BUN mg/dL 21   CREATININE mg/dL 0 63   CALCIUM mg/dL 9 9   ALK PHOS U/L 75   ALT U/L 29   AST U/L 19   EGFR ml/min/1 73sq m 103                 No results found for: PHART, KPJ9OXA, PO2ART, SWB6VVT, J7POMTWJ, BEART, SOURCE  No components found for: HIV1X2  No results found for: HAV, HEPAIGM, HEPBIGM, HEPBCAB, HBEAG, HEPCAB  No results found for: SPEP, UPEP   Lab Results   Component Value Date    HGBA1C 6 0 (H) 12/04/2021    HGBA1C 5 7 (H) 05/28/2021     Lab Results   Component Value Date    CHOL 201 05/12/2015    CHOL 203 03/11/2015      Lab Results   Component Value Date    HDL 46 (L) 12/04/2021    HDL 43 05/03/2021    HDL 35 (L) 07/09/2019      Lab Results   Component Value Date    LDLCALC 177 (H) 12/04/2021    LDLCALC 147 (H) 05/03/2021    LDLCALC 168 (H) 07/09/2019      Lab Results   Component Value Date    TRIG 106 12/04/2021    TRIG 178 (H) 05/03/2021    TRIG 259 (H) 07/09/2019     No components found for: PROCAL          Imaging: I have personally reviewed pertinent reports

## 2022-02-02 NOTE — ED PROVIDER NOTES
History  Chief Complaint   Patient presents with    Chest Pain     Pt  reports stabbing chest pain starting this morning towards the left side of her chest  Also reports higher blood pressures this past week  46yo female who presents to ER for evaluation of chest pain  Onset 2 hours ago  Worse with walking and associated with sob  Described as sharp stabbing being pulled in and out  States for the past week her BP has been high  She is taking her lisinopril as directed  Admits to pain in back of head and left arm pain with tingling  Denies syncope  Denies abdominal pain, n/v/d  Denies cough or fever  Denies vision change  20 years ago had heart surgery secondary to a murmur  Denies h/o heart attack  History provided by:  Patient  Chest Pain  Pain quality: sharp    Pain radiates to the back: no    Pain severity:  Moderate  Timing:  Constant  Chronicity:  New  Associated symptoms: headache and shortness of breath    Associated symptoms: no back pain, no fever and not vomiting        Prior to Admission Medications   Prescriptions Last Dose Informant Patient Reported?  Taking?   aspirin (ECOTRIN LOW STRENGTH) 81 mg EC tablet  Self Yes No   Sig: Take 81 mg by mouth daily   bisacodyl (DULCOLAX) 5 mg EC tablet   No No   Sig: Take as directed as per written office instructions   cyclobenzaprine (FLEXERIL) 10 mg tablet   No No   Sig: TAKE 1 TABLET BY MOUTH TWICE A DAY AS NEEDED FOR MUSCLE SPASMS   diclofenac sodium (VOLTAREN) 1 %  Self Yes No   Sig: PLEASE SEE ATTACHED FOR DETAILED DIRECTIONS   ergocalciferol (VITAMIN D2) 50,000 units  Self No No   Sig: Take 1 capsule (50,000 Units total) by mouth once a week   levothyroxine 25 mcg tablet  Self No No   Sig: Take 1 tablet (25 mcg total) by mouth daily   lisinopril (ZESTRIL) 20 mg tablet   No No   Sig: Take 1 tablet (20 mg total) by mouth daily   omega-3-acid ethyl esters (LOVAZA) 1 g capsule   No No   Sig: Take 2 capsules (2 g total) by mouth 2 (two) times a day polyethylene glycol (GLYCOLAX) 17 GM/SCOOP powder   No No   Sig: Take as directed as per written office instructions   pravastatin (PRAVACHOL) 40 mg tablet   No No   Sig: Take 1 tablet (40 mg total) by mouth daily      Facility-Administered Medications: None       Past Medical History:   Diagnosis Date    Cardiac disorder     Congenital heart defect     last assessed 3/3/15, resolved 3/3/15    Endometrial polyp        Past Surgical History:   Procedure Laterality Date    CARDIAC SURGERY      valve repair      SECTION      x 2    ENDOMETRIAL BIOPSY      by suction     FOOT SURGERY Left     INSERTION OF INTRAUTERINE DEVICE (IUD)      REMOVAL OF INTRAUTERINE DEVICE (IUD)         Family History   Problem Relation Age of Onset    Heart disease Mother     No Known Problems Father     Heart disease Maternal Grandmother     Coronary artery disease Family     No Known Problems Sister     No Known Problems Maternal Grandfather     No Known Problems Paternal Grandmother     No Known Problems Paternal Grandfather     No Known Problems Sister     No Known Problems Maternal Aunt     No Known Problems Maternal Aunt     No Known Problems Maternal Aunt     No Known Problems Maternal Aunt     No Known Problems Paternal Aunt     No Known Problems Paternal Aunt     No Known Problems Paternal Aunt     No Known Problems Paternal Aunt      I have reviewed and agree with the history as documented  E-Cigarette/Vaping    E-Cigarette Use Never User      E-Cigarette/Vaping Substances    Nicotine No     THC No     CBD No     Flavoring No     Other No     Unknown No      Social History     Tobacco Use    Smoking status: Never Smoker    Smokeless tobacco: Never Used   Vaping Use    Vaping Use: Never used   Substance Use Topics    Alcohol use: No    Drug use: No       Review of Systems   Constitutional: Negative for chills and fever  HENT: Negative for congestion and sore throat      Eyes: Negative for visual disturbance  Respiratory: Positive for shortness of breath  Cardiovascular: Positive for chest pain  Gastrointestinal: Negative for diarrhea and vomiting  Genitourinary: Negative for difficulty urinating  Musculoskeletal: Negative for back pain and neck pain  Skin: Negative for rash  Neurological: Positive for headaches  Negative for syncope  Psychiatric/Behavioral: Negative for confusion  All other systems reviewed and are negative  Physical Exam  Physical Exam  Vitals and nursing note reviewed  Constitutional:       Appearance: Normal appearance  She is well-developed  HENT:      Head: Normocephalic and atraumatic  Right Ear: External ear normal       Left Ear: External ear normal       Mouth/Throat:      Mouth: Mucous membranes are moist       Pharynx: No oropharyngeal exudate or posterior oropharyngeal erythema  Eyes:      General: No scleral icterus  Extraocular Movements: Extraocular movements intact  Conjunctiva/sclera: Conjunctivae normal       Pupils: Pupils are equal, round, and reactive to light  Cardiovascular:      Rate and Rhythm: Normal rate and regular rhythm  Pulses: Normal pulses  Heart sounds: Normal heart sounds  Pulmonary:      Effort: Pulmonary effort is normal       Breath sounds: Normal breath sounds  Chest:      Chest wall: No tenderness  Abdominal:      General: Bowel sounds are normal  There is no distension  Palpations: Abdomen is soft  Tenderness: There is no abdominal tenderness  Musculoskeletal:         General: Normal range of motion  Cervical back: Neck supple  Right lower leg: No edema  Left lower leg: No edema  Skin:     General: Skin is warm and dry  Capillary Refill: Capillary refill takes less than 2 seconds  Findings: No rash  Neurological:      Mental Status: She is alert and oriented to person, place, and time        Cranial Nerves: No cranial nerve deficit     Psychiatric:         Mood and Affect: Mood normal          Vital Signs  ED Triage Vitals   Temperature Pulse Respirations Blood Pressure SpO2   02/02/22 0831 02/02/22 0831 02/02/22 0831 02/02/22 0831 02/02/22 0831   97 8 °F (36 6 °C) 87 17 (!) 213/100 96 %      Temp Source Heart Rate Source Patient Position - Orthostatic VS BP Location FiO2 (%)   02/02/22 0831 02/02/22 0831 02/02/22 0831 02/02/22 0831 --   Oral Monitor Sitting Right arm       Pain Score       02/02/22 1047       8           Vitals:    02/02/22 1047 02/02/22 1130 02/02/22 1231 02/02/22 1330   BP: (!) 191/87 (!) 206/88 (!) 181/84 155/74   Pulse: 72 68 71 64   Patient Position - Orthostatic VS:  Lying  Lying         Visual Acuity      ED Medications  Medications   Labetalol HCl (NORMODYNE) injection 10 mg (10 mg Intravenous Given 2/2/22 0913)   morphine injection 2 mg (2 mg Intravenous Given 2/2/22 1125)   ondansetron (ZOFRAN) injection 4 mg (4 mg Intravenous Given 2/2/22 1125)       Diagnostic Studies  Results Reviewed     Procedure Component Value Units Date/Time    HS Troponin I 4hr [[id number]]  (Normal) Collected: 02/02/22 1345    Lab Status: Final result Specimen: Blood from Arm, Right Updated: 02/02/22 1419     hs TnI 4hr 4 ng/L      Delta 4hr hsTnI 0 ng/L     HS Troponin I 2hr [[id number]]  (Normal) Collected: 02/02/22 1125    Lab Status: Final result Specimen: Blood from Arm, Right Updated: 02/02/22 1248     hs TnI 2hr 3 ng/L      Delta 2hr hsTnI -1 ng/L     HS Troponin 0hr (reflex protocol) [[id number]]  (Normal) Collected: 02/02/22 0915    Lab Status: Final result Specimen: Blood from Arm, Right Updated: 02/02/22 1008     hs TnI 0hr 4 ng/L     TSH [[id number]]  (Normal) Collected: 02/02/22 0915    Lab Status: Final result Specimen: Blood from Arm, Right Updated: 02/02/22 1001     TSH [address] [place] [address] 2 910 uIU/mL     Narrative:      Patients undergoing fluorescein dye angiography may retain small amounts of fluorescein in the body for 48-72 hours post procedure  Samples containing fluorescein can produce falsely depressed TSH values  If the patient had this procedure,a specimen should be resubmitted post fluorescein clearance        Comprehensive metabolic panel [[id number]]  (Abnormal) Collected: 02/02/22 0915    Lab Status: Final result Specimen: Blood from Arm, Right Updated: 02/02/22 0954     Sodium 139 mmol/L      Potassium 3 6 mmol/L      Chloride 108 mmol/L      CO2 23 mmol/L      ANION GAP 8 mmol/L      BUN 21 mg/dL      Creatinine 0 63 mg/dL      Glucose 104 mg/dL      Calcium 9 9 mg/dL      AST 19 U/L      ALT 29 U/L      Alkaline Phosphatase 75 U/L      Total Protein 8 6 g/dL      Albumin 4 1 g/dL      Total Bilirubin 0 28 mg/dL      eGFR 103 ml/min/1 73sq m     Narrative:      National Kidney Disease Foundation guidelines for Chronic Kidney Disease (CKD):     Stage 1 with normal or high GFR (GFR > 90 mL/min/1 73 square meters)    Stage 2 Mild CKD (GFR = 60-89 mL/min/1 73 square meters)    Stage 3A Moderate CKD (GFR = 45-59 mL/min/1 73 square meters)    Stage 3B Moderate CKD (GFR = 30-44 mL/min/1 73 square meters)    Stage 4 Severe CKD (GFR = 15-29 mL/min/1 73 square meters)    Stage 5 End Stage CKD (GFR <15 mL/min/1 73 square meters)  Note: GFR calculation is accurate only with a steady state creatinine    CBC and differential [[id number]]  (Abnormal) Collected: 02/02/22 0915    Lab Status: Final result Specimen: Blood from Arm, Right Updated: 02/02/22 0925     WBC 8 27 Thousand/uL      RBC 5 17 Million/uL      Hemoglobin 14 5 g/dL      Hematocrit 42 2 %      MCV 82 fL      MCH 28 0 pg      MCHC 34 4 g/dL      RDW 12 3 %      MPV 10 9 fL      Platelets 177 Thousands/uL      nRBC 0 /100 WBCs      Neutrophils Relative 66 %      Immat GRANS % 0 %      Lymphocytes Relative 24 %      Monocytes Relative 7 %      Eosinophils Relative 2 %      Basophils Relative 1 %      Neutrophils Absolute 5 55 Thousands/µL      Immature Grans Absolute 0 03 Thousand/uL      Lymphocytes Absolute 1 95 Thousands/µL      Monocytes Absolute 0 54 Thousand/µL      Eosinophils Absolute 0 16 Thousand/µL      Basophils Absolute 0 04 Thousands/µL                  CT head without contrast   Final Result by Allen Anderson MD (02/02 1323)      No acute intracranial abnormality  Workstation performed: [id number]         XR chest 2 views   Final Result by Dalton Cardona MD ([id number])      No acute cardiopulmonary disease  Workstation performed: [id number]                    Procedures  ECG 12 Lead Documentation Only    Date/Time: 2/2/2022 9:29 AM  Performed by: Maddie Marsh PA-C  Authorized by: Maddie Marsh PA-C     Indications / Diagnosis:  Chest pain  ECG reviewed by me, the ED Provider: yes    Patient location:  ED  Previous ECG:     Previous ECG:  Compared to current    Similarity:  No change  Interpretation:     Interpretation: abnormal    Rate:     ECG rate:  79    ECG rate assessment: normal    Rhythm:     Rhythm: sinus rhythm    Ectopy:     Ectopy: none    QRS:     QRS axis:  Normal  Conduction:     Conduction: abnormal      Abnormal conduction: incomplete RBBB    ST segments:     ST segments:  Normal  T waves:     T waves: normal               ED Course  ED Course as of 02/02/22 1450   Wed Feb 02, 2022   0957 Reviewed current results with patient, blood pressure improving, no change in sx's, she declined pain medication, a/w further results   1223 Chest pain still present, states headache is worse and now all throughout her head - will CT   A/w serial troponin   1402 Slim paged for admission             HEART Risk Score      Most Recent Value   Heart Score Risk Calculator    History 1 Filed at: 02/02/2022 1040   ECG 0 Filed at: 02/02/2022 1040   Age 1 Filed at: 02/02/2022 1040   Risk Factors 1 Filed at: 02/02/2022 1040   Troponin 0 Filed at: 02/02/2022 1040   HEART Score 3 Filed at: 02/02/2022 1040                        SBIRT 22yo+      Most Recent Value   SBIRT (24 yo +)    In order to provide better care to our patients, we are screening all of our patients for alcohol and drug use  Would it be okay to ask you these screening questions? No Filed at: 02/02/2022 0846                    MDM  Number of Diagnoses or Management Options  Chest pain: new and requires workup  Hypertensive urgency: new and requires workup     Amount and/or Complexity of Data Reviewed  Clinical lab tests: ordered and reviewed  Tests in the radiology section of CPT®: ordered and reviewed  Review and summarize past medical records: yes  Discuss the patient with other providers: yes    Risk of Complications, Morbidity, and/or Mortality  Presenting problems: moderate  Diagnostic procedures: moderate  Management options: moderate  General comments: Differential diagnosis includes but is not limited to: ACS, uncontrolled htn, angina, ICH    Patient Progress  Patient progress: stable      Disposition  Final diagnoses:   Chest pain   Hypertensive urgency     Time reflects when diagnosis was documented in both MDM as applicable and the Disposition within this note     Time User Action Codes Description Comment    2/2/2022  2:04 PM Iraj Louise Add [R07 9] Chest pain     2/2/2022  2:04 PM Iraj Louise Add [I16 0] Hypertensive urgency       ED Disposition     ED Disposition Condition Date/Time Comment    Admit Stable Wed Feb 2, 2022  2:07 PM Case was discussed with Mateo Gandhi and the patient's admission status was agreed to be Admission Status: inpatient status to the service of Dr Mateo Gandhi  Follow-up Information    None         Patient's Medications   Discharge Prescriptions    No medications on file       No discharge procedures on file      PDMP Review     None          ED Provider  Electronically Signed by           Pito Mcintosh PA-C  02/02/22 4726

## 2022-02-02 NOTE — ED NOTES
Per Vascular, pt to be NPO at midnight for renal artery study tomorrow  Message sent to admitting to request diet order change        Rosalee Dalal RN  02/02/22 8034

## 2022-02-03 ENCOUNTER — APPOINTMENT (INPATIENT)
Dept: NON INVASIVE DIAGNOSTICS | Facility: HOSPITAL | Age: 52
DRG: 305 | End: 2022-02-03
Payer: COMMERCIAL

## 2022-02-03 ENCOUNTER — APPOINTMENT (INPATIENT)
Dept: RADIOLOGY | Facility: HOSPITAL | Age: 52
DRG: 305 | End: 2022-02-03
Payer: COMMERCIAL

## 2022-02-03 LAB
ANION GAP SERPL CALCULATED.3IONS-SCNC: 5 MMOL/L (ref 4–13)
BUN SERPL-MCNC: 21 MG/DL (ref 5–25)
CALCIUM SERPL-MCNC: 9.4 MG/DL (ref 8.3–10.1)
CHLORIDE SERPL-SCNC: 106 MMOL/L (ref 100–108)
CHOLEST SERPL-MCNC: 249 MG/DL
CO2 SERPL-SCNC: 26 MMOL/L (ref 21–32)
CORTIS AM PEAK SERPL-MCNC: 6.2 UG/DL (ref 4.2–22.4)
CREAT SERPL-MCNC: 0.68 MG/DL (ref 0.6–1.3)
ERYTHROCYTE [DISTWIDTH] IN BLOOD BY AUTOMATED COUNT: 12.4 % (ref 11.6–15.1)
GFR SERPL CREATININE-BSD FRML MDRD: 100 ML/MIN/1.73SQ M
GLUCOSE SERPL-MCNC: 113 MG/DL (ref 65–140)
HCT VFR BLD AUTO: 42.8 % (ref 34.8–46.1)
HDLC SERPL-MCNC: 45 MG/DL
HGB BLD-MCNC: 14.2 G/DL (ref 11.5–15.4)
LDLC SERPL CALC-MCNC: 168 MG/DL (ref 0–100)
MAGNESIUM SERPL-MCNC: 2.3 MG/DL (ref 1.6–2.6)
MAX HR PERCENT: 69 %
MAX HR: 143 BPM
MCH RBC QN AUTO: 27.6 PG (ref 26.8–34.3)
MCHC RBC AUTO-ENTMCNC: 33.2 G/DL (ref 31.4–37.4)
MCV RBC AUTO: 83 FL (ref 82–98)
NONHDLC SERPL-MCNC: 204 MG/DL
NUC STRESS EJECTION FRACTION: 63 %
PLATELET # BLD AUTO: 273 THOUSANDS/UL (ref 149–390)
PMV BLD AUTO: 10.8 FL (ref 8.9–12.7)
POTASSIUM SERPL-SCNC: 3.9 MMOL/L (ref 3.5–5.3)
RATE PRESSURE PRODUCT: NORMAL
RBC # BLD AUTO: 5.15 MILLION/UL (ref 3.81–5.12)
SL CV REST NUCLEAR ISOTOPE DOSE: 11 MCI
SL CV STRESS NUCLEAR ISOTOPE DOSE: 32.9 MCI
SL CV STRESS RECOVERY BP: NORMAL MMHG
SL CV STRESS RECOVERY HR: 87 BPM
SL CV STRESS RECOVERY O2 SAT: 96 %
SL CV STRESS STAGE REACHED: 3
SODIUM SERPL-SCNC: 137 MMOL/L (ref 136–145)
STRESS ANGINA INDEX: 1
STRESS BASELINE BP: NORMAL MMHG
STRESS BASELINE HR: 72 BPM
STRESS O2 SAT REST: 98 %
STRESS PEAK HR: 116 BPM
STRESS PERCENT HR: 69 %
STRESS POST ESTIMATED WORKLOAD: 8.3 METS
STRESS POST EXERCISE DUR MIN: 6 MIN
STRESS POST EXERCISE DUR SEC: 30 SEC
STRESS POST O2 SAT PEAK: 98 %
STRESS POST PEAK BP: 132 MMHG
STRESS TARGET HR: 116 BPM
STRESS/REST PERFUSION RATIO: 0.88
TRIGL SERPL-MCNC: 180 MG/DL
WBC # BLD AUTO: 6.75 THOUSAND/UL (ref 4.31–10.16)

## 2022-02-03 PROCEDURE — 93975 VASCULAR STUDY: CPT | Performed by: SURGERY

## 2022-02-03 PROCEDURE — 84244 ASSAY OF RENIN: CPT | Performed by: INTERNAL MEDICINE

## 2022-02-03 PROCEDURE — 80048 BASIC METABOLIC PNL TOTAL CA: CPT | Performed by: INTERNAL MEDICINE

## 2022-02-03 PROCEDURE — 83735 ASSAY OF MAGNESIUM: CPT | Performed by: INTERNAL MEDICINE

## 2022-02-03 PROCEDURE — 80061 LIPID PANEL: CPT | Performed by: INTERNAL MEDICINE

## 2022-02-03 PROCEDURE — 93016 CV STRESS TEST SUPVJ ONLY: CPT | Performed by: INTERNAL MEDICINE

## 2022-02-03 PROCEDURE — 85027 COMPLETE CBC AUTOMATED: CPT | Performed by: INTERNAL MEDICINE

## 2022-02-03 PROCEDURE — 93018 CV STRESS TEST I&R ONLY: CPT | Performed by: INTERNAL MEDICINE

## 2022-02-03 PROCEDURE — 82088 ASSAY OF ALDOSTERONE: CPT | Performed by: INTERNAL MEDICINE

## 2022-02-03 PROCEDURE — 82533 TOTAL CORTISOL: CPT | Performed by: INTERNAL MEDICINE

## 2022-02-03 PROCEDURE — 78452 HT MUSCLE IMAGE SPECT MULT: CPT | Performed by: INTERNAL MEDICINE

## 2022-02-03 PROCEDURE — 93017 CV STRESS TEST TRACING ONLY: CPT

## 2022-02-03 PROCEDURE — A9502 TC99M TETROFOSMIN: HCPCS

## 2022-02-03 PROCEDURE — 78452 HT MUSCLE IMAGE SPECT MULT: CPT

## 2022-02-03 PROCEDURE — 93975 VASCULAR STUDY: CPT

## 2022-02-03 PROCEDURE — 99232 SBSQ HOSP IP/OBS MODERATE 35: CPT | Performed by: INTERNAL MEDICINE

## 2022-02-03 RX ORDER — ATORVASTATIN CALCIUM 40 MG/1
40 TABLET, FILM COATED ORAL
Status: DISCONTINUED | OUTPATIENT
Start: 2022-02-03 | End: 2022-02-04 | Stop reason: HOSPADM

## 2022-02-03 RX ADMIN — REGADENOSON 0.4 MG: 0.08 INJECTION, SOLUTION INTRAVENOUS at 15:12

## 2022-02-03 RX ADMIN — LISINOPRIL 20 MG: 20 TABLET ORAL at 08:56

## 2022-02-03 RX ADMIN — ATORVASTATIN CALCIUM 40 MG: 40 TABLET, FILM COATED ORAL at 16:25

## 2022-02-03 RX ADMIN — HEPARIN SODIUM 5000 UNITS: 5000 INJECTION INTRAVENOUS; SUBCUTANEOUS at 13:33

## 2022-02-03 RX ADMIN — HEPARIN SODIUM 5000 UNITS: 5000 INJECTION INTRAVENOUS; SUBCUTANEOUS at 21:28

## 2022-02-03 RX ADMIN — LABETALOL HYDROCHLORIDE 200 MG: 200 TABLET, FILM COATED ORAL at 08:56

## 2022-02-03 RX ADMIN — LABETALOL HYDROCHLORIDE 200 MG: 200 TABLET, FILM COATED ORAL at 21:28

## 2022-02-03 RX ADMIN — OMEGA-3 FATTY ACIDS CAP 1000 MG 1000 MG: 1000 CAP at 08:56

## 2022-02-03 RX ADMIN — AMLODIPINE BESYLATE 5 MG: 5 TABLET ORAL at 08:56

## 2022-02-03 RX ADMIN — HEPARIN SODIUM 5000 UNITS: 5000 INJECTION INTRAVENOUS; SUBCUTANEOUS at 05:05

## 2022-02-03 RX ADMIN — OMEGA-3 FATTY ACIDS CAP 1000 MG 1000 MG: 1000 CAP at 16:25

## 2022-02-03 RX ADMIN — ASPIRIN 81 MG: 81 TABLET, COATED ORAL at 08:56

## 2022-02-03 NOTE — ASSESSMENT & PLAN NOTE
----- Message from Rossy Baird sent at 8/7/2019 11:28 AM CDT -----  Contact: Mom [phone]  Type:  Needs Medical Advice    Who Called: Mom     Would the patient rather a call back or a response via [facility]? Call Back     Best Call Back Number: [phone]    Additional Information: Mom [phone]---calling to get the pt appt rescheduled for another day. Mom is requesting a call back with advice    · H/o ASD secundum closure at the age of 31 yrs  · F/u echo

## 2022-02-03 NOTE — PROGRESS NOTES
Cardiology Progress note  Unit/Bed#: -01 Encounter: [id number]        Tata Baron 45 y o  female [id number]  Hospital Stay Days: 1    Assessment and Plan      Current Problem List   Principal Problem:    Chest pain  Active Problems:    ASD secundum    Dyslipidemia    IFG (impaired fasting glucose)    Hypertensive urgency    Assessment/Plan:  1  Hypertensive urgency - noted to have blood pressure greater than 584 systolic on arrival  Received one dose of labetalol  No resolution of chest pain with it  Troponin negative X 3  EKG unchanged from baseline  ? Cont  Norvasc  ? Cont  lebatolol 200 mg q 12 hours  ? Renal doppler negative  ? Follow cortisol  2   Chest pain - reported to have chest pain that is constant for 1 day - no evidence of NSTEMI as patient has no EKG changes, and no troponin elevation  ? Went for stress test today - awaiting results - if no ischemic disease can be d/c later   3  History of ASD repair  ·  At age 31 - RV does appear low normal  A small PFO was also noted on color doppler potentially - can be follow up outpatient  ·     Subjective     Patient seen and examined  Reports feelign much better  BP has improved  Minimal chest pain  No acute events overnight      Objective     Vitals: Temp (24hrs), Av 9 °F (36 6 °C), Min:97 6 °F (36 4 °C), Max:98 2 °F (36 8 °C)  Current: Temperature: 97 9 °F (36 6 °C)  Patient Vitals for the past 24 hrs:   BP Temp Temp src Pulse Resp SpO2 Height Weight   22 1116 118/69 97 9 °F (36 6 °C) -- 74 18 94 % -- --   22 0901 147/81 98 2 °F (36 8 °C) -- 69 16 94 % -- --   22 2207 146/80 97 6 °F (36 4 °C) Oral 70 18 96 % -- --   22 1839 144/82 -- -- 75 -- 95 % -- --   22 1715 169/79 -- -- 75 -- -- 5' (1 524 m) 77 1 kg (170 lb)   22 1700 155/71 -- -- 62 -- 97 % -- --   22 1630 169/79 -- -- 70 -- 95 % -- --   22 1628 169/79 -- -- -- -- -- -- --   22 1500 (!) 197/84 -- -- 74 -- 97 % -- --    Body mass index is 33 2 kg/m²  Physical Exam:  GENERAL: NAD  HEENT:  NC/AT,  CARDIAC:  RRR, +S1/S2, no S3/S4 heard, no m/g/r  PULMONARY:  CTA B/L, no wheezing/rales/rhonci, non-labored breathing  ABDOMEN:  Soft, NT/ND,no rebound/guarding/rigidity  Extremities:  No edema, cyanosis, or clubbing  NEUROLOGIC: Grossly intact  SKIN:  No rashes or erythema noted on exposed skin     Psych: Normal affect    Invasive Devices  Report    Peripheral Intravenous Line            Peripheral IV 02/02/22 Right Forearm 1 day                    Labs:   Results from last 7 days   Lab Units 02/03/22  0452 02/02/22  0915   WBC Thousand/uL 6 75 8 27   HEMOGLOBIN g/dL 14 2 14 5   HEMATOCRIT % 42 8 42 2   PLATELETS Thousands/uL 273 286   NEUTROS PCT %  --  66   MONOS PCT %  --  7      Results from last 7 days   Lab Units 02/03/22  0450 02/02/22  0915   SODIUM mmol/L 137 139   POTASSIUM mmol/L 3 9 3 6   CHLORIDE mmol/L 106 108   CO2 mmol/L 26 23   BUN mg/dL 21 21   CREATININE mg/dL 0 68 0 63   CALCIUM mg/dL 9 4 9 9   ALK PHOS U/L  --  75   ALT U/L  --  29   AST U/L  --  19   MAGNESIUM mg/dL 2 3  --    EGFR ml/min/1 73sq m 100 103                 No results found for: PHART, WLC2LNM, PO2ART, GKG1PPH, P9XJPBUH, BEART, SOURCE  No components found for: HIV1X2  No results found for: HAV, HEPAIGM, HEPBIGM, HEPBCAB, HBEAG, HEPCAB  No results found for: SPEP, UPEP   Lab Results   Component Value Date    HGBA1C 6 0 (H) 12/04/2021    HGBA1C 5 7 (H) 05/28/2021     Lab Results   Component Value Date    CHOL 201 05/12/2015    CHOL 203 03/11/2015      Lab Results   Component Value Date    HDL 45 (L) 02/03/2022    HDL 46 (L) 12/04/2021    HDL 43 05/03/2021      Lab Results   Component Value Date    LDLCALC 168 (H) 02/03/2022    LDLCALC 177 (H) 12/04/2021    LDLCALC 147 (H) 05/03/2021      Lab Results   Component Value Date    TRIG 180 (H) 02/03/2022    TRIG 106 12/04/2021    TRIG 178 (H) 05/03/2021     No components found for: PROCAL      Micro: Urinalysis:  No results found for: AMPHETUR, BDZUR, COCAINEUR, OPIATEUR, PCPUR, THCUR, ETOH, ACTMNPHEN, SALICYLATE       Invalid input(s): URIBILINOGEN        Intake and Outputs:  I/O       02/01 0701 02/02 0700 02/02 0701 02/03 0700 02/03 0701 02/04 0700    P  O    220    Total Intake(mL/kg)   220 (2 9)    Net   +220           Unmeasured Urine Occurrence  1 x         Nutrition:  Diet Cardiovascular; Stress Test (1 Meal); No Caffeine, No Chocolate  Radiology Results:   CT head without contrast   Final Result by Nikole Flores MD (02/02 1323)      No acute intracranial abnormality  Workstation performed: [id number]         XR chest 2 views   Final Result by Ryan Sanchez MD (06/48 0674)      No acute cardiopulmonary disease                    Workstation performed: [id number]         VAS renal artery complete    (Results Pending)     Scheduled Medications:  amLODIPine, 5 mg, Daily  aspirin, 81 mg, Daily  atorvastatin, 40 mg, Daily With Dinner  fish oil, 1,000 mg, BID  heparin (porcine), 5,000 Units, Q8H [address]  labetalol, 200 mg, Q12H MORIAH  lisinopril, 20 mg, Daily      PRN MEDS:     Last 24 Hour Meds: :   Medication Administration - last 24 hours from 02/02/2022 1443 to 02/03/2022 1443       Date/Time Order Dose Route Action Action by     02/03/2022 0856 aspirin (ECOTRIN LOW STRENGTH) EC tablet 81 mg 81 mg Oral Given Jessee Roberson RN     02/02/2022 1628 aspirin (ECOTRIN LOW STRENGTH) EC tablet 81 mg 81 mg Oral Given Lianna Dumont RN     02/03/2022 0856 lisinopril (ZESTRIL) tablet 20 mg 20 mg Oral Given Jessee Roberson RN     02/03/2022 9884 fish oil capsule 1,000 mg 1,000 mg Oral Given Jessee Roberson RN     02/02/2022 2107 fish oil capsule 1,000 mg 1,000 mg Oral Given Celi Blackwell RN     02/02/2022 1628 pravastatin (PRAVACHOL) tablet 40 mg 40 mg Oral Given Lianna Dumont RN     02/02/2022 1628 heparin (porcine) subcutaneous injection 5,000 Units 5,000 Units Subcutaneous Given Darrall Plunk Carlota Villanueva RN     02/03/2022 0856 amLODIPine (NORVASC) tablet 5 mg 5 mg Oral Given Jessee Roberson RN     02/02/2022 1628 amLODIPine (NORVASC) tablet 5 mg 5 mg Oral Given Lianna Dumont RN     02/03/2022 0856 labetalol (NORMODYNE) tablet 200 mg 200 mg Oral Given Jessee Roberson RN     02/02/2022 1633 labetalol (NORMODYNE) tablet 200 mg 200 mg Oral Not Given Nickolas Guzman     02/02/2022 1628 Labetalol HCl (NORMODYNE) injection 10 mg 10 mg Intravenous Given Lianna Dumont RN     02/03/2022 1333 heparin (porcine) subcutaneous injection 5,000 Units 5,000 Units Subcutaneous Given Jessee Roberson RN     02/03/2022 0505 heparin (porcine) subcutaneous injection 5,000 Units 5,000 Units Subcutaneous Given Jessee Roberson RN     02/02/2022 2107 heparin (porcine) subcutaneous injection 5,000 Units 5,000 Units Subcutaneous Given Ceil Blackwell RN          PLEASE NOTE:  This encounter was completed utilizing the [employer] Direct Speech Voice Recognition Software  Grammatical errors, random word insertions, pronoun errors and incomplete sentences are occasional consequences of the system due to software limitations, ambient noise and hardware issues  These may be missed by proof reading prior to affixing electronic signature  Any questions or concerns about the content, text or information contained within the body of this dictation should be directly addressed to the physician for clarification  Please do not hesitate to call me directly if you have any any questions or concerns

## 2022-02-03 NOTE — ASSESSMENT & PLAN NOTE
· Presented to the ED 2/2 with L sided CP radiating to the L UE, worsening with exertion and associated with SOB  · Has noted elevated BP at home x 1 week and had hypertensive urgency on arrival to the ED with a BP of 213/100  · Trop normal  · EKG - NSR, T inversion in V2 to V4  · Cardiac cath in 2015 normal  · Meds added by cardiology for improved BP control  · Stress test - done today, official read still pending  · Cardiology input appreciated  · Check aldosterone/renin, a m  Cortisol  · Check renal Doppler - negative for ANA MARÍA, however 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    · Reached out to vascular surgery: no current intervention if asymptomatic & celiac/GIOVANNI patent, can follow up in office for incidental finding of SMA stenosis    · Continue escalation of medical therapy, labetalol 200 mg b i d , amlodipine 5 mg daily, and continue home lisinopril 20 mg a day   No clear evidence of volume overload or JVD to suggest absolute need of diuretic at this time

## 2022-02-03 NOTE — ASSESSMENT & PLAN NOTE
· On Lisinopril 20 mg daily, reports inadequate BP control at home     · BP on arrival to the Ed 2/1 was 213/100  · Recommendations per cardiology as above   · Continue to monitor BP, have maintained stable

## 2022-02-03 NOTE — ASSESSMENT & PLAN NOTE
· On Pravastatin 40 mg daily and fish oil at home - continue  · Check FLP - mixed hyperlipidemia, no improvement observed when compared to historical results  · Consider [address] on atorvastatin 40mg

## 2022-02-03 NOTE — H&P
Nicola Pulliam 11 1970, 45 y o  female MRN: [id number]  Unit/Bed#: -01 Encounter: [id number]  Primary Care Provider: HARLEY Krishnamurthy   Date and time admitted to hospital: 2/2/2022  8:24 AM    * Chest pain  Assessment & Plan  · Presented to the ED with left sided chest pain radiating to the left upper extremity since this morning, worse with exertion and associated with shortness of breath  · Has noted elevated BP at home since 1 week and had hypertensive urgency on arrival to the ED with a BP of 213/100  · Trop normal  · EKG - NSR, T inversion in V2 to V4  · Cardiac cath in 2015 normal  · Meds added by cardiology for improved BP control  · If chest pain persists despite BP control plan for stress test  · Cardiology input appreciated    Hypertensive urgency  Assessment & Plan  · On Lisinopril 20 mg daily at home with good BP control  · Since 1 week has noted elevated BP  · BP on arrival to the Ed was 213/100  · Labetalol 200 mg BID, Amlodipine 5 mg daily added to home Lisinopril 20 mg by cardiology and echo, Aldosterone/renin, cortisol and renal artery doppler ordered  · Monitor BP    Dyslipidemia  Assessment & Plan  · On Pravastatin 40 mg daily and fish oil at home - continue  · Check FLP    IFG (impaired fasting glucose)  Assessment & Plan  · HbA1c 6 on 12/4/21  · Ct low calorie diet  · Monitor blood sugars    ASD secundum  Assessment & Plan  · H/o ASD secundum closure at the age of 31 yrs  · F/u echo      VTE Pharmacologic Prophylaxis: VTE Score: 3 Moderate Risk (Score 3-4) - Pharmacological DVT Prophylaxis Ordered: heparin  Code Status: Level 1 - Full Code   Discussion with family: Patient declined call to        Anticipated Length of Stay: Patient will be admitted on an inpatient basis with an anticipated length of stay of greater than 2 midnights secondary to chest pain with hypertensive urgency undergoing workup as above     Total Time for Visit, including Counseling / Coordination of Care: 60 minutes Greater than 50% of this total time spent on direct patient counseling and coordination of care  Chief Complaint: Chest pain    History of Present Illness:  Bria Michel is a 45 y o  female with a PMH of hypertension, dyslipidemia, ASD secundum closure who presents with chest pain  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 when she developed left lower chest pain radiating to the left upper extremity associated with shortness of breath  Severe pain lasted for 5 minutes  Since then she continues to have mild chest pain which is worse with exertion  Her shortness of breath also worsens with exertion  She has noted elevated blood pressure with systolics in the 753'Y since 1 week  On arrival to the ED her BP was 213/100  No visual changes  She has a mild headache  Review of Systems:  Review of Systems   Respiratory: Positive for shortness of breath  Cardiovascular: Positive for chest pain  Left upper extremity pain   Elevated BP   Neurological: Positive for headaches  All other systems reviewed and are negative  Past Medical and Surgical History:   Past Medical History:   Diagnosis Date    Cardiac disorder     Congenital heart defect     last assessed 3/3/15, resolved 3/3/15    Endometrial polyp        Past Surgical History:   Procedure Laterality Date    CARDIAC SURGERY      valve repair      SECTION      x 2    ENDOMETRIAL BIOPSY      by suction     FOOT SURGERY Left     INSERTION OF INTRAUTERINE DEVICE (IUD)      REMOVAL OF INTRAUTERINE DEVICE (IUD)         Meds/Allergies:  Prior to Admission medications    Medication Sig Start Date End Date Taking?  Authorizing Provider   aspirin (ECOTRIN LOW STRENGTH) 81 mg EC tablet Take 81 mg by mouth daily    Historical Provider, MD   bisacodyl (DULCOLAX) 5 mg EC tablet Take as directed as per written office instructions 21 Kylah Rai PA-C   cyclobenzaprine (FLEXERIL) 10 mg tablet TAKE 1 TABLET BY MOUTH TWICE A DAY AS NEEDED FOR MUSCLE SPASMS 12/26/21   HARLEY Matthews   diclofenac sodium (VOLTAREN) 1 % PLEASE SEE ATTACHED FOR DETAILED DIRECTIONS 7/8/19   Historical Provider, MD   ergocalciferol (VITAMIN D2) 50,000 units Take 1 capsule (50,000 Units total) by mouth once a week 8/6/19   Sharmaine Orozco MD   levothyroxine 25 mcg tablet Take 1 tablet (25 mcg total) by mouth daily 9/18/19   Sharmaine Orozco MD   lisinopril (ZESTRIL) 20 mg tablet Take 1 tablet (20 mg total) by mouth daily 12/29/21   HARLEY Matthews   omega-3-acid ethyl esters (LOVAZA) 1 g capsule Take 2 capsules (2 g total) by mouth 2 (two) times a day 12/29/21   HARLEY Matthews   polyethylene glycol [facility]) 17 GM/SCOOP powder Take as directed as per written office instructions 12/27/21   Ezio Rai PA-C   pravastatin (PRAVACHOL) 40 mg tablet Take 1 tablet (40 mg total) by mouth daily 12/29/21   HARLEY Matthews     I have reviewed home medications with patient personally      Allergies: No Known Allergies    Social History:  Marital Status: /Civil Union   Substance Use History:   Social History     Substance and Sexual Activity   Alcohol Use No     Social History     Tobacco Use   Smoking Status Never Smoker   Smokeless Tobacco Never Used     Social History     Substance and Sexual Activity   Drug Use No       Family History:  Family History   Problem Relation Age of Onset    Heart disease Mother     No Known Problems Father     Heart disease Maternal Grandmother     Coronary artery disease Family     No Known Problems Sister     No Known Problems Maternal Grandfather     No Known Problems Paternal Grandmother     No Known Problems Paternal Grandfather     No Known Problems Sister     No Known Problems Maternal Aunt     No Known Problems Maternal Aunt     No Known Problems Maternal Aunt     No Known Problems Maternal Aunt     No Known Problems Paternal Aunt     No Known Problems Paternal Aunt     No Known Problems Paternal Aunt     No Known Problems Paternal Aunt        Physical Exam:     Vitals:   Blood Pressure: 144/82 (02/02/22 1839)  Pulse: 75 (02/02/22 1839)  Temperature: 97 8 °F (36 6 °C) (02/02/22 0831)  Temp Source: Oral (02/02/22 0831)  Respirations: 19 (02/02/22 1330)  Height: 5' (152 4 cm) (02/02/22 1715)  Weight - Scale: 77 1 kg (170 lb) (02/02/22 1715)  SpO2: 95 % (02/02/22 1839)    Physical Exam  HENT:      Head: Normocephalic  Nose: Nose normal       Mouth/Throat:      Mouth: Mucous membranes are moist    Eyes:      Extraocular Movements: Extraocular movements intact  Cardiovascular:      Rate and Rhythm: Normal rate  Pulmonary:      Effort: Pulmonary effort is normal  No respiratory distress  Breath sounds: Normal breath sounds  No wheezing  Abdominal:      General: Bowel sounds are normal  There is no distension  Palpations: Abdomen is soft  Tenderness: There is no abdominal tenderness  Musculoskeletal:         General: No swelling  Cervical back: Neck supple  Skin:     General: Skin is warm and dry  Neurological:      General: No focal deficit present  Mental Status: She is alert and oriented to person, place, and time     Psychiatric:         Mood and Affect: Mood normal          Behavior: Behavior normal           Additional Data:     Lab Results:  Results from last 7 days   Lab Units 02/02/22  0915   WBC Thousand/uL 8 27   HEMOGLOBIN g/dL 14 5   HEMATOCRIT % 42 2   PLATELETS Thousands/uL 286   NEUTROS PCT % 66   LYMPHS PCT % 24   MONOS PCT % 7   EOS PCT % 2     Results from last 7 days   Lab Units 02/02/22  0915   SODIUM mmol/L 139   POTASSIUM mmol/L 3 6   CHLORIDE mmol/L 108   CO2 mmol/L 23   BUN mg/dL 21   CREATININE mg/dL 0 63   ANION GAP mmol/L 8   CALCIUM mg/dL 9 9   ALBUMIN g/dL 4 1   TOTAL BILIRUBIN mg/dL 0 28   ALK PHOS U/L 75   ALT U/L 29   AST U/L 19   GLUCOSE RANDOM mg/dL 104                       Imaging: Reviewed radiology reports from this admission including: chest xray and CT head  CT head without contrast   Final Result by Logan Painter MD (02/02 1323)      No acute intracranial abnormality  Workstation performed: [id number]         XR chest 2 views   Final Result by Domenico Campos MD (73/97 1208)      No acute cardiopulmonary disease  Workstation performed: [id number]         VAS renal artery complete    (Results Pending)       EKG and Other Studies Reviewed on Admission:   · EKG: NSR  Incomplete RBBB  T inversion in V2 to V4     ** Please Note: This note has been constructed using a voice recognition system   **

## 2022-02-03 NOTE — PLAN OF CARE
Problem: Potential for Falls  Goal: Patient will remain free of falls  Description: INTERVENTIONS:  - Educate patient/family on patient safety including physical limitations  - Instruct patient to call for assistance with activity   - Consult OT/PT to assist with strengthening/mobility   - Keep Call bell within reach  - Keep bed low and locked with side rails adjusted as appropriate  - Keep care items and personal belongings within reach  - Initiate and maintain comfort rounds  - Make Fall Risk Sign visible to staff  - Offer Toileting every 2 Hours, in advance of need  - Initiate/Maintain bed alarm  - Obtain necessary fall risk management equipment  - Apply yellow socks and bracelet for high fall risk patients  - Consider moving patient to room near nurses station  Outcome: Progressing     Problem: CARDIOVASCULAR - ADULT  Goal: Maintains optimal cardiac output and hemodynamic stability  Description: INTERVENTIONS:  - Monitor I/O, vital signs and rhythm  - Monitor for S/S and trends of decreased cardiac output  - Administer and titrate ordered vasoactive medications to optimize hemodynamic stability  - Assess quality of pulses, skin color and temperature  - Assess for signs of decreased coronary artery perfusion  - Instruct patient to report change in severity of symptoms  Outcome: Progressing  Goal: Absence of cardiac dysrhythmias or at baseline rhythm  Description: INTERVENTIONS:  - Continuous cardiac monitoring, vital signs, obtain 12 lead EKG if ordered  - Administer antiarrhythmic and heart rate control medications as ordered  - Monitor electrolytes and administer replacement therapy as ordered  Outcome: Progressing

## 2022-02-03 NOTE — ASSESSMENT & PLAN NOTE
· Presented to the ED with left sided chest pain radiating to the left upper extremity since this morning, worse with exertion and associated with shortness of breath  · Has noted elevated BP at home since 1 week and had hypertensive urgency on arrival to the ED with a BP of 213/100  · Trop normal  · EKG - NSR, T inversion in V2 to V4  · Cardiac cath in 2015 normal  · Meds added by cardiology for improved BP control     · If chest pain persists despite BP control plan for stress test  · Cardiology input appreciated

## 2022-02-03 NOTE — ASSESSMENT & PLAN NOTE
· On Lisinopril 20 mg daily at home with good BP control  · Since 1 week has noted elevated BP  · BP on arrival to the Ed was 213/100  · Labetalol 200 mg BID, Amlodipine 5 mg daily added to home Lisinopril 20 mg by cardiology and echo, Aldosterone/renin, cortisol and renal artery doppler ordered  · Monitor BP

## 2022-02-03 NOTE — UTILIZATION REVIEW
Initial Clinical Review    Admission: Date/Time/Statement:   Admission Orders (From admission, onward)     Ordered        02/02/22 1408  INPATIENT ADMISSION  Once                      Orders Placed This Encounter   Procedures    INPATIENT ADMISSION     Standing Status:   Standing     Number of Occurrences:   1     Order Specific Question:   Level of Care     Answer:   Med Surg [16]     Order Specific Question:   Estimated length of stay     Answer:   More than 2 Midnights     Order Specific Question:   Certification     Answer:   I certify that inpatient services are medically necessary for this patient for a duration of greater than two midnights  See H&P and MD Progress Notes for additional information about the patient's course of treatment  ED Arrival Information     Expected Arrival Acuity    - 2/2/2022 08:21 Urgent         Means of arrival Escorted by Service Admission type    Walk-In Self Hospitalist Urgent         Arrival complaint    Chest/arm pain        Chief Complaint   Patient presents with    Chest Pain     Pt  reports stabbing chest pain starting this morning towards the left side of her chest  Also reports higher blood pressures this past week  Initial Presentation: 45 y o  female with a PMH of hypertension, dyslipidemia, ASD secundum closure present to the ED from home with chest pain  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 at around 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 when she developed left lower chest pain radiating to the left upper extremity associated with shortness of breath  Severe pain lasted for 5 minutes  Since then she continues to have mild chest pain and SOB which is worse with exertion  She has noted elevated blood pressure with systolics in the 574'Q x 1 week  On arrival to the ED her BP was 213/100  Reports mild headache  IV Labatalol, IV Zofran, IV Morphine, 81 mg po ASA given      Plan: Inpatient admission for evaluation and treatment of chest pain, hypertensive urgency: cardiology consulted, Labetalol 200 mg BID, Amlodipine 5 mg daily, cont home Lisinopril 20 mg  echo, Aldosterone/renin, cortisol and renal artery doppler ordered  Monitor BP  If chest pain persists despite BP control plan for stress test    02/02 Cardiology Consult:  Hypertensive urgency and chest pain: Troponin negative X 3  EKG unchanged from baseline  Would initiate labetalol for blood pressure lowering - if does not drop by 25% map would start IV drip  Restart patients home lisinopril  Would start on additional agent with norvasc 5 mg  Also would start lebatolol 200 mg q 12 hours  Will reassess once bp is lowered - concerning for typical angina - if no improvement would pursue stress test  Cont to monitor on tele  Stress test today, awaiting results  PE: RRR, +S1/S2, no S3/S4 heard, no m/g/r    Date: 02/03   Day 2:   Cardiology Notes: No acute events overnight  Pt reports feeling much better  BP has improved  Minimal chest pain  Cont  Norvasc, Cont  lebatolol 200 mg q 12 hours  Renal doppler negative  Follow cortisol      ED Triage Vitals   Temperature Pulse Respirations Blood Pressure SpO2   02/02/22 0831 02/02/22 0831 02/02/22 0831 02/02/22 0831 02/02/22 0831   97 8 °F (36 6 °C) 87 17 (!) 213/100 96 %      Temp Source Heart Rate Source Patient Position - Orthostatic VS BP Location FiO2 (%)   02/02/22 0831 02/02/22 0831 02/02/22 0831 02/02/22 0831 --   Oral Monitor Sitting Right arm       Pain Score       02/02/22 1047       8          Wt Readings from Last 1 Encounters:   02/02/22 77 1 kg (170 lb)     Additional Vital Signs:   Date/Time Temp Pulse Resp BP MAP (mmHg) SpO2 O2 Device Patient Position - Orthostatic VS   02/03/22 09:01:33 98 2 °F (36 8 °C) 69 16 147/81 103 94 % -- --   02/02/22 22:07:21 97 6 °F (36 4 °C) 70 18 146/80 102 96 % None (Room air) Lying   02/02/22 18:39:28 -- 75 -- 144/82 103 95 % -- --   02/02/22 1715 -- 75 -- 169/79 -- -- -- --   02/02/22 1700 -- 62 -- 155/71 101 97 % None (Room air) Lying   02/02/22 1630 -- 70 -- 169/79 113 95 % None (Room air) Lying   02/02/22 1628 -- -- -- 169/79 -- -- -- --   02/02/22 1500 -- 74 -- 197/84 Abnormal  121 97 % None (Room air) Lying   02/02/22 1330 -- 64 19 155/74 107 97 % None (Room air) Lying   02/02/22 1231 -- 71 18 181/84 Abnormal  -- 97 % None (Room air) --   02/02/22 1130 -- 68 -- 206/88 Abnormal  126 97 % None (Room air) Lying   02/02/22 1047 -- 72 18 191/87 Abnormal  -- 96 % None (Room air) --   02/02/22 1000 -- 64 -- 155/74 106 97 % None (Room air) Lying   02/02/22 0945 -- 68 -- 158/72 103 97 % None (Room air) Lying   02/02/22 0930 -- 68 -- 185/84 Abnormal  121 96 % None (Room air) Lying   02/02/22 0922 -- -- -- -- -- -- None (Room air) --   02/02/22 0915 -- 78 18 207/91 Abnormal  130 97 % None (Room air) Lying   02/02/22 0909 -- 72 18 207/91 Abnormal  -- 97 % None (Room air) --       Pertinent Labs/Diagnostic Test Results:   02/02  CXR: No acute cardiopulmonary disease  CT head: No acute intracranial abnormality    EKG result: NSR  ECHO:   Left Ventricle: Left ventricular cavity size is normal  Wall thickness is mildly increased  The left ventricular ejection fraction is 60%  Systolic function is normal  Wall motion cannot be accurately assessed  Diastolic function is normal     Right Ventricle: Systolic function is low normal  Abnormal tricuspid annular plane systolic excursion (TAPSE) < 1 7 cm    Atrial Septum: A small patent foramen ovale could not be excluded on this study on color doppler  Consider agitated saline study to rule out shunt at the atrial level  Scottie Boxer  Aortic Valve: There is no evidence of stenosis    Tricuspid Valve: There is mild regurgitation    Technically difficult study with poor image quality          Results from last 7 days   Lab Units 02/03/22  0452 02/02/22  0915   WBC Thousand/uL 6 75 8 27   HEMOGLOBIN g/dL 14 2 14 5   HEMATOCRIT % 42 8 42 2   PLATELETS Thousands/uL 273 286   NEUTROS ABS Thousands/µL  --  5 55         Results from last 7 days   Lab Units 02/03/22  0450 02/02/22  0915   SODIUM mmol/L 137 139   POTASSIUM mmol/L 3 9 3 6   CHLORIDE mmol/L 106 108   CO2 mmol/L 26 23   ANION GAP mmol/L 5 8   BUN mg/dL 21 21   CREATININE mg/dL 0 68 0 63   EGFR ml/min/1 73sq m 100 103   CALCIUM mg/dL 9 4 9 9   MAGNESIUM mg/dL 2 3  --      Results from last 7 days   Lab Units 02/02/22  0915   AST U/L 19   ALT U/L 29   ALK PHOS U/L 75   TOTAL PROTEIN g/dL 8 6*   ALBUMIN g/dL 4 1   TOTAL BILIRUBIN mg/dL 0 28         Results from last 7 days   Lab Units 02/03/22  0450 02/02/22  0915   GLUCOSE RANDOM mg/dL 113 104     Results from last 7 days   Lab Units 02/02/22  1345 02/02/22  1125 02/02/22  0915   HS TNI 0HR ng/L  --   --  4   HS TNI 2HR ng/L  --  3  --    HSTNI D2 ng/L  --  -1  --    HS TNI 4HR ng/L 4  --   --    HSTNI D4 ng/L 0  --   --              Results from last 7 days   Lab Units 02/02/22  0915   TSH 3RD GENERATON uIU/mL 2 910     ED Treatment:   Medication Administration from 02/02/2022 0821 to 02/02/2022 1823       Date/Time Order Dose Route Action     02/02/2022 0913 Labetalol HCl (NORMODYNE) injection 10 mg 10 mg Intravenous Given     02/02/2022 1125 morphine injection 2 mg 2 mg Intravenous Given     02/02/2022 1125 ondansetron (ZOFRAN) injection 4 mg 4 mg Intravenous Given     02/02/2022 1628 aspirin (ECOTRIN LOW STRENGTH) EC tablet 81 mg 81 mg Oral Given     02/02/2022 1628 pravastatin (PRAVACHOL) tablet 40 mg 40 mg Oral Given     02/02/2022 1628 heparin (porcine) subcutaneous injection 5,000 Units 5,000 Units Subcutaneous Given     02/02/2022 1628 amLODIPine (NORVASC) tablet 5 mg 5 mg Oral Given     02/02/2022 1633 labetalol (NORMODYNE) tablet 200 mg 200 mg Oral Not Given     02/02/2022 1628 Labetalol HCl (NORMODYNE) injection 10 mg 10 mg Intravenous Given        Past Medical History:   Diagnosis Date    Cardiac disorder     Congenital heart defect     last assessed 3/3/15, resolved 3/3/15    Endometrial polyp      Present on Admission:   Chest pain   Hypertensive urgency   Dyslipidemia   IFG (impaired fasting glucose)      Admitting Diagnosis: Chest pain [R07 9]  Hypertensive urgency [I16 0]  Age/Sex: 45 y o  female  Admission Orders:  48 hr telemetry monitoring  Cardiovascular diet; Stress test meal; no caffeine, no chocolates    Scheduled Medications:  amLODIPine, 5 mg, Oral, Daily  aspirin, 81 mg, Oral, Daily  atorvastatin, 40 mg, Oral, Daily With Dinner  fish oil, 1,000 mg, Oral, BID  heparin (porcine), 5,000 Units, Subcutaneous, Q8H MORIAH  labetalol, 200 mg, Oral, Q12H MORIAH  lisinopril, 20 mg, Oral, Daily      Continuous IV Infusions: none     PRN Meds:       IP CONSULT TO CARDIOLOGY    Network Utilization Review Department  ATTENTION: Please call with any questions or concerns to [phone] and carefully listen to the prompts so that you are directed to the right person  All voicemails are confidential   Wren Doing all requests for admission clinical reviews, approved or denied determinations and any other requests to dedicated fax number below belonging to the campus where the patient is receiving treatment   List of dedicated fax numbers for the Facilities:  [address] [place] [address] DENIALS (Administrative/Medical Necessity) [phone]   [address] (Maternity/NICU/Pediatrics) [phone] [address]  [phone]   Narcisa Ott [address] [place] [id number] [place] [address] [address] [facility] [facility] [phone]

## 2022-02-03 NOTE — PLAN OF CARE
Problem: Potential for Falls  Goal: Patient will remain free of falls  Description: INTERVENTIONS:  - Educate patient/family on patient safety including physical limitations  - Instruct patient to call for assistance with activity   - Consult OT/PT to assist with strengthening/mobility   - Keep Call bell within reach  - Keep bed low and locked with side rails adjusted as appropriate  - Keep care items and personal belongings within reach  - Initiate and maintain comfort rounds  - Make Fall Risk Sign visible to staff  Problem: CARDIOVASCULAR - ADULT  Goal: Maintains optimal cardiac output and hemodynamic stability  Description: INTERVENTIONS:  - Monitor I/O, vital signs and rhythm  - Monitor for S/S and trends of decreased cardiac output  - Administer and titrate ordered vasoactive medications to optimize hemodynamic stability  - Assess quality of pulses, skin color and temperature  - Assess for signs of decreased coronary artery perfusion  - Instruct patient to report change in severity of symptoms  Outcome: Progressing  Goal: Absence of cardiac dysrhythmias or at baseline rhythm  Description: INTERVENTIONS:  - Continuous cardiac monitoring, vital signs, obtain 12 lead EKG if ordered  - Administer antiarrhythmic and heart rate control medications as ordered  - Monitor electrolytes and administer replacement therapy as ordered  Outcome: Progressing     - Apply yellow socks and bracelet for high fall risk patients  - Consider moving patient to room near nurses station  Outcome: Progressing

## 2022-02-03 NOTE — PROGRESS NOTES
[facility]  Progress Note - Dali Asher 1970, 45 y o  female MRN: [id number]  Unit/Bed#: -01 Encounter: [id number]  Primary Care Provider: HARLEY Sahu   Date and time admitted to hospital: 2/2/2022  8:24 AM    * Chest pain  Assessment & Plan  · Presented to the ED 2/2 with L sided CP radiating to the L UE, worsening with exertion and associated with SOB  · Has noted elevated BP at home x 1 week and had hypertensive urgency on arrival to the ED with a BP of 213/100  · Trop normal  · EKG - NSR, T inversion in V2 to V4  · Cardiac cath in 2015 normal  · Meds added by cardiology for improved BP control  · Stress test - done today, official read still pending  · Cardiology input appreciated  · Check aldosterone/renin, a m  Cortisol  · Check renal Doppler - negative for ANA MARÍA, however 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  · Reached out to vascular surgery: no current intervention if asymptomatic & celiac/GIOVANNI patent, can follow up in office for incidental finding of SMA stenosis    · Continue escalation of medical therapy, labetalol 200 mg b i d , amlodipine 5 mg daily, and continue home lisinopril 20 mg a day   No clear evidence of volume overload or JVD to suggest absolute need of diuretic at this time    Hypertensive urgency  Assessment & Plan  · On Lisinopril 20 mg daily, reports inadequate BP control at home     · BP on arrival to the Ed 2/1 was 213/100  · Recommendations per cardiology as above   · Continue to monitor BP, have maintained stable    ASD secundum  Assessment & Plan  · H/o ASD secundum closure at the age of 31 yrs  · F/u echo    Dyslipidemia  Assessment & Plan  · On Pravastatin 40 mg daily and fish oil at home - continue  · Check FLP - mixed hyperlipidemia, no improvement observed when compared to historical results  · Consider [address] on atorvastatin 40mg     IFG (impaired fasting glucose)  Assessment & Plan  · HbA1c 6 on 12/4/21  · Ct low calorie diet  · Monitor BG      VTE Pharmacologic Prophylaxis: VTE Score: 3 Moderate Risk (Score 3-4) - Pharmacological DVT Prophylaxis Ordered: heparin  Patient Centered Rounds: I performed bedside rounds with nursing staff today  Discussions with Specialists or Other Care Team Provider: nursing and case management     Education and Discussions with Family / Patient: discussion and teaching with patient   Time Spent for Care: 45 minutes  More than 50% of total time spent on counseling and coordination of care as described above  Current Length of Stay: 1 day(s)  Current Patient Status: Inpatient   Certification Statement: The patient will continue to require additional inpatient hospital stay due to medical managment of chest pain and hypertensive urgency  Discharge Plan: Anticipate discharge in 24-48 hrs to home  Code Status: Level 1 - Full Code    Subjective:   Ms Rene Villaseñor reports feeling well today  She is AAOx3  Her symptoms have improved  States cp has improved and denies sob or palpitations  She is able tolerate p o, denies early satiety, N/V, vision changes  Objective:     Vitals:   Temp (24hrs), Av 9 °F (36 6 °C), Min:97 6 °F (36 4 °C), Max:98 2 °F (36 8 °C)    Temp:  [97 6 °F (36 4 °C)-98 2 °F (36 8 °C)] 97 9 °F (36 6 °C)  HR:  [62-75] 74  Resp:  [16-18] 18  BP: (118-169)/(69-82) 118/69  SpO2:  [94 %-97 %] 94 %  Body mass index is 33 2 kg/m²  Input and Output Summary (last 24 hours): Intake/Output Summary (Last 24 hours) at 2/3/2022 1611  Last data filed at 2/3/2022 1300  Gross per 24 hour   Intake 220 ml   Output --   Net 220 ml       Physical Exam:   Physical Exam  HENT:      Head: Normocephalic  Nose: Nose normal       Mouth/Throat:      Mouth: Mucous membranes are moist    Eyes:      Extraocular Movements: Extraocular movements intact  Conjunctiva/sclera: Conjunctivae normal    Cardiovascular:      Rate and Rhythm: Normal rate     Pulmonary:      Effort: Pulmonary effort is normal  No respiratory distress  Breath sounds: Normal breath sounds  No wheezing  Abdominal:      General: Bowel sounds are normal  There is no distension  Palpations: Abdomen is soft  Tenderness: There is no abdominal tenderness  Musculoskeletal:         General: No swelling  Cervical back: Neck supple  Skin:     General: Skin is warm and dry  Neurological:      General: No focal deficit present  Mental Status: She is alert and oriented to person, place, and time  Psychiatric:         Mood and Affect: Mood normal          Behavior: Behavior normal           Additional Data:     Labs:  Results from last 7 days   Lab Units 02/03/22 0452 02/02/22  0915 02/02/22  0915   WBC Thousand/uL 6 75   < > 8 27   HEMOGLOBIN g/dL 14 2   < > 14 5   HEMATOCRIT % 42 8   < > 42 2   PLATELETS Thousands/uL 273   < > 286   NEUTROS PCT %  --   --  66   LYMPHS PCT %  --   --  24   MONOS PCT %  --   --  7   EOS PCT %  --   --  2    < > = values in this interval not displayed  Results from last 7 days   Lab Units 02/03/22 0450 02/02/22 0915 02/02/22  0915   SODIUM mmol/L 137   < > 139   POTASSIUM mmol/L 3 9   < > 3 6   CHLORIDE mmol/L 106   < > 108   CO2 mmol/L 26   < > 23   BUN mg/dL 21   < > 21   CREATININE mg/dL 0 68   < > 0 63   ANION GAP mmol/L 5   < > 8   CALCIUM mg/dL 9 4   < > 9 9   ALBUMIN g/dL  --   --  4 1   TOTAL BILIRUBIN mg/dL  --   --  0 28   ALK PHOS U/L  --   --  75   ALT U/L  --   --  29   AST U/L  --   --  19   GLUCOSE RANDOM mg/dL 113   < > 104    < > = values in this interval not displayed                         Lines/Drains:  Invasive Devices  Report    Peripheral Intravenous Line            Peripheral IV 02/02/22 Right Forearm 1 day                Telemetry:  Telemetry Orders (From admission, onward)             48 Hour Telemetry Monitoring  (ED Bridging Orders Panel)  Continuous x 48 hours        References:    Telemetry Guidelines   Question:  Reason for 48 Hour Telemetry  Answer:  Acute MI, chest pain - R/O MI, or unstable angina                 Telemetry Reviewed: Normal Sinus Rhythm  Indication for Continued Telemetry Use: Acute MI/Unstable Angina/Rule out ACS             Imaging: Reviewed radiology reports from this admission including: chest xray and CT head    Recent Cultures (last 7 days):         Last 24 Hours Medication List:   Current Facility-Administered Medications   Medication Dose Route Frequency Provider Last Rate    amLODIPine  5 mg Oral Daily Veronica Santana MD      aspirin  81 mg Oral Daily Trevor Fuentes MD      atorvastatin  40 mg Oral Daily With [employer], DO      fish oil  1,000 mg Oral BID Trevor Fuentes MD      heparin (porcine)  5,000 Units Subcutaneous [facility] Trevor Fuentes MD      labetalol  200 mg Oral Q12H Michael Stubbs MD      lisinopril  20 mg Oral Daily Trevor Fuentes MD      regadenoson               Today, Patient Was Seen By: HARLEY Santiago    **Please Note: This note may have been constructed using a voice recognition system  **

## 2022-02-04 VITALS
RESPIRATION RATE: 15 BRPM | SYSTOLIC BLOOD PRESSURE: 154 MMHG | HEART RATE: 76 BPM | HEIGHT: 60 IN | WEIGHT: 170 LBS | DIASTOLIC BLOOD PRESSURE: 77 MMHG | BODY MASS INDEX: 33.38 KG/M2 | TEMPERATURE: 97.4 F | OXYGEN SATURATION: 94 %

## 2022-02-04 PROBLEM — I16.0 HYPERTENSIVE URGENCY: Status: RESOLVED | Noted: 2022-02-02 | Resolved: 2022-02-04

## 2022-02-04 PROBLEM — R07.9 CHEST PAIN: Status: RESOLVED | Noted: 2022-02-02 | Resolved: 2022-02-04

## 2022-02-04 RX ORDER — AMLODIPINE BESYLATE 5 MG/1
5 TABLET ORAL DAILY
Qty: 30 TABLET | Refills: 0 | Status: SHIPPED | OUTPATIENT
Start: 2022-02-04 | End: 2022-03-17 | Stop reason: SDUPTHER

## 2022-02-04 RX ORDER — LABETALOL 200 MG/1
200 TABLET, FILM COATED ORAL EVERY 12 HOURS SCHEDULED
Qty: 60 TABLET | Refills: 0 | Status: SHIPPED | OUTPATIENT
Start: 2022-02-04 | End: 2022-03-17 | Stop reason: SDUPTHER

## 2022-02-04 RX ORDER — ATORVASTATIN CALCIUM 40 MG/1
40 TABLET, FILM COATED ORAL
Qty: 30 TABLET | Refills: 0 | Status: SHIPPED | OUTPATIENT
Start: 2022-02-04 | End: 2022-03-17 | Stop reason: SDUPTHER

## 2022-02-04 RX ADMIN — LABETALOL HYDROCHLORIDE 200 MG: 200 TABLET, FILM COATED ORAL at 09:31

## 2022-02-04 RX ADMIN — AMLODIPINE BESYLATE 5 MG: 5 TABLET ORAL at 09:31

## 2022-02-04 RX ADMIN — ASPIRIN 81 MG: 81 TABLET, COATED ORAL at 09:30

## 2022-02-04 RX ADMIN — LISINOPRIL 20 MG: 20 TABLET ORAL at 09:31

## 2022-02-04 RX ADMIN — HEPARIN SODIUM 5000 UNITS: 5000 INJECTION INTRAVENOUS; SUBCUTANEOUS at 05:14

## 2022-02-04 RX ADMIN — OMEGA-3 FATTY ACIDS CAP 1000 MG 1000 MG: 1000 CAP at 09:30

## 2022-02-04 NOTE — DISCHARGE SUMMARY
[facility]  Discharge- Chapincito Linker 1970, 45 y o  female MRN: [id number]  Unit/Bed#: -[facility] Encounter: [id number]  Primary Care Provider: HARLEY Grant   Date and time admitted to hospital: 2/2/2022  8:24 AM    * Chest pain  Assessment & Plan  · Presented to the ED 2/2 with L sided CP radiating to the L UE, worsening with exertion and associated with SOB  · Has noted elevated BP at home x 1 week and had hypertensive urgency on arrival to the ED with a BP of 213/100  · Trop normal x 3   · EKG - NSR, T inversion in V2 to V4, unchanged from baseline  · Meds added by cardiology for improved BP control  · Stress test - non indicative of ischemic disease, discussed case with cardiology, cleared for discharge  · Cardiology input:  · Continue escalation of medical therapy at [place], labetalol 200 mg b i d , amlodipine 5 mg daily, and lisinopril 20 mg a day, follow up with PCP and cardiology as outpatient  Has appt scheduled w cardiology 2/8/22  · Renal Doppler - negative for ANA MARÍA, however 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  · Reached out to vascular surgery: no current intervention if asymptomatic & celiac/GIOVANNI patent, can follow up as outpatient for incidental finding of SMA stenosis        Hypertensive urgency  Assessment & Plan  · On Lisinopril 20 mg daily, reports inadequate BP control at home     · BP on arrival to the ED 2/1 was 213/100  · Recommendations per cardiology as above     ASD secundum  Assessment & Plan  · H/o ASD secundum closure at the age of 31 yrs  · Cardiology recommends follow up outpatient     Dyslipidemia  Assessment & Plan  · On Pravastatin 40 mg daily and fish oil at home  · mixed hyperlipidemia, no improvement observed when compared to historical results  · DC on atorvastatin 40mg    IFG (impaired fasting glucose)  Assessment & Plan  · HbA1c 6 on 12/4/21  · Encouraged life style modification         Medical Problems             Resolved Problems  Date Reviewed: 11/23/2021    None              Discharging Physician / Practitioner: Keyonna Wilkerson  PCP: Keyonna Escobar  Admission Date:   Admission Orders (From admission, onward)     Ordered        02/02/22 1408  INPATIENT ADMISSION  Once                      Discharge Date: 02/04/22    Consultations During Hospital Stay:  · Cardiology     Procedures Performed:   · None    Significant Findings / Test Results:   · ECHO - LVEF 60%, mild tricuspid valve regurgitation  XR chest 2 views    Result Date: 2/2/2022  Impression: No acute cardiopulmonary disease  Workstation performed: [id number]     CT head without contrast    Result Date: 2/2/2022  · Impression: No acute intracranial abnormality  Workstation performed: [id number]     Incidental Findings:   · VAS Renal artery: >70% stenosis at the proximal SMA    Test Results Pending at Discharge (will require follow up): · None      Outpatient Tests Requested:  · None     Complications:  None    Reason for Admission: chest pain, hypertensive urgency     Hospital Course:   Jordy Sutton is a 45 y o  female patient who originally presented to the hospital on 2/2/2022 due to complaint of worsening chest pain associated with sob  She described the pain as sharp stabbing pain  PMHX included HTN, dyslipidemia, and ASD secundum  She reported being on monotherapy for management of her HTN and inadequate at home measurements measuring 307-019 systolic  Cardiology was consulted, labetalol 200mg bid and amodipine 5mg were initiated  Renal function was normal, no significant electrolyte derangements, EKG unchanged from her baseline, troponin normal x 3, CT of head did not show any evidence of intercranial hemorrhage  She was [facility] to [facility] with plan for VAS of renal artery and stress test if chest pain continued despite BP control  On day 1 of hospital stay Ms Toney Sim reported improvement in her symptoms, she denied any SOB, palpitations, N/V  Renal artery doppler was completed, incidental finding of >70% stenosis at the proximal SMA and cardiology recommended outpatient follow up  Stress test was not evident for ischemic disease  On day 2, day of discharge, Ms Kike Iverson denies any chest pain, sob, palpitations, N/V  She is tolerating p o  Cleared for discharge to home and follow up in outpatient in cardiology  Please see above list of diagnoses and related plan for additional information  Condition at Discharge: fair    Discharge Day Visit / Exam:   Subjective:  Patient is doing well, improved chest pain  She denies any sob, palpitations, vision changes, N/V  Tolerating p o  Vitals: Blood Pressure: 108/65 (02/03/22 2306)  Pulse: 87 (02/03/22 2306)  Temperature: 98 9 °F (37 2 °C) (02/03/22 2306)  Temp Source: Oral (02/03/22 2306)  Respirations: 18 (02/03/22 2306)  Height: 5' (152 4 cm) (02/03/22 1500)  Weight - Scale: 77 1 kg (170 lb) (02/03/22 1500)  SpO2: 92 % (02/03/22 2306)  Exam:   Physical Exam  HENT:      Head: Normocephalic  Nose: Nose normal       Mouth/Throat:      Mouth: Mucous membranes are moist    Eyes:      Extraocular Movements: Extraocular movements intact  Conjunctiva/sclera: Conjunctivae normal    Cardiovascular:      Rate and Rhythm: Normal rate  Pulmonary:      Effort: Pulmonary effort is normal  No respiratory distress  Breath sounds: Normal breath sounds  No wheezing  Abdominal:      General: Bowel sounds are normal  There is no distension  Palpations: Abdomen is soft  Tenderness: There is no abdominal tenderness  Musculoskeletal:         General: No swelling  Cervical back: Neck supple  Skin:     General: Skin is warm and dry  Neurological:      General: No focal deficit present  Mental Status: She is alert and oriented to person, place, and time     Psychiatric:         Mood and Affect: Mood normal          Behavior: Behavior normal           Discussion with Family: Patient declined call to   Discharge instructions/Information to patient and family:   See after visit summary for information provided to patient and family  Provisions for Follow-Up Care:  See after visit summary for information related to follow-up care and any pertinent home health orders  Disposition:   Home     Discharge Statement:  I spent 35 minutes discharging the patient  This time was spent on the day of discharge  I had direct contact with the patient on the day of discharge  Greater than 50% of the total time was spent examining patient, answering all patient questions, arranging and discussing plan of care with patient as well as directly providing post-discharge instructions  Additional time then spent on discharge activities  Discharge Medications:  See after visit summary for reconciled discharge medications provided to patient and/or family        **Please Note: This note may have been constructed using a voice recognition system**

## 2022-02-04 NOTE — ASSESSMENT & PLAN NOTE
· On Lisinopril 20 mg daily, reports inadequate BP control at home     · BP on arrival to the ED 2/1 was 213/100  · Recommendations per cardiology as above

## 2022-02-04 NOTE — ASSESSMENT & PLAN NOTE
· Presented to the ED 2/2 with L sided CP radiating to the L UE, worsening with exertion and associated with SOB  · Has noted elevated BP at home x 1 week and had hypertensive urgency on arrival to the ED with a BP of 213/100  · Trop normal x 3   · EKG - NSR, T inversion in V2 to V4, unchanged from baseline  · Meds added by cardiology for improved BP control  · Stress test - non indicative of ischemic disease   · Cardiology input:  · Continue escalation of medical therapy at [place], labetalol 200 mg b i d , amlodipine 5 mg daily, and lisinopril 20 mg a day, follow up with PCP and cardiology as outpatient  · Renal Doppler - negative for ANA MARÍA, however 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    · Reached out to vascular surgery: no current intervention if asymptomatic & celiac/GIOVANNI patent, can follow up as outpatient for incidental finding of SMA stenosis

## 2022-02-04 NOTE — DISCHARGE INSTR - AVS FIRST PAGE
Zachary Durant been prescribed amlodipine 5mg once daily, please be sure to follow up with cardiology outpatient     - You've been prescribed labetalol 200mg twice daily, please be sure to follow up with cardiology outpatient     - Abhay Quinones been prescribed atorvastatin 40mg once daily, please be sure to stop taking pravastatin 40mg and take atorvastatin 40mg daily instead  - If you develop chest pain, shortness of breath, vision changes, please report to the nearest emergency room  - Please be sure to call your primary care provider to schedule a follow up visit  - If you are unable to make an appointment with cardiology please call [facility] at [phone] to schedule an appointment

## 2022-02-04 NOTE — ASSESSMENT & PLAN NOTE
· On Pravastatin 40 mg daily and fish oil at home - continue  · mixed hyperlipidemia, no improvement observed when compared to historical results  · DC on atorvastatin 40mg

## 2022-02-04 NOTE — DISCHARGE INSTRUCTIONS
Dolor de pecho   LO QUE NECESITA SABER:   El dolor en el pecho puede ser provocado por yoselyn variedad de condiciones, algunas no tan serias y otras que son de peligro mortal  Reeda Hind ser un síntoma de un problema digestivo, vanessa la acidez o yoselyn úlcera estomacal  Un ataque de ansiedad o yoselyn emoción ita, vanessa el enojo, también pueden provocar dolor de [place]  Yoselyn infección, inflamación o fractura en un hueso o cartílago en el pecho podría provocar dolor o molestia  En ocasiones el dolor torácico o la presión en el pecho pueden ser el resultado de lena circulación de la eli al corazón (angina)  El dolor de pecho también puede ser causado por trastornos potencialmente mortales vanessa un ataque al corazón o un coágulo de [employer]  INSTRUCCIONES SOBRE EL YURIY HOSPITALARIA:   Llame al [employer] de emergencias (911 en los [place]) o pídale a alguien que llame si:  · Tiene alguno de los siguientes signos de un ataque cardíaco:      ? Estrujamiento, presión o tensión en yadav pecho    ? Usted también podría presentar alguno de los siguientes:     § Malestar o dolor en yadav espalda, lio, mandíbula, abdomen, o brazo    § Falta de [employer] o vómitos    § Desvanecimiento o sudor frío repentino      Busque atención médica de inmediato si:  · La inflamación en yadav pecho empeora, aun con tratamiento  · Usted tose o vomita eli  · Bridgett heces son negras o tienen eli  · Usted no puede dejar de vomitar o le duele al tragar  Llame a yadav médico si:  · Usted tiene preguntas o inquietudes acerca de yadav condición o cuidado  Medicamentos:  · Los medicamentos pueden administrarse para tratar la causa del dolor de [place]  Por ejemplo, analgésicos, medicamentos para la ansiedad o medicamentos para aumentar el flujo de eli al corazón      · No tome ciertos medicamentos sin antes preguntarle a yadav médico  Estos incluyen JACINTO, suplementos vitamínicos o a base de hierbas u hormonas (estrógeno o progestágeno)  · Imbery sharon medicamentos vanessa se le haya indicado  Consulte con ledezma médico si usted maximilian que ledezma medicamento no le está ayudando o si presenta efectos secundarios  Infórmele si es alérgico a cualquier medicamento  Mantenga yoselyn lista actualizada de los [place], las vitaminas y los productos herbales que jeison  Incluya los siguientes datos de los medicamentos: cantidad, frecuencia y motivo de administración  Traiga con usted la lista o los envases de las píldoras a sharon citas de seguimiento  Lleve la lista de los medicamentos con usted en puneet de yoselyn emergencia  Consejos para vivir saludable: Los siguientes son consejos generales de umu  Si se conoce la causa de ledezma dolor de [place], ledezma médico le dará pautas específicas a seguir  · No fume  La nicotina y otros químicos contenidos en los cigarrillos y cigarros pueden causar daño a sharon pulmones y el corazón  Pida información a ledezma médico si usted actualmente fuma y necesita ayuda para dejar de fumar  Los cigarrillos electrónicos o el tabaco sin humo igualmente contienen nicotina  Consulte con ledezma médico antes de [employer]  · Elija yoselyn variedad de alimentos saludables tan a menudo vanessa sea posible  Rex Mendes y verduras frescas, congeladas o enlatadas  También incluya productos lácteos bajos en grasa, pescado, anjum (sin piel) y jj magras  Ledezma médico o dietista pueden ayudarlo a crear planes de alimentos  Es posible que tenga que evitar ciertos alimentos o bebidas si el dolor es causado por un problema de digestión  · Reduzca el consumo de sodio (jose luis)  Limite el consumo de alimentos altos en sodio, vanessa comidas enlatadas, bocadillos salados y embutidos  Si añade jose luis cuando cocina la comida, no añada más en la williamson  Elija alimentos enlatados bajos en sodio tanto vanessa sea posible  · Consuma abundante agua al día   El agua ayuda al cuerpo a controlar la temperatura y la presión arterial  Pregunte a yadav médico cuál es la cantidad de agua que debería consumir cada día  · Pregunte acerca de la [place]  Yadav médico le dirá cuáles actividades limitar y cuáles evitar  Pregunte cuándo [employer], regresar a yadav trabajo y [employer]  Pida más información acerca de un plan de ejercicio adecuado para usted  · Mantenga un peso saludable  Pregúntele a yadav médico cuál es el peso ideal para usted  Pídale que lo ayude a crear un plan seguro para bajar de peso si tiene sobrepeso  · Pregunte sobre las vacunas que pudiera necesitar  Vacúnese contra la influenza (gripe) todos los años tan pronto vanessa se recomiende, normalmente en septiembre u octubre  Usted también podría necesitar la vacuna antineumocócica para evitar la neumonía  La vacuna se administra generalmente cada 5 años, a partir de los [address]  Yadav médico puede indicarle si debe recibir [place], y cuándo aplicárselas  Programe yoselyn armando con yadav médico dentro de 67 horas o vanessa se le indique: Es posible que deba regresar para hacerse más pruebas para encontrar la causa del dolor de [place]  Es probable que lo refieran a un especialista, vanessa un cardiólogo o un gastroenterólogo  Anote sharon preguntas para que se acuerde de hacerlas veronique sharon visitas  © Copyright [employer] 2021 Information is for End User's use only and may not be sold, redistributed or otherwise used for commercial purposes  All illustrations and images included in CareNotes® are the copyrighted property of A D A [employer]  or [address] [place] [address] es sólo para uso en educación  Yadav intención no es darle un consejo médico sobre enfermedades o tratamientos  Colsulte con yadav Pb Lauth farmacéutico antes de seguir cualquier régimen médico para saber si es seguro y efectivo para usted  El colesterol y yadav [address] SABER:   ¿Qué es el colesterol? El colesterol es yoselyn sustancia cerosa y Julito Lucie   Aydav cuerpo [employer] para fabricar hormonas y células nuevas y para proteger los nervios  El colesterol es producido por yadav cuerpo  También proviene de ciertos alimentos que come, vanessa la carne y los productos lácteos  Yadav médico puede ayudarle a establecer metas para bridgett niveles de colesterol  Puede ayudarle a crear un plan para alcanzar bridgett metas  ¿Cuáles son los objetivos de colesterol? Bridgett metas de colesterol dependen de yadav riesgo de enfermedad cardíaca, yadav edad y otras afecciones médicas  Las siguientes son reglas generales al respecto:  · El colesterol total incluye lipoproteínas de baja densidad (LDL), lipoproteínas de sheri densidad (HDL) y triglicéridos  El nivel de colesterol total debe ser inferior a 200 mg/dL y es mejor a unos 150 mg/dL  · El colesterol LDL se denomina colesterol nidia  porque forma placas en las arterias  Cuando la placa se acumula, las arterias se estrechan, y reduce el flujo de [place]  Cuando la placa reduce el flujo de la eli al corazón, es probable que usted tenga dolor en el pecho  Si la placa obstruye completamente yoselyn arteria que lleva eli al corazón, usted podría sufrir un ataque cardíaco  La placa puede romperse y formar coágulos de [place]  Los coágulos de eli podrían bloquear las arterias de yadav cerebro y causarle un derrame  El nivel debe ser inferior a 130 mg/dL y es mejor a unos 100 mg/dL  · El colesterol HDL se llama colesterol castro  porque ayuda a eliminar el colesterol LDL de las arterias  Lo hace al adherirse al colesterol LDL y llevarlo a yadav hígado  El hígado descompone el colesterol LDL para que yadav cuerpo pueda deshacerse de él  Los niveles altos de colesterol HDL pueden ayudar a prevenir ataques cardíacos y accidentes cerebrovasculares  Los niveles bajos de colesterol HDL pueden aumentar el riesgo de enfermedad cardíaca, ataque cardíaco y accidente cerebrovascular  El nivel debe ser de 60 mg/dL o superior      · Los triglicéridos son un tipo de grasa que [employer] energía de los [place] usted come  Los [facility] de triglicéridos también causan la acumulación de placa  [place] puede aumentar el riesgo de un ataque cardíaco o derrame cerebral  Si yadav nivel de triglicéridos es alto, yadav nivel de colesterol LDL también puede ser alto  El nivel debe ser inferior a 150 mg/dL  ¿Qué aumenta mi riesgo de colesterol alto? · Fumar cigarrillos    · El sobrepeso o la obesidad, o no realizar suficiente ejercicio    · Shelia grandes cantidades de alcohol    · Afecciones médicas, vanessa hipertensión (presión arterial sheri) o diabetes    · Ciertos genes pasan de sharon padres a usted  · Ser mayor de 71 años    ¿Qué necesito saber sobre el control de mis niveles de colesterol? Los adultos de 20 a 45 años de edad deben controlar sharon niveles de colesterol cada 4 a 6 años  Los adultos de [address] controlar yadav colesterol cada 1 a 2 años  Puede que necesite controlar yadav colesterol más a menudo, o a yoselyn edad más temprana, si tiene factores de riesgo para enfermedades del corazón  También deberá controlar yadav colesterol más a menudo si tiene otras condiciones de [facility], vanessa la diabetes  Los análisis de [place] para verificar los niveles de [place]  Los análisis de eli miden los niveles de triglicéridos, colesterol LDL y colesterol HDL  ¿Cómo afectan las grasas saludables mis niveles de colesterol? Las grasas saludables, también llamadas grasas insaturadas, ayudan a reducir el colesterol LDL y los triglicéridos  Las grasas saludables incluyen lo siguiente:  · Las grasas monoinsaturadas se encuentran en alimentos vanessa el aceite de [place], aceite canola, [place], nueces y [place]  · [place], vanessa grasas [place] [address], se encuentran en pescados, vanessa el salmón, la trucha y [place] breeze  También se pueden encontrar en alimentos vegetales vanessa la linaza, las nueces y soya    ¿Cómo afectan las grasas poco saludables mis niveles de colesterol?  Eileen Moses no saludables pueden aumentar el colesterol LDL y los triglicéridos  Estas grasas se encuentran en alimentos con alto contenido de colesterol, grasas saturadas y grasas trans:  · El colesterol se encuentra en los [employer], productos lácteos y carne  · Las grasas saturadas se encuentra en la [facility] york, el queso, el helado, la [place] entera y aceite de [place]  La grasa saturada se encuentra también en carne, vanessa salchichas, valarie caliente, y [place]  · La grasa trans se encuentra en los aceites líquidos y se [place] en los alimentos fritos y horneados  Los alimentos que contienen grasas trans [employer] chandrakant fritas, galletas saladas, molletes, pan [place], palomitas de maíz de microondas y galletas dulces  ¿Cómo se trata el colesterol alto? El tratamiento para el colesterol alto también disminuirá yadav riesgo de enfermedad cardíaca, ataque cardíaco y accidente cerebrovascular  Es posible que deba seguir alguno de los siguientes tratamientos:  · Cambios en el estilo de alex pueden incluir comida, ejercicio, pérdida de peso y dejar de fumar  Puede también que necesite disminuir la cantidad de bebidas alcohólicas que ingiere  Yadav médico querrá comenzar con cambios en el estilo de alex  Se puede agregar otro tratamiento si los cambios en el estilo de alex no son suficientes  Yadav médico puede recomendarle que trabaje con un equipo para controlar la hiperlipidemia  El equipo puede incluir expertos médicos, vanessa un dietista, un fisioterapeuta o un terapeuta del comportamiento  Los [employer] de yadav cecelia pueden participar en la creación de cambios en el estilo de alex      · Los medicamentos pueden administrarse para bajar yadav colesterol LDL, triglicéridos o nivel de colesterol total  Puede necesitar medicamentos para bajar yadav colesterol si cualquiera de las siguientes afirmaciones es cierta:    ? Usted tiene antecedentes de derrame cerebral, accidente isquémico transitorio, angina de pecho inestable o ataque cardíaco     ? Ledezma nivel de colesterol LDL es de 190 mg/dL o superior  ? Usted tiene de 36 a 76 años, tiene factores de riesgo de diabetes o enfermedad cardíaca y ledezma colesterol LDL es de 70 mg/dL o superior  · Los suplementos, incluyen aceite de pescado, arroz de Toni Potter y [place]  El aceite de pescado puede ayudar a [employer] triglicéridos y los niveles de colesterol LDL  También puede aumentar ledezma nivel de colesterol HDL  El arroz de [place] David puede ayudar a disminuir ledezma nivel de colesterol total y el nivel de colesterol LDL  El ajo puede ayudar a disminuir el nivel de colesterol total  No tome ningún suplemento sin antes consultar con ledezma médico     ¿Qué cambios en las comidas puedo realizar para bajar mis niveles de colesterol? Un dietista puede ayudarlo a crear un plan de alimentación saludable  Puede mostrarle cómo leer las etiquetas de los alimentos para elegir alimentos bajos en grasas saturadas, grasas trans y colesterol  · Reduzca la cantidad total de grasa que usted consume  Elija jj magras, leche descremada o con 1% de [place] y productos lácteos bajos en grasa, vanessa yogur y Paul-barre  Trate de limitar o evitar las jj loera  Limite o no coma alimentos fritos o productos horneados, vanessa galletas  · [address] grasas no saludables por grasa saludables  Cocine los alimentos en aceite de rodrigez o aceite de canola  Elija margarinas suaves que shalini bajas en grasas saturadas y grasas trans  Las semillas, nueces y aguacates son otros ejemplos de grasas saludables  · Coma alimentos con grasas [place]  Srinivasan Landaverde son salmón, atún, Ansley Palomares y semillas de cricket  Coma pescado 2 veces por semana  Las [employer] no deberían comer pescado con niveles altos de lewis, vanessa tiburón, pez kishore y pez caballa kirti  · Aumente la cantidad de alimentos ricos en fibra que come  Los alimentos ricos en fibra pueden ayudar a disminuir el colesterol LDL   Ledezma objetivo debe ser consumir entre 20 y 27 gramos de fibra por día  Carmen Cheers y verduras son ricas en fibra  Consuma por lo menos 5 porciones al día  Otros alimentos ricos en fibra incluyen panes, pastas o cereales de grano entero o integrales, y arroz integral  Consuma 3 onzas de alimentos integrales al día  Aumente la fibra lentamente  Usted podría presentar malestar o inflamación estomacal y gases si añade fibra a yadav dieta demasiado rápido  · Coma alimentos proteicos saludables  Los ejemplos incluyen productos lácteos bajos en grasa, anjum y pavo sin piel, pescado y nueces  · Limite los alimentos y las bebidas con un gran contenido de azúcar  Yadav dietista o yadav médico pueden ayudarlo a crear límites diarios para los alimentos y bebidas con alto contenido de azúcar  El límite puede ser más bajo si usted tiene diabetes u otra afección [place]  Los límites también pueden ayudarlo a perder peso, de ser necesario  ¿Qué cambios de estilo de alex puedo realizar para mejorar mis niveles de colesterol? · Mantenga un peso saludable  Pregúntele a yadav médico cuál es el peso ideal para usted  Pídale que lo ayude a crear un plan para perder peso, si lo necesita  Bajar de peso puede reducir bridgett niveles del colesterol total y triglicéridos  La pérdida de peso también puede ayudar a mantener yadav presión arterial a un nivel saludable  · Manténgase físicamente activo veronique todo el día  La actividad física, vanessa el ejercicio, puede ayudar a bajar yadav nivel de colesterol total y mantener un peso saludable  La actividad física también puede ayudar a aumentar yadav nivel de colesterol HDL  Colabore con yadav médico para desarrollar un programa que sea adecuado para usted  Rey al menos de 30 a 40 minutos de [place] física moderada la mayoría de los días de la [place]  Algunos ejemplos de ejercicios incluyen caminar enérgicamente, nadar o andar en bicicleta  Incluya también entrenamiento de fuerza al menos 2 veces por semana   [place] médicos pueden ayudarle a crear un plan de actividad física  · No fume  La nicotina y otros químicos en los cigarrillos y cigarros pueden elevar sharon niveles de colesterol  Pida información a yadav médico si usted actualmente fuma y necesita ayuda para dejar de fumar  Los cigarrillos electrónicos o el tabaco sin humo igualmente contienen nicotina  Consulte con yadav médico antes de [employer]  · Limite o no consuma bebidas alcohólicas  El alcohol puede aumentar sharon niveles de triglicéridos  Pregúntele a yadav médico antes de beber alcohol  Pregunte cuánto puede beber en 24 horas o 1 semana  ACUERDOS SOBRE YADAV CUIDADO:   Usted tiene el derecho de ayudar a planear yadav cuidado  Discuta sharon opciones de tratamiento con sharon médicos para decidir el cuidado que usted desea recibir  Usted siempre tiene el derecho de rechazar el tratamiento  Esta información es sólo para uso en educación  Yadav intención no es darle un consejo médico sobre enfermedades o tratamientos  Colsulte con yadav Sadie Mall farmacéutico antes de seguir cualquier régimen médico para saber si es seguro y efectivo para usted  © Copyright [employer] 2021 Information is for End User's use only and may not be sold, redistributed or otherwise used for commercial purposes  All illustrations and images included in CareNotes® are the copyrighted property of A D A M , Inc  or Sha Kelley 761 hipertensiva   LO QUE NECESITA SABER:   La crisis hipertensiva es un aumento repentino en la presión sanguínea de 180/120 o más  [place] presión arterial normal es 119/79 o inferior  Yoselyn crisis hipertensiva se denomina también hipertensión [place]  [place] es yoselyn emergencia médica que puede causar daño en los órganos o ser potencialmente mortal        Cherelle Isaiah EL YURIY HOSPITALARIA:   Llame al 911 en puneet de presentar lo siguiente:  · Usted tiene dolor en el pecho  · Usted tiene dolor de espalda o falta de aire      · Usted siente debilidad o adormecimiento en la sil, brazos o piernas  · Usted no puede yolande o hablar [place]ett ace vanessa usualmente lo hace  Busque atención médica de inmediato si:  · Usted tiene un ita dolor de [place]  Comuníquese con yadav médico si:  · Yadav presión arterial es de 180/110 o más sheri carlee no tiene otros síntomas  · Usted tiene preguntas o inquietudes acerca de yadav condición o cuidado  Medicamentos: Es posible que usted necesite alguno de los siguientes:  · Medicamentos para la presión arterial se administra para disminuir yadav presión arterial  Existen muchos tipos diferentes de medicamentos para la presión arterial y es probable que usted necesite más de un tipo  Es muy importante vick el medicamento de la presión arterial exactamente vanessa se indica  Omitir dosis puede provocar yoselyn crisis hipertensiva  Hable con yadav médico si usted tiene efectos secundarios de yadav medicamento  No deje de tomarlo sin hablar maria l con yadav médico     · Los diuréticos ayudan a eliminar el exceso de líquido que se acumula en sharon vasos sanguíneos  [place] reduce la presión arterial al disminuir la presión en las arterias  Los diuréticos se conocen también vanessa píldoras de [place]  Es posible que orine más seguido mientras jeison javier medicamento  · [place] sharon medicamentos vanessa se le haya indicado  Consulte con yadav médico si usted maximilian que yadav medicamento no le está ayudando o si presenta efectos secundarios  Infórmele si es alérgico a cualquier medicamento  Mantenga yoselyn lista actualizada de los [place], las vitaminas y los productos herbales que jeison  Incluya los siguientes datos de los medicamentos: cantidad, frecuencia y motivo de administración  Traiga con usted la lista o los envases de las píldoras a sharon citas de seguimiento  Lleve la lista de los medicamentos con usted en puneet de yoselyn emergencia      Programe yoselyn armando con yadav médico para dentro de 1 semana o según se le haya indicado: Usted tendrá que regresar a que le tomen la presión arterial y Tamela Lucita para realizarse otras pruebas  Yadav médico podría también remitirlo a un cardiólogo  Anote sharon preguntas para que se acuerde de hacerlas veronique sharon visitas  Prevenir otra crisis hipertensiva:  · Controle yadav presión arterial en casa  Siéntese y descanse por 5 minutos antes de tomarse la presión arterial  Extienda yadav brazo y apóyelo en yoselyn superficie plana  Yadav brazo debe estar a la misma altura que yadav corazón  Siga las instrucciones que vienen con el monitor para la presión arterial  Si es posible, tome por lo menos 2 lecturas de la presión cada vez  [place]ese la presión arterial al menos 2 veces al día a la misma hora cada día, vanessa por la mañana y por la noche  Mantenga un registro de yadav peso y llévelo con usted a las citas de control  Pregúntele a yadav médico cuál debería ser yadav presión arterial          · Controle cualquier otra condición médica que usted tenga  Algunas condiciones médicas vanessa la diabetes pueden aumentar yadav riesgo de hipertensión  [address] yadav médico y tómese sharon medicamentos según dichas instrucciones  · Pregunte [employer]  Ciertos medicamentos pueden aumentar yadav presión arterial  Los ejemplos incluyen las píldoras anticonceptivas orales, los descongestivos, los suplementos herbales y los [place], vanessa el ibuprofeno  Yadav médico puede indicarle qué medicamentos son seguros para usted  Estos medicamentos incluyen los recetados y de [place]  · Limite el sodio (la sal) vanessa se le haya indicado  Demasiado sodio puede afectar el equilibrio de líquidos  Revise las etiquetas para buscar alimentos bajos en sodio o sin sal agregada  Algunos alimentos bajos en sodio utilizan sales de potasio para añadir sabor  Demasiado potasio también puede causar problemas de [place]  Yadav médico le dirá qué cantidad de sodio y potasio es cerna para el consumo en un día  Él puede recomendarle que limite el sodio a 2,300 mg al día           · Siga el plan de comidas recomendado por yadav médico  Un dietista o médico puede darle más información sobre planes de bajo contenido de sodio o el plan de alimentación DASH (enfoques dietéticos para detener la hipertensión)  El plan DASH es bajo en sodio, grasas saturadas y grasa total  Es alto en potasio, calcio y [place]  · Ejercítese para mantener un peso saludable  Realice actividad física por lo menos 30 minutos al día, la mayoría de los días de la [place]  [place] ayudará a bajar yadav presión arterial  Pregunte a yadav médico acerca del mejor plan de ejercicio para usted  · [facility] estrés  Es posible que esto contribuya a bajar yadav presión arterial  Aprenda sobre formas de [place], vanessa respiración profunda o escuchar música  · Limite el consumo de alcohol según le indicaron  El alcohol puede aumentar la presión arterial  Un trago equivale a 12 onzas de cerveza, 5 onzas de vino o 1 onza y ½ de licor  · No fume  La nicotina y otros químicos en los cigarrillos y cigarros pueden aumentar yadav presión arterial y también provocar daño al pulmón  Pida información a yadav médico si usted actualmente fuma y necesita ayuda para dejar de fumar  Los cigarrillos electrónicos o el tabaco sin humo igualmente contienen nicotina  Consulte con yadav médico antes de [employer]  Para más información:  · [address]  [place]  [place] [facility]   Phone: [phone]  Web Address: https://[employer]/  [employer] [facility] [place] 2021 Information is for End User's use only and may not be sold, redistributed or otherwise used for commercial purposes  All illustrations and images included in CareNotes® are the copyrighted property of A D A [employer]  or [address] [place] [address] es sólo para uso en educación  Yadav intención no es darle un consejo médico sobre enfermedades o tratamientos   Colsulte con yadav Corrina Jose Alberto farmacéutico antes de seguir cualquier régimen médico para saber si es seguro y efectivo para usted  La prediabetes   LO QUE NECESITA SABER:   La prediabetes es un nivel de glucosa (azúcar) en la eli que es más alto de lo normal  No es lo suficientemente alto para ser considerado diabetes  La prediabetes aumenta el riesgo de diabetes tipo 2 y enfermedad del corazón  [facility]   Llame a yadav médico si:  · Usted tiene más hambre o sed de lo usual     · Usted está orinando con más frecuencia de lo normal     · Siempre está exhausto  · Usted tiene visión borrosa  · Usted tiene preguntas o inquietudes acerca de yadav condición o cuidado  Medicamentos:  · Medicamentos se pueden administrar si usted está en alto riesgo de diabetes tipo 2  También podrían administrarle medicamentos para bajar la presión arterial yuriy y el colesterol alto  · Clifford sharon medicamentos vanessa se le haya indicado  Consulte con yadav médico si usted maximilian que yadav medicamento no le está ayudando o si presenta efectos secundarios  Infórmele si es alérgico a cualquier medicamento  Mantenga yoselyn lista actualizada de los [place], las vitaminas y los productos herbales que jeison  Incluya los siguientes datos de los medicamentos: cantidad, frecuencia y motivo de administración  Traiga con usted la lista o los envases de las píldoras a sharon citas de seguimiento  Lleve la lista de los medicamentos con usted en puneet de yoselyn emergencia  Prevenga o retrase la diabetes tipo 2: Las opciones saludables funcionan mejor para retrasar o prevenir la diabetes tipo 2  Puede disminuir el riesgo de padecer diabetes tipo 2 realizando lo siguiente:  · Realice actividad física regularmente  La actividad física, vanessa el ejercicio, puede ayudar a disminuir yadav nivel de azúcar en la eli  También puede ayudar a disminuir el riesgo de tener enfermedades cardíacas y ayudarle a perder peso  Los adultos deben hacer al menos 150 minutos (2 5 horas) de actividad física moderada cada semana   Reparta la cantidad de [employer] al menos 3 días a la semana  No deje de realizarla veronique más de 2 días seguidos  Los niños deben hacer al menos 60 minutos de actividad física moderada la mayoría de los días de la [place]  Ejemplos de actividad física moderada incluyen caminar a paso ligero, correr y nadar  No permanezca sentada por más de 30 minutos cada vez  Colabore con yadav médico para crear un plan de [employer]  · Baje de peso si usted tiene sobrepeso  Yoselyn pérdida de peso del 7% de yadav peso corporal puede ayudar a reducir el nivel de azúcar en yadav eli  Yadav médico puede indicarle cuál es el peso saludable para usted  Puede ayudarlo a crear un plan para perder peso  · Consuma alimentos saludables  Consuma yoselyn variedad de frutas y vegetales  Consuma alimentos de grano integral con más frecuencia  Escoja productos lácteos, jj y otras proteínas que shalini bajas en grasa  Consuma pocos dulces vanessa caramelos, galletas, sodas regulares y bebidas azucaradas  Usted también puede disminuir las calorías comiendo porciones pequeñas  Trabaje junto con yadav médico o dietista para desarrollar un plan de alimentación adecuado para usted  · [employer] vanessa se le haya indicado  Yadav médico puede indicarle medicamentos para la diabetes si usted está en alto riesgo de tener diabetes  También puede necesitar medicamentos para la hipertensión y para el colesterol alto  · Acuda a sharon consultas de control con yadav médico según le indicaron  Usted necesitará regresar cada año para que le realicen pruebas de diabetes  · No fume  Fumar podría aumentar el riesgo de diabetes tipo 2  La nicotina puede dañar los vasos sanguíneos  Puede tener otras afecciones, vanessa enfermedad pulmonar, si usted fuma  No use cigarrillos electrónicos o tabaco sin humo en vez de cigarrillos o para tratar de dejar de fumar  Todos estos aún contienen nicotina   Pida a yadav médico información si usted fuma actualmente y [place] para dejar de hacerlo  Acuda a la consulta de control con yadav médico según las indicaciones: Usted necesitará regresar cada año para que le realicen pruebas de diabetes  Anote sharon preguntas para que se acuerde de hacerlas veronique sharon visitas  © Copyright [facility] [address] Information is for End User's use only and may not be sold, redistributed or otherwise used for commercial purposes  All illustrations and images included in CareNotes® are the copyrighted property of A D A M , [employer] ([employer]: [place])  or [address] [place] [address] es sólo para uso en educación  Yadav intención no es darle un consejo médico sobre enfermedades o tratamientos  Colsulte con yadav Sotero Keas farmacéutico antes de seguir cualquier régimen médico para saber si es seguro y efectivo para usted

## 2022-02-07 ENCOUNTER — TRANSITIONAL CARE MANAGEMENT (OUTPATIENT)
Dept: FAMILY MEDICINE CLINIC | Facility: CLINIC | Age: 52
End: 2022-02-07

## 2022-02-07 LAB
CHEST PAIN STATEMENT: NORMAL
MAX DIASTOLIC BP: 84 MMHG
MAX HEART RATE: 117 BPM
MAX PREDICTED HEART RATE: 168 BPM
MAX. SYSTOLIC BP: 144 MMHG
PROTOCOL NAME: NORMAL
REASON FOR TERMINATION: NORMAL
TARGET HR FORMULA: NORMAL
TEST INDICATION: NORMAL
TIME IN EXERCISE PHASE: NORMAL

## 2022-02-07 NOTE — UTILIZATION REVIEW
Notification of Discharge   This is a Notification of Discharge from our facility [address]  Please be advised that this patient has been discharge from our facility  Below you will find the admission and discharge date and time including the patients disposition  UTILIZATION REVIEW CONTACT:  SHAHEEN Crowley  Utilization   Network Utilization Review Department  Phone: [phone] x carefully listen to the prompts  All voicemails are confidential   Email: [email or web address]     PHYSICIAN ADVISORY SERVICES:  FOR [id number] REVIEW - MEDICAL NECESSITY DENIAL  Phone: [phone]  Fax: [phone]  Email: [email or web address]     PRESENTATION DATE: 2/2/2022  8:24 AM  OBERVATION ADMISSION DATE:   INPATIENT ADMISSION DATE: 2/2/22  2:08 PM   DISCHARGE DATE: 2/4/2022 11:52 AM  DISPOSITION: Home/Self Care Home/Self Care      IMPORTANT INFORMATION:  Send all requests for admission clinical reviews, approved or denied determinations and any other requests to dedicated fax number below belonging to the campus where the patient is receiving treatment   List of dedicated fax numbers:  [address] DENIALS (Administrative/Medical Necessity) [phone]   1000  [facility] (Maternity/NICU/Pediatrics) [phone]   Adelina Reyes [phone]   Sierra Fried [phone]   Hussain Hanna [phone]   [address] [phone]   [facility]  [phone]   [address] [place] [address], [facility]  [address] [facility] 1000 [facility] [phone]

## 2022-02-08 ENCOUNTER — OFFICE VISIT (OUTPATIENT)
Dept: CARDIOLOGY CLINIC | Facility: CLINIC | Age: 52
End: 2022-02-08
Payer: COMMERCIAL

## 2022-02-08 VITALS
HEIGHT: 60 IN | SYSTOLIC BLOOD PRESSURE: 136 MMHG | OXYGEN SATURATION: 98 % | DIASTOLIC BLOOD PRESSURE: 80 MMHG | BODY MASS INDEX: 33.81 KG/M2 | WEIGHT: 172.2 LBS | HEART RATE: 70 BPM

## 2022-02-08 DIAGNOSIS — R73.01 IFG (IMPAIRED FASTING GLUCOSE): ICD-10-CM

## 2022-02-08 DIAGNOSIS — E78.5 DYSLIPIDEMIA: ICD-10-CM

## 2022-02-08 DIAGNOSIS — I10 HYPERTENSION, UNSPECIFIED TYPE: Primary | ICD-10-CM

## 2022-02-08 DIAGNOSIS — R07.9 CHEST PAIN: ICD-10-CM

## 2022-02-08 DIAGNOSIS — E66.9 CLASS 1 OBESITY WITH SERIOUS COMORBIDITY AND BODY MASS INDEX (BMI) OF 33.0 TO 33.9 IN ADULT, UNSPECIFIED OBESITY TYPE: ICD-10-CM

## 2022-02-08 PROCEDURE — 99214 OFFICE O/P EST MOD 30 MIN: CPT | Performed by: NURSE PRACTITIONER

## 2022-02-08 PROCEDURE — 1036F TOBACCO NON-USER: CPT | Performed by: NURSE PRACTITIONER

## 2022-02-08 NOTE — PATIENT INSTRUCTIONS
2gm soidum low fat low choleserol,  Low carbohydrate avoid concentrated sweets eating fresh is best fresh fruit fresh vegetables lean protein     walk daily   weight loss

## 2022-02-08 NOTE — PROGRESS NOTES
Cardiology Follow Up    Sis Kaufman  1970  [id number]  [facility] [place]  [address] [id number] [facility]  [phone]    1  Chest pain  Ambulatory referral to Cardiology       Interval History:   Ms Dalton Head was admitted to [facility] on 2/02 - 2/04/22 with chest pain  Shan Raegan presented to the emergency room with complaints of left-sided chest pain radiating to left upper extremity worsening with exertion associated with shortness of breath  She had noted elevation of blood pressure over the past week 213/100  Troponins normal x3  EKG showed normal sinus rhythm  T-wave inversions in V2 to V4 unchanged from baseline  Cardiology consulted  Suggested escalation in medical therapy  Medications were adjusted  2/02/22  TTE:  Left ventricle cavity size normal LVEF 77% diastolic function normal   Right ventricular systolic function low normal   Atrial septum small patent foramen ovale could not be excluded on the study color Doppler  Aortic valve no evidence aortic stenosis, mild TR  Nuclear stress test EKG non diagnostic due to sub maximal stress  No perfusion defects patient developed non limiting anginal symptoms during exercise which resolved recovery  No LV perfusion defects detected  Renal Doppler negative for renal artery stenosis however 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%   Celiac and superior mesenteric arteries patent 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% stenosis identified at the proximal SMA     Vascular surgery consulted  No current intervention asymptomatic  Follow-up as an outpatient  Ms Dalton Head presents to the office for a recent hospitalization follow up visit  The office visit was conducted via the  line  She admits to left sided constant pressure, "like someone is pushing her" Home BP running [phone]  She is monitoring her sodium intake        HPI:  ASD secundum closure at the age of 30 years  Dyslipidemia 2/03/22 , , HDL 45,    IFG HgbA1C 6 0 on 12/04/21   Patient Active Problem List   Diagnosis    ASD secundum    Dyslipidemia    Hypertension, essential, benign    Menorrhagia    Plantar fasciitis, left    Valvular disease    Hypokalemia    Hypothyroidism    Vitamin D deficiency    Acute cystitis with hematuria    IFG (impaired fasting glucose)     Past Medical History:   Diagnosis Date    Cardiac disorder     Congenital heart defect     last assessed 3/3/15, resolved 3/3/15    Endometrial polyp      Social History     Socioeconomic History    Marital status: /Civil Union     Spouse name: Not on file    Number of children: Not on file    Years of education: Not on file    Highest education level: Not on file   Occupational History    Not on file   Tobacco Use    Smoking status: Never Smoker    Smokeless tobacco: Never Used   Vaping Use    Vaping Use: Never used   Substance and Sexual Activity    Alcohol use: No    Drug use: No    Sexual activity: Yes     Partners: Male     Birth control/protection: None     Comment:    Other Topics Concern    Not on file   Social History Narrative    Daily coffee consumption, (1 cup/day)     Sedentary lifestyle      Social Determinants of Health     Financial Resource Strain: Low Risk     Difficulty of Paying Living Expenses: Not hard at all   Food Insecurity: No Food Insecurity    Worried About Running Out of Food in the Last Year: Never true    Jeff of Food in the Last Year: Never true   Transportation Needs: No Transportation Needs    Lack of Transportation (Medical): No    Lack of Transportation (Non-Medical):  No   Physical Activity: Not on file   Stress: Not on file   Social Connections: Not on file   Intimate Partner Violence: Not on file   Housing Stability: Not on file      Family History   Problem Relation Age of Onset    Heart disease Mother     No Known Problems Father     Heart disease Maternal Grandmother     Coronary artery disease Family     No Known Problems Sister     No Known Problems Maternal Grandfather     No Known Problems Paternal Grandmother     No Known Problems Paternal Grandfather     No Known Problems Sister     No Known Problems Maternal Aunt     No Known Problems Maternal Aunt     No Known Problems Maternal Aunt     No Known Problems Maternal Aunt     No Known Problems Paternal Aunt     No Known Problems Paternal Aunt     No Known Problems Paternal Aunt     No Known Problems Paternal Aunt      Past Surgical History:   Procedure Laterality Date    CARDIAC SURGERY      valve repair      SECTION      x 2    ENDOMETRIAL BIOPSY      by suction     FOOT SURGERY Left     INSERTION OF INTRAUTERINE DEVICE (IUD)      REMOVAL OF INTRAUTERINE DEVICE (IUD)         Current Outpatient Medications:     amLODIPine (NORVASC) 5 mg tablet, Take 1 tablet (5 mg total) by mouth daily, Disp: 30 tablet, Rfl: 0    aspirin (ECOTRIN LOW STRENGTH) 81 mg EC tablet, Take 81 mg by mouth daily, Disp: , Rfl:     atorvastatin (LIPITOR) 40 mg tablet, Take 1 tablet (40 mg total) by mouth daily with dinner, Disp: 30 tablet, Rfl: 0    bisacodyl (DULCOLAX) 5 mg EC tablet, Take as directed as per written office instructions, Disp: 2 tablet, Rfl: 0    ergocalciferol (VITAMIN D2) 50,000 units, Take 1 capsule (50,000 Units total) by mouth once a week, Disp: 12 capsule, Rfl: 0    labetalol (NORMODYNE) 200 mg tablet, Take 1 tablet (200 mg total) by mouth every 12 (twelve) hours, Disp: 60 tablet, Rfl: 0    lisinopril (ZESTRIL) 20 mg tablet, Take 1 tablet (20 mg total) by mouth daily, Disp: 90 tablet, Rfl: 1    omega-3-acid ethyl esters (LOVAZA) 1 g capsule, Take 2 capsules (2 g total) by mouth 2 (two) times a day, Disp: 360 capsule, Rfl: 1    polyethylene glycol (GLYCOLAX) 17 GM/SCOOP powder, Take as directed as per written office instructions, Disp: 238 g, Rfl: 0  No Known Allergies    Labs:  Admission on 02/02/2022, Discharged on 02/04/2022   Component Date Value    WBC 02/02/2022 8 27     RBC 02/02/2022 5 17*    Hemoglobin 02/02/2022 14 5     Hematocrit 02/02/2022 42 2     MCV 02/02/2022 82     MCH 02/02/2022 28 0     MCHC 02/02/2022 34 4     RDW 02/02/2022 12 3     MPV 02/02/2022 10 9     Platelets 17/34/1895 286     nRBC 02/02/2022 0     Neutrophils Relative 02/02/2022 66     Immat GRANS % 02/02/2022 0     Lymphocytes Relative 02/02/2022 24     Monocytes Relative 02/02/2022 7     Eosinophils Relative 02/02/2022 2     Basophils Relative 02/02/2022 1     Neutrophils Absolute 02/02/2022 5 55     Immature Grans Absolute 02/02/2022 0 03     Lymphocytes Absolute 02/02/2022 1 95     Monocytes Absolute 02/02/2022 0 54     Eosinophils Absolute 02/02/2022 0 16     Basophils Absolute 02/02/2022 0 04     Sodium 02/02/2022 139     Potassium 02/02/2022 3 6     Chloride 02/02/2022 108     CO2 02/02/2022 23     ANION GAP 02/02/2022 8     BUN 02/02/2022 21     Creatinine 02/02/2022 0 63     Glucose 02/02/2022 104     Calcium 02/02/2022 9 9     AST 02/02/2022 19     ALT 02/02/2022 29     Alkaline Phosphatase 02/02/2022 75     Total Protein 02/02/2022 8 6*    Albumin 02/02/2022 4 1     Total Bilirubin 02/02/2022 0 28     eGFR 02/02/2022 103     TSH 3RD GENERATON 02/02/2022 2 910     hs TnI 0hr 02/02/2022 4     hs TnI 2hr 02/02/2022 3     Delta 2hr hsTnI 02/02/2022 -1     Ventricular Rate 02/02/2022 79     Atrial Rate 02/02/2022 79     NV Interval 02/02/2022 148     QRSD Interval 02/02/2022 98     QT Interval 02/02/2022 372     QTC Interval 02/02/2022 426     P Axis 02/02/2022 45     QRS Axis 02/02/2022 31     T Wave Axis 02/02/2022 32     hs TnI 4hr 02/02/2022 4     Delta 4hr hsTnI 02/02/2022 0     LA size 02/02/2022 3 1     LVPWd 02/02/2022 1 00     MV E' Tissue Velocity Se* 02/02/2022 9     IVSd 02/02/2022 4 04     LV DIASTOLIC VOLUME (MOD* 02/02/2022 102     LEFT VENTRICLE SYSTOLIC * 41/46/9721 36     Left ventricular stroke * 02/02/2022 65 00     A4C EF 02/02/2022 64     LVIDd 02/02/2022 4 70     LVIDS 02/02/2022 3 00     FS 02/02/2022 36     Asc Ao 02/02/2022 3     Ao root 02/02/2022 3 00     RVID d 02/02/2022 2 9     E wave deceleration time 02/02/2022 206     MV Peak E Fransico 02/02/2022 84     MV Peak A Fransico 02/02/2022 0 91     COCO A4C 02/02/2022 14 1     RAA A4C 02/02/2022 14 4     MV stenosis pressure 1/2* 02/02/2022 0     Ao asc z-score 02/02/2022 2 34     ZLVPWD 02/02/2022 2 37     ZLVIDS 02/02/2022 -0 83     ZIVSD 02/02/2022 1 39     LV EF 02/02/2022 60     Tricuspid annular plane * 02/02/2022 1 65     Sodium 02/03/2022 137     Potassium 02/03/2022 3 9     Chloride 02/03/2022 106     CO2 02/03/2022 26     ANION GAP 02/03/2022 5     BUN 02/03/2022 21     Creatinine 02/03/2022 0 68     Glucose 02/03/2022 113     Calcium 02/03/2022 9 4     eGFR 02/03/2022 100     Magnesium 02/03/2022 2 3     WBC 02/03/2022 6 75     RBC 02/03/2022 5 15*    Hemoglobin 02/03/2022 14 2     Hematocrit 02/03/2022 42 8     MCV 02/03/2022 83     MCH 02/03/2022 27 6     MCHC 02/03/2022 33 2     RDW 02/03/2022 12 4     Platelets 41/39/5122 273     MPV 02/03/2022 10 8     Cholesterol 02/03/2022 249*    Triglycerides 02/03/2022 180*    HDL, Direct 02/03/2022 45*    LDL Calculated 02/03/2022 168*    Non-HDL-Chol (CHOL-HDL) 02/03/2022 204     Cortisol - AM 02/03/2022 6 2     Baseline HR 02/03/2022 72     Baseline BP 02/03/2022 142/80     O2 sat rest 02/03/2022 98     Stress peak HR 02/03/2022 116     Post peak BP 02/03/2022 132     Rate Pressure Product 02/03/2022 15,312 0     O2 sat peak 02/03/2022 98     Recovery HR 02/03/2022 87     Recovery BP 02/03/2022 122/60     O2 sat recovery 02/03/2022 96     Target HR 02/03/2022 116     Percent HR 02/03/2022 69     Exercise duration (min) 02/03/2022 6     Exercise duration (sec) 02/03/2022 30     Estimated workload 02/03/2022 8 3     Angina Index 02/03/2022 1     Stress Stage Reached 02/03/2022 3 0     Max HR Percent 02/03/2022 69     Max HR 02/03/2022 143     Rest Nuclear Isotope Dose 02/03/2022 11 00     Stress Nuclear Isotope D* 02/03/2022 32 90     Stress/rest perfusion ra* 02/03/2022 0 88     EF (%) 02/03/2022 63     Protocol Name 02/03/2022 CANDELARIO     Time In Exercise Phase 02/03/2022 00:11:30     MAX  SYSTOLIC BP 82/89/0711 349     Max Diastolic Bp 68/90/4255 84     Max Heart Rate 02/03/2022 117     Max Predicted Heart Rate 02/03/2022 168     Reason for Termination 02/03/2022 Protocol Complete     Test Indication 02/03/2022 Screening for CAD     Target Hr Formular 02/03/2022 (220 - Age)*100%     Chest Pain Statement 02/03/2022 limiting      Imaging: XR chest 2 views    Result Date: 2/2/2022  Narrative: CHEST INDICATION:   chest pain  COMPARISON:  Chest radiograph and CT from 10/29/2015  EXAM PERFORMED/VIEWS:  XR CHEST PA & LATERAL FINDINGS: Normal heart size, CABG  The lungs are clear  No pneumothorax or pleural effusion  Osseous structures appear within normal limits for patient age  Impression: No acute cardiopulmonary disease  Workstation performed: [id number]     CT head without contrast    Result Date: 2/2/2022  Narrative: CT BRAIN - WITHOUT CONTRAST INDICATION:   Headache, new or worsening (Age >= 50y) headache, htn  COMPARISON:  CT brain 10/29/2015  TECHNIQUE:  CT examination of the brain was performed  In addition to axial images, sagittal and coronal 2D reformatted images were created and submitted for interpretation  Radiation dose length product (DLP) for this visit:  860 78 mGy-cm   This examination, like all CT scans performed in the [facility], was performed utilizing techniques to minimize radiation dose exposure, including the use of iterative  reconstruction and automated exposure control    IMAGE QUALITY: Diagnostic  FINDINGS: PARENCHYMA:  No intracranial mass, mass effect or midline shift  No CT signs of acute infarction  No acute parenchymal hemorrhage  VENTRICLES AND EXTRA-AXIAL SPACES:  Normal for the patient's age  VISUALIZED ORBITS AND PARANASAL SINUSES:  Unremarkable  CALVARIUM AND EXTRACRANIAL SOFT TISSUES:  Normal      Impression: No acute intracranial abnormality  Workstation performed: [id number]     NM myocardial perfusion spect (stress and/or rest)    Result Date: 2/3/2022  Narrative: Paula Simpson  The test initially was started as exercise myocardial perfusion scan but was converted to regadenosen nuclear stress test due to inability of the patient to continue with exercise due to fatigue and shortness of breath before achieving target maximal stress exercise    Stress ECG: The ECG was not diagnostic due to submaximal stress    Stress Function: Left ventricular function post-stress is normal  Post-stress ejection fraction is 63 %    Perfusion: There are no perfusion defects    Stress Combined Conclusion: althouth patient developed non-limiting anginal symptoms during exercise which resolved in recovery, there were no left ventricular perfusion defects detected    Normal lexiscan myoview stress     Stress strip    Result Date: 2/7/2022  Narrative: Confirmed by Devonna Olszewski ([id number]),  Alissa Hart ([id number]) on 2/7/2022 8:34:01 AM    VAS renal artery complete    Result Date: 2/3/2022  Narrative:  [facility] REPORT CLINICAL: Indications: Patient presents to evaluate the renal arteries secondary to uncontrolled HTN  PT admitted to hospital with hypertensive emergency  Patient is currently on 2 medications for blood pressure  Operative History: Cardiac valve repair Risk Factors The patient has history of HTN, impaired fasting glucose, and congenital heart valve defect  She is a non-smoker  Height:  60 inches  Weight:  170 lbs  Clinical Brachial BP: Right:  IV site  Left:  146/80 mm Hg  FINDINGS:  Unilateral  Impression  PSV (cm/s)  EDV (cm/s)    RI  Sup-Zohra Ao                      91          20  0 78  Celiac                         128          31        Prox  SMA   >70%               368          64         Right          Impression  PSV (cm/s)  EDV (cm/s)   RAR    RI  Kidney (cm)  Ostial Renal                       82          28  0 90                     Prox Renal     1 - 59%            106          48  0 92  0 63               Mid Renal                         210          72  2 30  0 66               Dist Renal                        173          55  1 90  1 00               Celiac Artery                      60          19        0 68               Kidney                                                               10 91   Left           Impression  PSV (cm/s)  EDV (cm/s)   RAR    RI  Kidney (cm)  Ostial Renal                      110          38  1 21                     Prox Renal     1 - 59%            106          48  1 16                     Mid Renal                          94          39  1 03                     Dist Renal                        116          45  1 27                     Celiac Artery                      29          10        0 66               Kidney                                                               10 33     CONCLUSION:  Impression The abdominal aorta is widely patent and normal caliber  RIGHT RENAL: No evidence of significant arterial occlusive disease in the main tortuous renal artery  Patent renal vein Renovascular resistive index of 0 68  Renal/Aorta Ratio: 2 3  The Kidney measures 10 91 cm X 6 14 cm  LEFT RENAL: No evidence of significant arterial occlusive disease in the main renal artery  Patent renal vein Renovascular resistive index of 0 66  Renal/Aorta Ratio:  1 27  The Kidney measures 10 33 cm X 5 54 cm  MESENTERIC: Celiac and superior mesenteric arteries are patent  A > 70% stenosis was identified at the proximal SMA    Technical findings faxed to chart  SIGNATURE: Electronically Signed by: Cedric Santos MD on 2022-02-03 05:12:36 PM    Mammo screening bilateral w 3d & cad    Result Date: 1/31/2022  Narrative: DIAGNOSIS: Screening mammogram for breast cancer TECHNIQUE: Digital screening mammography was performed  Computer Aided Detection (CAD) analyzed all applicable images  COMPARISONS: Prior breast imaging dated: 02/19/2020, 02/03/2020, 11/19/2018, 10/05/2017, and 02/16/2015 RELEVANT HISTORY: Family Breast Cancer History: No known family history of breast cancer  Family Medical History: No known relevant family medical history  Personal History: No known relevant hormone history  No known relevant surgical history  No known relevant medical history  The patient is scheduled in a reminder system for screening mammography  8-10% of cancers will be missed on mammography  Management of a palpable abnormality must be based on clinical grounds  Patients will be notified of their results via letter from our facility  Accredited by [employer] of Radiology and FDA  RISK ASSESSMENT: 5 Year Tyrer-Cuzick: 0 51 % 10 Year Tyrer-Cuzick: 1 1 % Lifetime Tyrer-Cuzick: 4 46 % TISSUE DENSITY: There are scattered areas of fibroglandular density  INDICATION: Bria Michel is a 45 y o  female presenting for screening mammography  FINDINGS: There are no suspicious masses, grouped microcalcifications or areas of architectural distortion  The skin and nipple areolar complex are unremarkable  Impression: No mammographic evidence of malignancy  View No significant change ASSESSMENT/BI-RADS CATEGORY: Left: 1 - Negative Right: 1 - Negative Overall: 1 - Negative RECOMMENDATION:      - Routine screening mammogram in 1 year for both breasts  Workstation ID: [id number]     Echo complete w/ contrast if indicated    Result Date: 2/2/2022  Narrative: [facility]  Left Ventricle: Left ventricular cavity size is normal  Wall thickness is mildly increased   The left ventricular ejection fraction is 60%  Systolic function is normal  Wall motion cannot be accurately assessed  Diastolic function is normal    Right Ventricle: Systolic function is low normal  Abnormal tricuspid annular plane systolic excursion (TAPSE) < 1 7 cm    Atrial Septum: A small patent foramen ovale could not be excluded on this study on color doppler  Consider agitated saline study to rule out shunt at the atrial level  Angi Debbi  Aortic Valve: There is no evidence of stenosis    Tricuspid Valve: There is mild regurgitation    Technically difficult study with poor image quality  Review of Systems:  Review of Systems   Cardiovascular: Positive for chest pain  All other systems reviewed and are negative  Physical Exam:  Physical Exam  Vitals reviewed  Constitutional:       Appearance: She is obese  HENT:      Head: Normocephalic  Eyes:      Pupils: Pupils are equal, round, and reactive to light  Cardiovascular:      Rate and Rhythm: Normal rate and regular rhythm  Pulses: Normal pulses  Heart sounds: Normal heart sounds  Pulmonary:      Effort: Pulmonary effort is normal       Breath sounds: Normal breath sounds  Abdominal:      General: Bowel sounds are normal       Palpations: Abdomen is soft  Musculoskeletal:         General: Normal range of motion  Cervical back: Normal range of motion and neck supple  Right lower leg: No edema  Left lower leg: No edema  Skin:     General: Skin is warm and dry  Capillary Refill: Capillary refill takes less than 2 seconds  Neurological:      General: No focal deficit present  Mental Status: She is alert and oriented to person, place, and time  Psychiatric:         Mood and Affect: Mood normal          Behavior: Behavior normal          Discussion/Summary:  1   Hypertension- RUE sitting 136/57  Home systolic blood pressure [id number],  Continue on labetalol 200 mg q 12 hours, amlodipine 5 mg daily, lisinopril 20 mg daily instructed on a 2 g sodium diet eating fresh is best daily exercise and weight loss   2  Atypical chest pain constant like someone is pushing on her, 2/03/22 Nuclear stress test  ECG non diagnostic due to submaximal stress, perfusion no perfusion defect, TTE LVEF  52% systolic function normal wall motion could not be accurately assess diastolic function normal  Continue Lipitor 40 mg daily, aspirin 81 mg daily  3  Dyslipidemia 2/03/22 , , HDL 45,  - continue on Lipitor 40mg daily, Lovaza 2gm BID,  daily exercise,   Instructed on a heart healthy diet eating fresh fruits vegetables lean proteins and daily exercise  follow up lipid panel in 6 months  4  IFG HgbA1C 6 0 on 12/04/21 limit carbohydrates, avoid concentrated sweets, follow up with PCP   5   Obesity BMI 33 20 calorie control, daily exercise for weight loss

## 2022-02-09 LAB
ALDOST SERPL-MCNC: 7.6 NG/DL (ref 0–30)
ALDOST/RENIN PLAS-RTO: 3.9 {RATIO} (ref 0–30)
RENIN PLAS-CCNC: 1.94 NG/ML/HR (ref 0.17–5.38)

## 2022-02-10 ENCOUNTER — OFFICE VISIT (OUTPATIENT)
Dept: FAMILY MEDICINE CLINIC | Facility: CLINIC | Age: 52
End: 2022-02-10
Payer: COMMERCIAL

## 2022-02-10 VITALS
DIASTOLIC BLOOD PRESSURE: 62 MMHG | RESPIRATION RATE: 18 BRPM | OXYGEN SATURATION: 98 % | SYSTOLIC BLOOD PRESSURE: 130 MMHG | HEART RATE: 75 BPM | TEMPERATURE: 99 F | WEIGHT: 171 LBS | HEIGHT: 60 IN | BODY MASS INDEX: 33.57 KG/M2

## 2022-02-10 DIAGNOSIS — R07.89 OTHER CHEST PAIN: ICD-10-CM

## 2022-02-10 DIAGNOSIS — I10 HYPERTENSION, ESSENTIAL, BENIGN: Primary | ICD-10-CM

## 2022-02-10 DIAGNOSIS — Z09 HOSPITAL DISCHARGE FOLLOW-UP: ICD-10-CM

## 2022-02-10 PROCEDURE — 1111F DSCHRG MED/CURRENT MED MERGE: CPT | Performed by: FAMILY MEDICINE

## 2022-02-10 PROCEDURE — 99496 TRANSJ CARE MGMT HIGH F2F 7D: CPT | Performed by: FAMILY MEDICINE

## 2022-02-10 PROCEDURE — 3008F BODY MASS INDEX DOCD: CPT | Performed by: NURSE PRACTITIONER

## 2022-02-10 NOTE — PROGRESS NOTES
Assessment/Plan:     Hypertension, essential, benign  Hospital discharge summary a, labs, imaging all reviewed  Patient was admitted to the hospital for chest pain and hypertensive urgency  Workup was negative for ischemia  Patient was treated for hypertensive urgency  Started on amlodipine 5 mg daily and a labetalol 200 mg b i d   Continued on lisinopril 20 mg daily  Cardiology note reviewed  Continue on same regimen  Follow-up in 3 months with Cardiology  Advised patient that if she does experience a any additional chest pain or have elevated blood pressure to come to office for further evaluation  Follow-up with PCP in May  Diagnoses and all orders for this visit:    Hypertension, essential, benign    Other chest pain  Comments:  Chest pain has resolved  No recurrence of symptoms    Hospital discharge follow-up         Subjective:     Patient ID: Chano Morocho is a 45 y o  female  HPI    [id number]    Patient presents for TCM  Recently admitted to the hospital on 02/02 and discharged on 02/04 for chest pain  Cardiac workup with stress test not indicative of ischemic disease  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  Advised to follow up a as needed for incidental findings  Has outpatient follow-up with Cardiology    Had labetalol 200 milligrams b i d , and amlodipine 5 milligrams daily added  Currently on lisinopril 20 milligrams daily  Seen by cardiology on 02/08/2022  Advised patient to continue with current hypertensive medication regiment  Follow-up in 3 months    Today, patient reports no significant side effects problems  Would like a work note to return back to work tomorrow  Review of Systems   Constitutional: Negative for chills and fever  HENT: Negative for congestion  Eyes: Negative for pain and visual disturbance  Respiratory: Negative for cough and shortness of breath      Cardiovascular: Negative for chest pain and palpitations  Gastrointestinal: Negative for abdominal pain, diarrhea, nausea and vomiting  Genitourinary: Negative for dysuria and hematuria  Musculoskeletal: Negative for myalgias  Skin: Negative for rash  Neurological: Negative for dizziness and headaches  Objective:     Physical Exam  Vitals and nursing note reviewed  Constitutional:       General: She is not in acute distress  HENT:      Head: Normocephalic and atraumatic  Eyes:      Conjunctiva/sclera: Conjunctivae normal    Cardiovascular:      Rate and Rhythm: Normal rate and regular rhythm  Pulses: Normal pulses  Heart sounds: No murmur heard  Pulmonary:      Effort: Pulmonary effort is normal  No respiratory distress  Breath sounds: No wheezing, rhonchi or rales  Musculoskeletal:         General: No swelling  Lymphadenopathy:      Cervical: No cervical adenopathy  Neurological:      Mental Status: She is alert  Psychiatric:         Mood and Affect: Mood normal            Vitals:    02/10/22 0800   BP: 130/62   BP Location: Left arm   Patient Position: Sitting   Cuff Size: Adult   Pulse: 75   Resp: 18   Temp: 99 °F (37 2 °C)   TempSrc: Tympanic   SpO2: 98%   Weight: 77 6 kg (171 lb)   Height: 5' (1 524 m)       Transitional Care Management Review:  Daniel Medina is a 45 y o  female here for TCM follow up  During the TCM phone call patient stated:    TCM Call (since 1/10/2022)     Date and time call was made  2/7/2022 10:37 AM    Hospital care reviewed  Records reviewed        Patient was hospitialized at  [facility]        Date of Admission  02/02/22    Date of discharge  02/04/22    Diagnosis  Chest pain    Disposition  Home    Were the patients medications reviewed and updated  Yes    Current Symptoms  None      TCM Call (since 1/10/2022)     Post hospital issues  None    Should patient be enrolled in anticoag monitoring? No    Scheduled for follow up?   Yes    Patients specialists  Cardiologist    Did you obtain your prescribed medications  Yes    Do you need help managing your prescriptions or medications  No    Is transportation to your appointment needed  No    I have advised the patient to call PCP with any new or worsening symptoms  R Rosalina Garcia 79 or Significiant other    Support System  None    Are you recieving any outpatient services  No    Are you recieving home care services  No    Are you using any community resources  No    Current waiver services  No    Have you fallen in the last 12 months  No    Interperter language line needed  Yes    Counseling  Patient          This note has been constructed using a voice recognition system  There may be translation, syntax, or grammatical errors  If you have an questions, please contact the dictating provider      Hsin Dat Cindie Frankel,

## 2022-02-10 NOTE — LETTER
February 10, 2022     Patient: Jake Ribera   YOB: 1970   Date of Visit: 2/10/2022       To Whom it May Concern:    Charlie Escobar is under my professional care  She was seen in my office on 2/10/2022  Please excuse patient from work from 02/02/2022 until 02/10/2022 as patient was sick and being treated for medical problems  She may return to work on 02/11/2022       If you have any questions or concerns, please don't hesitate to call           Sincerely,          Tiff Mosquera DO        CC: No Recipients

## 2022-02-10 NOTE — ASSESSMENT & PLAN NOTE
Hospital discharge summary a, labs, imaging all reviewed  Patient was admitted to the hospital for chest pain and hypertensive urgency  Workup was negative for ischemia  Patient was treated for hypertensive urgency  Started on amlodipine 5 mg daily and a labetalol 200 mg b i d   Continued on lisinopril 20 mg daily  Cardiology note reviewed  Continue on same regimen  Follow-up in 3 months with Cardiology  Advised patient that if she does experience a any additional chest pain or have elevated blood pressure to come to office for further evaluation  Follow-up with PCP in May

## 2022-02-23 ENCOUNTER — TELEPHONE (OUTPATIENT)
Dept: GASTROENTEROLOGY | Facility: HOSPITAL | Age: 52
End: 2022-02-23

## 2022-02-24 ENCOUNTER — ANESTHESIA (OUTPATIENT)
Dept: GASTROENTEROLOGY | Facility: HOSPITAL | Age: 52
End: 2022-02-24

## 2022-02-24 ENCOUNTER — HOSPITAL ENCOUNTER (OUTPATIENT)
Dept: GASTROENTEROLOGY | Facility: HOSPITAL | Age: 52
Setting detail: OUTPATIENT SURGERY
Discharge: HOME | End: 2022-02-24
Attending: INTERNAL MEDICINE | Admitting: INTERNAL MEDICINE
Payer: COMMERCIAL

## 2022-02-24 ENCOUNTER — ANESTHESIA EVENT (OUTPATIENT)
Dept: GASTROENTEROLOGY | Facility: HOSPITAL | Age: 52
End: 2022-02-24

## 2022-02-24 VITALS
RESPIRATION RATE: 17 BRPM | DIASTOLIC BLOOD PRESSURE: 67 MMHG | OXYGEN SATURATION: 99 % | HEART RATE: 58 BPM | SYSTOLIC BLOOD PRESSURE: 158 MMHG | TEMPERATURE: 97.7 F

## 2022-02-24 DIAGNOSIS — Z12.11 COLON CANCER SCREENING: ICD-10-CM

## 2022-02-24 PROBLEM — E66.811 OBESITY (BMI 30.0-34.9): Status: ACTIVE | Noted: 2018-04-10

## 2022-02-24 PROCEDURE — G0121 COLON CA SCRN NOT HI RSK IND: HCPCS | Performed by: INTERNAL MEDICINE

## 2022-02-24 RX ORDER — SODIUM CHLORIDE 9 MG/ML
INJECTION, SOLUTION INTRAVENOUS CONTINUOUS PRN
Status: DISCONTINUED | OUTPATIENT
Start: 2022-02-24 | End: 2022-02-24

## 2022-02-24 RX ORDER — LABETALOL 20 MG/4 ML (5 MG/ML) INTRAVENOUS SYRINGE
AS NEEDED
Status: DISCONTINUED | OUTPATIENT
Start: 2022-02-24 | End: 2022-02-24

## 2022-02-24 RX ORDER — PROPOFOL 10 MG/ML
INJECTION, EMULSION INTRAVENOUS AS NEEDED
Status: DISCONTINUED | OUTPATIENT
Start: 2022-02-24 | End: 2022-02-24

## 2022-02-24 RX ADMIN — PROPOFOL 100 MG: 10 INJECTION, EMULSION INTRAVENOUS at 12:59

## 2022-02-24 RX ADMIN — PROPOFOL 50 MG: 10 INJECTION, EMULSION INTRAVENOUS at 13:08

## 2022-02-24 RX ADMIN — LABETALOL HYDROCHLORIDE 5 MG: 5 INJECTION, SOLUTION INTRAVENOUS at 13:10

## 2022-02-24 RX ADMIN — PROPOFOL 50 MG: 10 INJECTION, EMULSION INTRAVENOUS at 13:04

## 2022-02-24 RX ADMIN — LABETALOL HYDROCHLORIDE 10 MG: 5 INJECTION, SOLUTION INTRAVENOUS at 13:07

## 2022-02-24 RX ADMIN — SODIUM CHLORIDE: 9 INJECTION, SOLUTION INTRAVENOUS at 12:50

## 2022-02-24 NOTE — ANESTHESIA PREPROCEDURE EVALUATION
Procedure:  COLONOSCOPY    Relevant Problems   CARDIO   (+) Hypertension, essential, benign      ENDO   (+) Hypothyroidism      Other   (+) ASD secundum   (+) Dyslipidemia   (+) IFG (impaired fasting glucose)     TTE 2/2/22       Left Ventricle: Left ventricular cavity size is normal  Wall thickness is mildly increased  The left ventricular ejection fraction is 60%  Systolic function is normal  Wall motion cannot be accurately assessed  Diastolic function is normal     Right Ventricle: Systolic function is low normal  Abnormal tricuspid annular plane systolic excursion (TAPSE) < 1 7 cm    Atrial Septum: A small patent foramen ovale could not be excluded on this study on color doppler  Consider agitated saline study to rule out shunt at the atrial level  Opal Gandhi  Aortic Valve: There is no evidence of stenosis    Tricuspid Valve: There is mild regurgitation    Technically difficult study with poor image quality       Physical Exam    Airway    Mallampati score: II  TM Distance: >3 FB  Neck ROM: full     Dental       Cardiovascular      Pulmonary      Other Findings        Anesthesia Plan  ASA Score- 2     Anesthesia Type- IV sedation with anesthesia with ASA Monitors           Additional Monitors:   Airway Plan:     Comment: Recent labs personally reviewed:  Lab Results       Component                Value               Date                       WBC                      6 75                02/03/2022                 HGB                      14 2                02/03/2022                 PLT                      273                 02/03/2022            Lab Results       Component                Value               Date                       NA                       140                 10/30/2015                 K                        3 9                 02/03/2022                 BUN                      21                  02/03/2022                 CREATININE               0 68                02/03/2022 GLUCOSE                  104                 08/15/2017            Lab Results       Component                Value               Date                       PTT                      28                  10/29/2015             Lab Results       Component                Value               Date                       INR                      1 00                10/29/2015              Blood type     I, Yani Nieto MD, have personally seen and evaluated the patient prior to anesthetic care  I have reviewed the pre-anesthetic record, medical history, allergies, medications and any other medical records if appropriate to the anesthetic care  If a CRNA is involved in the case, I have reviewed the CRNA assessment, if present, and agree  Patient consented for IV Sedation, general anesthesia as back up  Discussed risks of aspiration, IV infiltration, indications for conversion to general anesthesia  All questions and concerns addressed          Plan Factors-Exercise tolerance (METS): >4 METS  Chart reviewed  Patient summary reviewed  Patient is not a current smoker  Patient did not smoke on day of surgery  Obstructive sleep apnea risk education given perioperatively  Induction- intravenous  Postoperative Plan-     Informed Consent- Anesthetic plan and risks discussed with patient  I personally reviewed this patient with the CRNA  Discussed and agreed on the Anesthesia Plan with the CRNA  [place]

## 2022-02-24 NOTE — ANESTHESIA POSTPROCEDURE EVALUATION
Post-Op Assessment Note    CV Status:  Stable  Pain Score: 0    Pain management: adequate     Mental Status:  Alert and awake   Hydration Status:  Euvolemic   PONV Controlled:  Controlled   Airway Patency:  Patent      Post Op Vitals Reviewed: Yes      Staff: CRNA, Anesthesiologist         No complications documented      BP   156/74   Temp  97 7   Pulse  62   Resp   14   SpO2   99

## 2022-02-24 NOTE — H&P
History and Physical -  [facility]  Chano Morocho 45 y o  female MRN: [id number]                  HPI: Chano Morocho is a 45y o  year old female who presents for colonoscopy for cancer screening  REVIEW OF SYSTEMS: Per the HPI, and otherwise unremarkable  Historical Information   Past Medical History:   Diagnosis Date    Cardiac disorder     Congenital heart defect     last assessed 3/3/15, resolved 3/3/15    Endometrial polyp      Past Surgical History:   Procedure Laterality Date    CARDIAC SURGERY      valve repair      SECTION      x 2    ENDOMETRIAL BIOPSY      by suction     FOOT SURGERY Left     INSERTION OF INTRAUTERINE DEVICE (IUD)      REMOVAL OF INTRAUTERINE DEVICE (IUD)       Social History   Social History     Substance and Sexual Activity   Alcohol Use No     Social History     Substance and Sexual Activity   Drug Use No     Social History     Tobacco Use   Smoking Status Never Smoker   Smokeless Tobacco Never Used     Family History   Problem Relation Age of Onset    Heart disease Mother     No Known Problems Father     Heart disease Maternal Grandmother     Coronary artery disease Family     No Known Problems Sister     No Known Problems Maternal Grandfather     No Known Problems Paternal Grandmother     No Known Problems Paternal Grandfather     No Known Problems Sister     No Known Problems Maternal Aunt     No Known Problems Maternal Aunt     No Known Problems Maternal Aunt     No Known Problems Maternal Aunt     No Known Problems Paternal Aunt     No Known Problems Paternal Aunt     No Known Problems Paternal Aunt     No Known Problems Paternal Aunt        Meds/Allergies     (Not in a hospital admission)      No Known Allergies    Objective     Blood pressure 134/78, pulse 64, temperature (!) 96 8 °F (36 °C), temperature source Tympanic, resp   rate 17, last menstrual period 2019, SpO2 99 %, not currently breastfeeding  PHYSICAL EXAM    Gen: NAD  CV: RRR  CHEST: Clear  ABD: soft, NT/ND  EXT: no edema      ASSESSMENT/PLAN:   Lizette Berg is a 45y o  year old female who presents for colonoscopy for cancer screening  The patient is stable and optimized for the procedure, we reviewed risk and benefits  Risk include but not limited to infection, bleeding, perforation and missing a lesion

## 2022-03-17 DIAGNOSIS — R07.9 CHEST PAIN: ICD-10-CM

## 2022-03-17 DIAGNOSIS — I10 HYPERTENSION, ESSENTIAL, BENIGN: ICD-10-CM

## 2022-03-21 RX ORDER — LISINOPRIL 20 MG/1
20 TABLET ORAL DAILY
Qty: 90 TABLET | Refills: 1 | Status: SHIPPED | OUTPATIENT
Start: 2022-03-21 | End: 2022-08-08 | Stop reason: SDUPTHER

## 2022-03-21 RX ORDER — ATORVASTATIN CALCIUM 40 MG/1
40 TABLET, FILM COATED ORAL
Qty: 30 TABLET | Refills: 0 | Status: SHIPPED | OUTPATIENT
Start: 2022-03-21 | End: 2022-04-15

## 2022-03-21 RX ORDER — LABETALOL 200 MG/1
200 TABLET, FILM COATED ORAL EVERY 12 HOURS SCHEDULED
Qty: 60 TABLET | Refills: 0 | Status: SHIPPED | OUTPATIENT
Start: 2022-03-21 | End: 2022-04-15

## 2022-03-21 RX ORDER — AMLODIPINE BESYLATE 5 MG/1
5 TABLET ORAL DAILY
Qty: 30 TABLET | Refills: 0 | Status: SHIPPED | OUTPATIENT
Start: 2022-03-21 | End: 2022-04-15

## 2022-04-15 DIAGNOSIS — R07.9 CHEST PAIN: ICD-10-CM

## 2022-04-15 RX ORDER — LABETALOL 200 MG/1
TABLET, FILM COATED ORAL
Qty: 60 TABLET | Refills: 0 | Status: SHIPPED | OUTPATIENT
Start: 2022-04-15 | End: 2022-05-15

## 2022-04-15 RX ORDER — ATORVASTATIN CALCIUM 40 MG/1
TABLET, FILM COATED ORAL
Qty: 30 TABLET | Refills: 0 | Status: SHIPPED | OUTPATIENT
Start: 2022-04-15 | End: 2022-05-15

## 2022-04-15 RX ORDER — AMLODIPINE BESYLATE 5 MG/1
5 TABLET ORAL DAILY
Qty: 30 TABLET | Refills: 0 | Status: SHIPPED | OUTPATIENT
Start: 2022-04-15 | End: 2022-05-15

## 2022-05-05 ENCOUNTER — OFFICE VISIT (OUTPATIENT)
Dept: FAMILY MEDICINE CLINIC | Facility: CLINIC | Age: 52
End: 2022-05-05
Payer: COMMERCIAL

## 2022-05-05 VITALS
OXYGEN SATURATION: 98 % | BODY MASS INDEX: 33.04 KG/M2 | DIASTOLIC BLOOD PRESSURE: 80 MMHG | HEART RATE: 77 BPM | RESPIRATION RATE: 18 BRPM | TEMPERATURE: 97 F | WEIGHT: 175 LBS | SYSTOLIC BLOOD PRESSURE: 142 MMHG | HEIGHT: 61 IN

## 2022-05-05 DIAGNOSIS — J06.9 VIRAL URI WITH COUGH: Primary | ICD-10-CM

## 2022-05-05 DIAGNOSIS — G56.02 CARPAL TUNNEL SYNDROME ON LEFT: ICD-10-CM

## 2022-05-05 PROBLEM — N30.01 ACUTE CYSTITIS WITH HEMATURIA: Status: RESOLVED | Noted: 2021-04-28 | Resolved: 2022-05-05

## 2022-05-05 PROBLEM — M72.2 PLANTAR FASCIITIS, LEFT: Status: RESOLVED | Noted: 2017-07-24 | Resolved: 2022-05-05

## 2022-05-05 PROCEDURE — 99214 OFFICE O/P EST MOD 30 MIN: CPT | Performed by: FAMILY MEDICINE

## 2022-05-05 PROCEDURE — 87636 SARSCOV2 & INF A&B AMP PRB: CPT | Performed by: FAMILY MEDICINE

## 2022-05-05 RX ORDER — BENZONATATE 100 MG/1
100 CAPSULE ORAL 3 TIMES DAILY PRN
Qty: 20 CAPSULE | Refills: 0 | Status: SHIPPED | OUTPATIENT
Start: 2022-05-05 | End: 2022-05-17

## 2022-05-05 NOTE — PROGRESS NOTES
Assessment/Plan:    No problem-specific Assessment & Plan notes found for this encounter  Diagnoses and all orders for this visit:    Viral URI with cough  Comments:  Continue robitussin and add tesselon perles  She will call if not experiencing relief  Covid tested today  Orders:  -     benzonatate (TESSALON PERLES) 100 mg capsule; Take 1 capsule (100 mg total) by mouth 3 (three) times a day as needed for cough  -     Covid/Flu- Office Collect    Carpal tunnel syndrome on left  Comments:  Use a wrist splint at night  Orders:  -     Cock Up Wrist Splint        Subjective: cough     Patient ID: Beka Ruiz is a 47 y o  female  HPI  Pt developed a cough last week  The cough is persistent and dry  Denies fevers, chills, body aches, sore throat, runny nose, congestion  No sick contacts  Robitussin has not been helping  Denies new meds  She has dev pain in the back from coughing  Pt is also complaining of left wrist pain and numbness  The following portions of the patient's history were reviewed and updated as appropriate: allergies, current medications, past family history, past medical history, past social history, past surgical history and problem list     Review of Systems   Constitutional: Negative for fever and unexpected weight change  HENT: Negative for ear pain, sore throat and trouble swallowing  Eyes: Negative for pain and visual disturbance  Respiratory: Positive for cough  Negative for chest tightness, shortness of breath and wheezing  Cardiovascular: Negative for chest pain  Gastrointestinal: Negative for abdominal distention, abdominal pain, blood in stool, constipation, diarrhea, nausea and vomiting  Endocrine: Negative for polydipsia and polyuria  Genitourinary: Negative for dysuria and hematuria  Musculoskeletal: Negative for back pain and myalgias  Skin: Negative for rash  Neurological: Negative for syncope and headaches     Psychiatric/Behavioral: Negative for suicidal ideas  PHQ-2/9 Depression Screening             Objective:      /80 (BP Location: Left arm, Patient Position: Sitting, Cuff Size: Adult)   Pulse 77   Temp (!) 97 °F (36 1 °C) (Tympanic)   Resp 18   Ht 5' 1" (1 549 m)   Wt 79 4 kg (175 lb)   LMP 02/01/2019 (Approximate)   SpO2 98%   BMI 33 07 kg/m²          Physical Exam  Constitutional:       Appearance: She is well-developed  HENT:      Head: Normocephalic and atraumatic  Right Ear: External ear normal       Left Ear: External ear normal       Mouth/Throat:      Pharynx: No oropharyngeal exudate  Eyes:      General: No scleral icterus  Conjunctiva/sclera: Conjunctivae normal       Pupils: Pupils are equal, round, and reactive to light  Cardiovascular:      Rate and Rhythm: Normal rate and regular rhythm  Heart sounds: No murmur heard  No friction rub  No gallop  Pulmonary:      Effort: Pulmonary effort is normal  No respiratory distress  Breath sounds: Normal breath sounds  No wheezing or rales  Abdominal:      General: Bowel sounds are normal  There is no distension  Palpations: Abdomen is soft  There is no mass  Tenderness: There is no abdominal tenderness  There is no rebound  Musculoskeletal:         General: Normal range of motion  Cervical back: Normal range of motion and neck supple  Comments: Phalen pos   Skin:     General: Skin is warm and dry  Neurological:      Mental Status: She is alert and oriented to person, place, and time

## 2022-05-06 LAB
FLUAV RNA RESP QL NAA+PROBE: NEGATIVE
FLUBV RNA RESP QL NAA+PROBE: NEGATIVE
SARS-COV-2 RNA RESP QL NAA+PROBE: NEGATIVE

## 2022-05-15 DIAGNOSIS — R07.9 CHEST PAIN: ICD-10-CM

## 2022-05-15 RX ORDER — AMLODIPINE BESYLATE 5 MG/1
5 TABLET ORAL DAILY
Qty: 30 TABLET | Refills: 0 | Status: SHIPPED | OUTPATIENT
Start: 2022-05-15 | End: 2022-08-08 | Stop reason: SDUPTHER

## 2022-05-15 RX ORDER — LABETALOL 200 MG/1
TABLET, FILM COATED ORAL
Qty: 60 TABLET | Refills: 0 | Status: SHIPPED | OUTPATIENT
Start: 2022-05-15 | End: 2022-06-15

## 2022-05-15 RX ORDER — ATORVASTATIN CALCIUM 40 MG/1
TABLET, FILM COATED ORAL
Qty: 30 TABLET | Refills: 0 | Status: SHIPPED | OUTPATIENT
Start: 2022-05-15 | End: 2022-06-15

## 2022-05-16 NOTE — PROGRESS NOTES
Cardiology Follow Up    Glenn Fontenot  1970  [id number]  [facility]  [facility] [address]  [address]  [phone]    1  Primary hypertension     2  Dyslipidemia     3  Obesity (BMI 30-39  9)  Ambulatory Referral to Weight Management       Interval History:   Ms Christa Carter was admitted to [facility] on 2/02 - 2/04/22 with chest pain  Janie Lyles presented to the emergency room with complaints of left-sided chest pain radiating to left upper extremity worsening with exertion associated with shortness of breath  She had noted elevation of blood pressure over the past week 213/100  Troponins normal x3  EKG showed normal sinus rhythm  T-wave inversions in V2 to V4 unchanged from baseline  Cardiology consulted  Suggested escalation in medical therapy  Medications were adjusted  2/02/22  TTE:  Left ventricle cavity size normal LVEF 64% diastolic function normal   Right ventricular systolic function low normal   Atrial septum small patent foramen ovale could not be excluded on the study color Doppler  Aortic valve no evidence aortic stenosis, mild TR  Nuclear stress test EKG non diagnostic due to sub maximal stress  No perfusion defects patient developed non limiting anginal symptoms during exercise which resolved recovery  No LV perfusion defects detected  Renal Doppler negative for renal artery stenosis however 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%   Celiac and superior mesenteric arteries patent 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% stenosis identified at the proximal SMA     Vascular surgery consulted  No current intervention asymptomatic  Follow-up as an outpatient        On 2/08/22 Ms Christa Carter was seen in our  office for a recent hospitalization follow up visit  The office visit was conducted via the  line  She admits to left sided constant pressure, "like someone is pushing her" Home BP running [phone]    She is monitoring her sodium intake  CP was felt to by atypical   She was encouraged on weight loss  Ms Mullins Presumwesley presents to our office for a follow visit  She   Denies dyspnea with minimal exertion chest pain palpitations lightheadedness or dizziness  She complains of her weight increasing  She is walking 20 minutes a day for the last 10 days     HPI:  ASD secundum closure at the age of 31 years  Dyslipidemia 2/03/22 , , HDL 45,    IFG HgbA1C 6 0 on 12/04/21   Patient Active Problem List   Diagnosis    ASD secundum    Dyslipidemia    Hypertension, essential, benign    Menorrhagia    Valvular disease    Obesity (BMI 30 0-34  9)    Hypokalemia    Hypothyroidism    Vitamin D deficiency    IFG (impaired fasting glucose)     Past Medical History:   Diagnosis Date    Cardiac disorder     Congenital heart defect     last assessed 3/3/15, resolved 3/3/15    Endometrial polyp      Social History     Socioeconomic History    Marital status: /Civil Union     Spouse name: Not on file    Number of children: Not on file    Years of education: Not on file    Highest education level: Not on file   Occupational History    Not on file   Tobacco Use    Smoking status: Never Smoker    Smokeless tobacco: Never Used   Vaping Use    Vaping Use: Never used   Substance and Sexual Activity    Alcohol use: No    Drug use: No    Sexual activity: Yes     Partners: Male     Birth control/protection: None     Comment:    Other Topics Concern    Not on file   Social History Narrative    Daily coffee consumption, (1 cup/day)     Sedentary lifestyle      Social Determinants of Health     Financial Resource Strain: Low Risk     Difficulty of Paying Living Expenses: Not hard at all   Food Insecurity: No Food Insecurity    Worried About Running Out of Food in the Last Year: Never true    Jeff of Food in the Last Year: Never true   Transportation Needs: No Transportation Needs    Lack of Transportation (Medical): No    Lack of Transportation (Non-Medical): No   Physical Activity: Not on file   Stress: Not on file   Social Connections: Not on file   Intimate Partner Violence: Not on file   Housing Stability: Not on file      Family History   Problem Relation Age of Onset    Heart disease Mother     No Known Problems Father     Heart disease Maternal Grandmother     Coronary artery disease Family     No Known Problems Sister     No Known Problems Maternal Grandfather     No Known Problems Paternal Grandmother     No Known Problems Paternal Grandfather     No Known Problems Sister     No Known Problems Maternal Aunt     No Known Problems Maternal Aunt     No Known Problems Maternal Aunt     No Known Problems Maternal Aunt     No Known Problems Paternal Aunt     No Known Problems Paternal Aunt     No Known Problems Paternal Aunt     No Known Problems Paternal Aunt      Past Surgical History:   Procedure Laterality Date    CARDIAC SURGERY      valve repair      SECTION      x 2    ENDOMETRIAL BIOPSY      by suction     FOOT SURGERY Left     INSERTION OF INTRAUTERINE DEVICE (IUD)      REMOVAL OF INTRAUTERINE DEVICE (IUD)         Current Outpatient Medications:     amLODIPine (NORVASC) 5 mg tablet, TAKE 1 TABLET (5 MG TOTAL) BY MOUTH DAILY  , Disp: 30 tablet, Rfl: 0    aspirin (ECOTRIN LOW STRENGTH) 81 mg EC tablet, Take 81 mg by mouth daily, Disp: , Rfl:     atorvastatin (LIPITOR) 40 mg tablet, TAKE 1 TABLET BY MOUTH EVERY DAY WITH DINNER, Disp: 30 tablet, Rfl: 0    benzonatate (TESSALON PERLES) 100 mg capsule, Take 1 capsule (100 mg total) by mouth 3 (three) times a day as needed for cough, Disp: 20 capsule, Rfl: 0    bisacodyl (DULCOLAX) 5 mg EC tablet, Take as directed as per written office instructions, Disp: 2 tablet, Rfl: 0    ergocalciferol (VITAMIN D2) 50,000 units, Take 1 capsule (50,000 Units total) by mouth once a week, Disp: 12 capsule, Rfl: 0   labetalol (NORMODYNE) 200 mg tablet, TAKE 1 TABLET BY MOUTH EVERY 12 HOURS, Disp: 60 tablet, Rfl: 0    lisinopril (ZESTRIL) 20 mg tablet, Take 1 tablet (20 mg total) by mouth daily, Disp: 90 tablet, Rfl: 1    omega-3-acid ethyl esters (LOVAZA) 1 g capsule, Take 2 capsules (2 g total) by mouth 2 (two) times a day, Disp: 360 capsule, Rfl: 1    polyethylene glycol (GLYCOLAX) 17 GM/SCOOP powder, Take as directed as per written office instructions, Disp: 238 g, Rfl: 0  No Known Allergies    Labs:  Office Visit on 05/05/2022   Component Date Value    SARS-CoV-2 05/05/2022 Negative     INFLUENZA A PCR 05/05/2022 Negative     INFLUENZA B PCR 05/05/2022 Negative      Imaging: No results found  Review of Systems:  Review of Systems   Constitutional: Positive for fatigue  All other systems reviewed and are negative  Physical Exam:  Physical Exam  Vitals reviewed  Constitutional:       Appearance: She is obese  HENT:      Head: Normocephalic  Cardiovascular:      Rate and Rhythm: Normal rate and regular rhythm  Pulses: Normal pulses  Heart sounds: Normal heart sounds  Pulmonary:      Effort: Pulmonary effort is normal       Breath sounds: Normal breath sounds  Abdominal:      General: Bowel sounds are normal       Palpations: Abdomen is soft  Musculoskeletal:         General: Normal range of motion  Cervical back: Normal range of motion and neck supple  Right lower leg: No edema  Left lower leg: No edema  Skin:     General: Skin is warm and dry  Capillary Refill: Capillary refill takes less than 2 seconds  Neurological:      General: No focal deficit present  Mental Status: She is alert and oriented to person, place, and time     Psychiatric:         Mood and Affect: Mood normal          Behavior: Behavior normal          Discussion/Summary:  1, Hypertension RUE sitting 122/80  Labetalol 200 mg daily, amlodipine 5 mg daily lisinopril 20 mg daily, 2gm sodium diet 2  Dyslipidemia 2/03/22 , , HDL 45,  -  Continue Lipitor 40 mg daily, LOVAZA 2 g b i d  Heart healthy diet and daily exercise  3  IFG HgbA1C 6 0 on 12/04/21 continue to limit carbohydrates and concentrated sweets      4  Obesity BMI 33 58, ambulatory referral to weight loss management

## 2022-05-17 ENCOUNTER — OFFICE VISIT (OUTPATIENT)
Dept: CARDIOLOGY CLINIC | Facility: CLINIC | Age: 52
End: 2022-05-17
Payer: COMMERCIAL

## 2022-05-17 VITALS
OXYGEN SATURATION: 97 % | BODY MASS INDEX: 33.55 KG/M2 | HEIGHT: 61 IN | SYSTOLIC BLOOD PRESSURE: 144 MMHG | WEIGHT: 177.7 LBS | DIASTOLIC BLOOD PRESSURE: 80 MMHG | HEART RATE: 74 BPM

## 2022-05-17 DIAGNOSIS — E78.5 DYSLIPIDEMIA: ICD-10-CM

## 2022-05-17 DIAGNOSIS — I10 PRIMARY HYPERTENSION: Primary | ICD-10-CM

## 2022-05-17 DIAGNOSIS — E66.9 OBESITY (BMI 30-39.9): ICD-10-CM

## 2022-05-17 PROCEDURE — 99215 OFFICE O/P EST HI 40 MIN: CPT | Performed by: NURSE PRACTITIONER

## 2022-05-24 ENCOUNTER — OFFICE VISIT (OUTPATIENT)
Dept: FAMILY MEDICINE CLINIC | Facility: CLINIC | Age: 52
End: 2022-05-24
Payer: COMMERCIAL

## 2022-05-24 VITALS
OXYGEN SATURATION: 98 % | BODY MASS INDEX: 32.28 KG/M2 | TEMPERATURE: 99 F | WEIGHT: 171 LBS | DIASTOLIC BLOOD PRESSURE: 80 MMHG | HEIGHT: 61 IN | RESPIRATION RATE: 18 BRPM | SYSTOLIC BLOOD PRESSURE: 140 MMHG | HEART RATE: 82 BPM

## 2022-05-24 DIAGNOSIS — R05.3 PERSISTENT COUGH: Primary | ICD-10-CM

## 2022-05-24 PROCEDURE — 3079F DIAST BP 80-89 MM HG: CPT | Performed by: NURSE PRACTITIONER

## 2022-05-24 PROCEDURE — 3077F SYST BP >= 140 MM HG: CPT | Performed by: NURSE PRACTITIONER

## 2022-05-24 PROCEDURE — 3008F BODY MASS INDEX DOCD: CPT | Performed by: NURSE PRACTITIONER

## 2022-05-24 PROCEDURE — 99214 OFFICE O/P EST MOD 30 MIN: CPT | Performed by: NURSE PRACTITIONER

## 2022-05-24 PROCEDURE — 1036F TOBACCO NON-USER: CPT | Performed by: NURSE PRACTITIONER

## 2022-05-24 PROCEDURE — 3725F SCREEN DEPRESSION PERFORMED: CPT | Performed by: NURSE PRACTITIONER

## 2022-05-24 RX ORDER — ALBUTEROL SULFATE 90 UG/1
2 AEROSOL, METERED RESPIRATORY (INHALATION) EVERY 6 HOURS PRN
Qty: 18 G | Refills: 5 | Status: SHIPPED | OUTPATIENT
Start: 2022-05-24

## 2022-05-24 RX ORDER — BENZONATATE 200 MG/1
200 CAPSULE ORAL 3 TIMES DAILY PRN
Qty: 20 CAPSULE | Refills: 0 | Status: SHIPPED | OUTPATIENT
Start: 2022-05-24 | End: 2022-08-10 | Stop reason: ALTCHOICE

## 2022-05-24 RX ORDER — METHYLPREDNISOLONE 4 MG/1
TABLET ORAL
Qty: 21 EACH | Refills: 0 | Status: SHIPPED | OUTPATIENT
Start: 2022-05-24 | End: 2022-08-10 | Stop reason: ALTCHOICE

## 2022-05-24 NOTE — PROGRESS NOTES
Assessment/Plan:    Persistent cough- present for the past 3-4 weeks  Had initial improvement with Robitussin and Tessalon perles, then worsening symptoms  Very faint scattered wheezes on exam today that clear with coughing  Afebrile, pulse ox at 98%  Non smoker  Tested negative for COVID/flu earlier this month  To start Medrol dose faiza, SE reviewed, take with food, no NSAIDs while on this  May use the Albuterol rescue inhaler prn and we did review how to properly use this  Tessalon perles refilled for prn use per pt request   I did explain that coughs can linger for several weeks following a viral illness  Consider CXR if no improvement with the Medrol dose faiza and Albuterol inhaler  ddx also include PND (non on exam today) vs GERD      There are no diagnoses linked to this encounter  Subjective:      Patient ID: Geovnany Escobar is a 47 y o  female  HPI     Pt presents by herself today for an acute visit   She c/o a persistent dry cough for the past 3-4 weeks  She did she her PCP, Dr Zenaida Mcmahan, on 5/5/22, tested negative for COVID/flu at that time  She was started on Robitussin and Tessalon perles and notes an initial improvement in her cough but then worsening symptoms  Cough is constant, no worse at night  Always dry  Denies sore throat, sinus pressure, PND, F/C  She does note some wheezing with forceful coughing  No h/o asthma or COPD  Non smoker  Denies reflux  No recent sick contacts  No recent travel     The following portions of the patient's history were reviewed and updated as appropriate: allergies, current medications, past family history, past medical history, past social history, past surgical history and problem list     Review of Systems   Constitutional: Negative for activity change, appetite change, chills, diaphoresis, fatigue, fever and unexpected weight change     HENT: Negative for congestion, ear pain, postnasal drip, rhinorrhea, sinus pressure, sinus pain, sneezing, sore throat, tinnitus, trouble swallowing and voice change  Eyes: Negative for pain and visual disturbance  Respiratory: Positive for cough and wheezing  Negative for apnea, choking, chest tightness, shortness of breath and stridor  Cardiovascular: Negative for chest pain, palpitations and leg swelling  Gastrointestinal: Negative for abdominal pain, blood in stool, constipation, diarrhea, nausea and vomiting  Genitourinary: Negative for dysuria and hematuria  Musculoskeletal: Negative for arthralgias and back pain  Skin: Negative for color change and rash  Neurological: Negative for seizures and syncope  Hematological: Negative for adenopathy  Does not bruise/bleed easily  Psychiatric/Behavioral: Negative for agitation, behavioral problems, confusion, decreased concentration, dysphoric mood, hallucinations, self-injury, sleep disturbance and suicidal ideas  The patient is not nervous/anxious and is not hyperactive  All other systems reviewed and are negative  Objective:      /80 (BP Location: Left arm, Patient Position: Sitting, Cuff Size: Adult)   Pulse 82   Temp 99 °F (37 2 °C) (Tympanic)   Resp 18   Ht 5' 1" (1 549 m)   Wt 77 6 kg (171 lb)   LMP 02/01/2019 (Approximate)   SpO2 98%   BMI 32 31 kg/m²          Physical Exam  Constitutional:       General: She is not in acute distress  Appearance: She is well-developed  She is obese  She is not ill-appearing, toxic-appearing or diaphoretic  HENT:      Head: Normocephalic and atraumatic  Eyes:      Extraocular Movements: Extraocular movements intact  Conjunctiva/sclera: Conjunctivae normal       Pupils: Pupils are equal, round, and reactive to light  Neck:      Thyroid: No thyromegaly  Vascular: No carotid bruit  Cardiovascular:      Rate and Rhythm: Normal rate and regular rhythm  Heart sounds: Normal heart sounds  No murmur heard    Pulmonary:      Effort: Pulmonary effort is normal  No respiratory distress or retractions  Breath sounds: No decreased air movement  Wheezing (very faint, scattered wheezes that clears with coughing) present  No decreased breath sounds, rhonchi or rales  Abdominal:      General: Abdomen is flat  Bowel sounds are normal  There is no distension  Palpations: Abdomen is soft  Tenderness: There is no abdominal tenderness  Musculoskeletal:         General: Normal range of motion  Cervical back: Normal range of motion and neck supple  No rigidity or tenderness  Lymphadenopathy:      Cervical: No cervical adenopathy  Skin:     General: Skin is warm and dry  Neurological:      General: No focal deficit present  Mental Status: She is alert and oriented to person, place, and time  Psychiatric:         Mood and Affect: Mood normal          Behavior: Behavior normal          Thought Content:  Thought content normal          Judgment: Judgment normal

## 2022-06-15 DIAGNOSIS — R07.9 CHEST PAIN: ICD-10-CM

## 2022-06-15 RX ORDER — ATORVASTATIN CALCIUM 40 MG/1
TABLET, FILM COATED ORAL
Qty: 90 TABLET | Refills: 1 | Status: SHIPPED | OUTPATIENT
Start: 2022-06-15

## 2022-06-15 RX ORDER — LABETALOL 200 MG/1
TABLET, FILM COATED ORAL
Qty: 180 TABLET | Refills: 1 | Status: SHIPPED | OUTPATIENT
Start: 2022-06-15 | End: 2022-08-10 | Stop reason: ALTCHOICE

## 2022-08-08 DIAGNOSIS — E78.5 DYSLIPIDEMIA: ICD-10-CM

## 2022-08-08 DIAGNOSIS — I10 HYPERTENSION, ESSENTIAL, BENIGN: ICD-10-CM

## 2022-08-08 DIAGNOSIS — R07.9 CHEST PAIN: ICD-10-CM

## 2022-08-08 RX ORDER — LISINOPRIL 20 MG/1
20 TABLET ORAL DAILY
Qty: 90 TABLET | Refills: 1 | Status: SHIPPED | OUTPATIENT
Start: 2022-08-08

## 2022-08-08 RX ORDER — AMLODIPINE BESYLATE 5 MG/1
5 TABLET ORAL DAILY
Qty: 30 TABLET | Refills: 0 | Status: SHIPPED | OUTPATIENT
Start: 2022-08-08 | End: 2022-08-24

## 2022-08-08 RX ORDER — OMEGA-3-ACID ETHYL ESTERS 1 G/1
2 CAPSULE, LIQUID FILLED ORAL 2 TIMES DAILY
Qty: 360 CAPSULE | Refills: 1 | Status: SHIPPED | OUTPATIENT
Start: 2022-08-08

## 2022-08-10 ENCOUNTER — OFFICE VISIT (OUTPATIENT)
Dept: FAMILY MEDICINE CLINIC | Facility: CLINIC | Age: 52
End: 2022-08-10
Payer: COMMERCIAL

## 2022-08-10 VITALS
HEART RATE: 71 BPM | OXYGEN SATURATION: 97 % | WEIGHT: 177.8 LBS | SYSTOLIC BLOOD PRESSURE: 160 MMHG | TEMPERATURE: 96.2 F | RESPIRATION RATE: 16 BRPM | DIASTOLIC BLOOD PRESSURE: 82 MMHG | HEIGHT: 61 IN | BODY MASS INDEX: 33.57 KG/M2

## 2022-08-10 DIAGNOSIS — M79.89 LEG SWELLING: Primary | ICD-10-CM

## 2022-08-10 PROCEDURE — 99214 OFFICE O/P EST MOD 30 MIN: CPT | Performed by: FAMILY MEDICINE

## 2022-08-10 PROCEDURE — 3079F DIAST BP 80-89 MM HG: CPT | Performed by: FAMILY MEDICINE

## 2022-08-10 PROCEDURE — 3077F SYST BP >= 140 MM HG: CPT | Performed by: FAMILY MEDICINE

## 2022-08-10 NOTE — PROGRESS NOTES
Assessment/Plan:    Leg swelling  Patient has had swelling for the past week  No apparent swelling today on exam     This may be secondary to he and her work  A causing dependent swelling  Recommend that patient get CBC, CMP and TSH done to rule out any organic causes  A also of purchase compressions things as this can help to relieve some fluid  If no improvement, advised patient follow-up in 2 months       Diagnoses and all orders for this visit:    Leg swelling  -     TSH, 3rd generation with Free T4 reflex; Future  -     CBC and differential; Future  -     Comprehensive metabolic panel; Future        Subjective:   No chief complaint on file  Health Maintenance   Topic Date Due    Hepatitis C Screening  Never done    HIV Screening  Never done    Annual Physical  Never done    COVID-19 Vaccine (4 - Booster for Pfizer series) 04/11/2022    BMI: Followup Plan  05/18/2022    Influenza Vaccine (1) 09/01/2022    Breast Cancer Screening: Mammogram  01/31/2023    Depression Screening  05/24/2023    BMI: Adult  08/10/2023    Cervical Cancer Screening  10/01/2024    DTaP,Tdap,and Td Vaccines (3 - Td or Tdap) 03/12/2025    Colorectal Cancer Screening  02/22/2032    Pneumococcal Vaccine: Pediatrics (0 to 5 Years) and At-Risk Patients (6 to 59 Years)  Aged Out    HIB Vaccine  Aged Out    Hepatitis B Vaccine  Aged Out    IPV Vaccine  Aged Out    Hepatitis A Vaccine  Aged Out    Meningococcal ACWY Vaccine  Aged Out    HPV Vaccine  Aged Out        Patient ID: Agnes Coello is a 47 y o  female  HPI    Patient reports having a bilateral leg swelling a over the past week  Believes this might be due to the heat  Works in a [employer]und[employer] and is always on her feet daily  did have her heart checked previously in February of 2022  Echo and nuclear stress test was done which showed normal EF of 60%  No OTC medications  No other concerns today        The following portions of the patient's history were reviewed and updated as appropriate: allergies, current medications, past family history, past medical history, past social history, past surgical history, and problem list     Review of Systems   Constitutional: Positive for fatigue  HENT: Negative for congestion  Respiratory: Negative for cough and shortness of breath  Cardiovascular: Positive for leg swelling  Negative for chest pain and palpitations  Gastrointestinal: Negative for nausea and vomiting  Genitourinary: Negative for dysuria  Neurological: Negative for dizziness and headaches  Objective:  /82 (BP Location: Left arm, Patient Position: Sitting, Cuff Size: Large)   Pulse 71   Temp (!) 96 2 °F (35 7 °C) (Tympanic)   Resp 16   Ht 5' 1" (1 549 m)   Wt 80 6 kg (177 lb 12 8 oz)   LMP 02/01/2019 (Approximate)   SpO2 97%   BMI 33 60 kg/m²      Physical Exam  Vitals and nursing note reviewed  Constitutional:       General: She is not in acute distress  HENT:      Head: Normocephalic and atraumatic  Eyes:      Conjunctiva/sclera: Conjunctivae normal    Cardiovascular:      Rate and Rhythm: Normal rate and regular rhythm  Pulses: Normal pulses  Heart sounds: No murmur heard  Pulmonary:      Effort: Pulmonary effort is normal  No respiratory distress  Breath sounds: No wheezing or rales  Musculoskeletal:         General: No swelling  Comments: Legs to not appear to be swollen today  Dorsalis pedis and posterior tibial pulses intact 2/4 bilaterally   Neurological:      Mental Status: She is alert  This note has been constructed using a voice recognition system  There may be translation, syntax, or grammatical errors  If you have an questions, please contact the dictating provider

## 2022-08-10 NOTE — PATIENT INSTRUCTIONS
Please purchase compression stockings to help with leg swelling   Keep legs elevated at home when not working    Compression stockings between 15-30 mmg Hg or what you can find at the pharmacy

## 2022-08-10 NOTE — ASSESSMENT & PLAN NOTE
Patient has had swelling for the past week  No apparent swelling today on exam     This may be secondary to he and her work  A causing dependent swelling  Recommend that patient get CBC, CMP and TSH done to rule out any organic causes  A also of purchase compressions things as this can help to relieve some fluid    If no improvement, advised patient follow-up in 2 months

## 2022-08-24 DIAGNOSIS — R07.9 CHEST PAIN: ICD-10-CM

## 2022-08-24 RX ORDER — AMLODIPINE BESYLATE 5 MG/1
5 TABLET ORAL DAILY
Qty: 90 TABLET | Refills: 1 | Status: SHIPPED | OUTPATIENT
Start: 2022-08-24 | End: 2022-09-23

## 2023-01-05 ENCOUNTER — TELEMEDICINE (OUTPATIENT)
Dept: FAMILY MEDICINE CLINIC | Facility: CLINIC | Age: 53
End: 2023-01-05

## 2023-01-05 DIAGNOSIS — U07.1 COVID-19: Primary | ICD-10-CM

## 2023-01-05 RX ORDER — NIRMATRELVIR AND RITONAVIR 300-100 MG
3 KIT ORAL 2 TIMES DAILY
Qty: 30 TABLET | Refills: 0 | Status: SHIPPED | OUTPATIENT
Start: 2023-01-05 | End: 2023-01-10

## 2023-01-05 NOTE — LETTER
[address] [address]  Dept: [phone]    January 5, 2023    Patient: Tiburcio Flannery  YOB: 1970    Tiburcio Flannery was seen and evaluated at our [facility]  Covid and Flu tests are negative, they may return to work when fever free for 24 hours without the use of a fever reducing agent  If Covid or Flu test is positive, they may return to work on ***, as this is 5 days from the onset of symptoms  Upon return, they must then adhere to strict masking for an additional 5 days      Sincerely,    Taylor Ordoñez MD

## 2023-01-05 NOTE — PROGRESS NOTES
COVID-19 Outpatient Progress Note    Assessment/Plan:    Problem List Items Addressed This Visit    None  Visit Diagnoses     COVID-19    -  Primary    Relevant Medications    nirmatrelvir & ritonavir (Paxlovid, 300/100,) tablet therapy pack         Disposition:     Patient has asymptomatic or mild COVID-19 infection  Based off CDC guidelines, they were recommended to isolate for 5 days  If they are asymptomatic or symptoms are improving with no fevers in the past 24 hours, isolation may be ended followed by 5 days of wearing a mask when around othes to minimize risk of infecting others  If still have a fever or other symptoms have not improved, continue to isolate until they improve  Regardless of when they end isolation, avoid being around people who are more likely to get very sick from COVID-19 until at least day 11  Discussed vitamin D, vitamin C, and/or zinc supplementation with patient  Hold lipitor for 15 days  I have spent 15 minutes directly with the patient  Greater than 50% of this time was spent in counseling/coordination of care regarding: prognosis, risks and benefits of treatment options and instructions for management  Encounter provider: Aftab Osborne MD     Provider located at: [address] [address]     Recent Visits  No visits were found meeting these conditions  Showing recent visits within past 7 days and meeting all other requirements  Today's Visits  Date Type Provider Dept   [facility] [address] [place] [address] today's visits and meeting all other requirements  Future Appointments  No visits were found meeting these conditions  Showing future appointments within next 150 days and meeting all other requirements     This virtual check-in was done via telephone and she agrees to proceed      Patient agrees to participate in a virtual check in via telephone or video visit instead of presenting to the office to address urgent/immediate medical needs  Patient is aware this is a billable service  She acknowledged consent and understanding of privacy and security of the video platform  The patient has agreed to participate and understands they can discontinue the visit at any time  After connecting through Telephone, the patient was identified by name and date of birth  Robbie Martinez was informed that this was a telemedicine visit and that the exam was being conducted confidentially over secure lines  My office door was closed  No one else was in the room  Robbie Martinez acknowledged consent and understanding of privacy and security of the telemedicine visit  I informed the patient that I have reviewed her record in Epic and presented the opportunity for her to ask any questions regarding the visit today  The patient agreed to participate  It was my intent to perform this visit via video technology but the patient was not able to do a video connection so the visit was completed via audio telephone only  Verification of patient location:  Patient is located in the following state in which I hold an active license: [place]    Subjective:   Robbie Martinez is a 47 y o  female who has been screened for COVID-19  Symptom change since last report: unchanged  Patient's symptoms include cough  Patient denies fever, chills, fatigue, malaise, congestion, rhinorrhea, sore throat, loss of taste, shortness of breath, chest tightness, abdominal pain, nausea, vomiting, myalgias and headaches  - Date of symptom onset: 1/5/2023  - Date of positive COVID-19 test: 1/5/2023  Type of test: Home antigen  Patient with typical symptoms of COVID-19 and they attest that they were positive on home rapid antigen testing  Image of positive result is not able to be uploaded into their chart       COVID-19 vaccination status: Fully vaccinated with booster    [facility] has been staying home and has isolated themselves in her home  She is taking care to not share personal items and is cleaning all surfaces that are touched often, like counters, tabletops, and doorknobs using household cleaning sprays or wipes  She is wearing a mask when she leaves her room  Lab Results   Component Value Date    SARSCOV2 Negative 05/05/2022       Review of Systems   Constitutional: Negative for chills, fatigue and fever  HENT: Negative for congestion, rhinorrhea and sore throat  Respiratory: Positive for cough  Negative for chest tightness and shortness of breath  Gastrointestinal: Negative for abdominal pain, nausea and vomiting  Musculoskeletal: Negative for myalgias  Neurological: Negative for headaches  Current Outpatient Medications on File Prior to Visit   Medication Sig   • albuterol (Ventolin HFA) 90 mcg/act inhaler Inhale 2 puffs every 6 (six) hours as needed for wheezing (Patient not taking: Reported on 8/10/2022)   • amLODIPine (NORVASC) 5 mg tablet TAKE 1 TABLET (5 MG TOTAL) BY MOUTH DAILY  • aspirin (ECOTRIN LOW STRENGTH) 81 mg EC tablet Take 81 mg by mouth daily   • atorvastatin (LIPITOR) 40 mg tablet TAKE 1 TABLET BY MOUTH EVERY DAY WITH DINNER   • lisinopril (ZESTRIL) 20 mg tablet Take 1 tablet (20 mg total) by mouth daily   • omega-3-acid ethyl esters (LOVAZA) 1 g capsule Take 2 capsules (2 g total) by mouth 2 (two) times a day       Objective:    LMP 02/01/2019 (Approximate)      Physical Exam   It was my intent to perform this visit via video technology but the patient was not able to do a video connection so the visit was completed via audio telephone only      Mikey Hsu MD

## 2023-01-05 NOTE — LETTER
January 5, 2023    Patient: Lizette Berg  YOB: 1970  Date of Last Encounter: 8/24/2022      To whom it may concern:     Lizette Berg has tested positive for COVID-19 (Coronavirus)  She may return to work on 1/10/23, which is 5 days from illness onset (provided symptoms are improving) and 24 hours without fever      Sincerely,         Louis Schwartz MD

## 2023-05-22 ENCOUNTER — APPOINTMENT (EMERGENCY)
Dept: RADIOLOGY | Facility: HOSPITAL | Age: 53
End: 2023-05-22

## 2023-05-22 ENCOUNTER — TELEPHONE (OUTPATIENT)
Dept: OTHER | Facility: OTHER | Age: 53
End: 2023-05-22

## 2023-05-22 ENCOUNTER — HOSPITAL ENCOUNTER (EMERGENCY)
Facility: HOSPITAL | Age: 53
Discharge: HOME | End: 2023-05-22
Attending: EMERGENCY MEDICINE

## 2023-05-22 VITALS
TEMPERATURE: 97.6 F | OXYGEN SATURATION: 98 % | SYSTOLIC BLOOD PRESSURE: 198 MMHG | HEART RATE: 71 BPM | DIASTOLIC BLOOD PRESSURE: 95 MMHG | RESPIRATION RATE: 16 BRPM

## 2023-05-22 DIAGNOSIS — I65.01 ASYMPTOMATIC STENOSIS OF RIGHT VERTEBRAL ARTERY: ICD-10-CM

## 2023-05-22 DIAGNOSIS — R51.9 ACUTE NONINTRACTABLE HEADACHE, UNSPECIFIED HEADACHE TYPE: Primary | ICD-10-CM

## 2023-05-22 LAB
ALBUMIN SERPL BCP-MCNC: 3.9 G/DL (ref 3.5–5)
ALP SERPL-CCNC: 68 U/L (ref 46–116)
ALT SERPL W P-5'-P-CCNC: 25 U/L (ref 12–78)
ANION GAP SERPL CALCULATED.3IONS-SCNC: 1 MMOL/L (ref 4–13)
AST SERPL W P-5'-P-CCNC: 20 U/L (ref 5–45)
ATRIAL RATE: 72 BPM
BILIRUB SERPL-MCNC: 0.38 MG/DL (ref 0.2–1)
BUN SERPL-MCNC: 15 MG/DL (ref 5–25)
CALCIUM SERPL-MCNC: 9 MG/DL (ref 8.3–10.1)
CHLORIDE SERPL-SCNC: 108 MMOL/L (ref 96–108)
CO2 SERPL-SCNC: 24 MMOL/L (ref 21–32)
CREAT SERPL-MCNC: 0.63 MG/DL (ref 0.6–1.3)
GFR SERPL CREATININE-BSD FRML MDRD: 102 ML/MIN/1.73SQ M
GLUCOSE SERPL-MCNC: 131 MG/DL (ref 65–140)
P AXIS: 24 DEGREES
POTASSIUM SERPL-SCNC: 3.3 MMOL/L (ref 3.5–5.3)
PR INTERVAL: 182 MS
PROT SERPL-MCNC: 8 G/DL (ref 6.4–8.4)
QRS AXIS: 38 DEGREES
QRSD INTERVAL: 122 MS
QT INTERVAL: 448 MS
QTC INTERVAL: 490 MS
SODIUM SERPL-SCNC: 133 MMOL/L (ref 135–147)
T WAVE AXIS: 17 DEGREES
VENTRICULAR RATE: 72 BPM

## 2023-05-22 RX ORDER — KETOROLAC TROMETHAMINE 30 MG/ML
30 INJECTION, SOLUTION INTRAMUSCULAR; INTRAVENOUS ONCE
Status: COMPLETED | OUTPATIENT
Start: 2023-05-22 | End: 2023-05-22

## 2023-05-22 RX ORDER — MAGNESIUM SULFATE HEPTAHYDRATE 40 MG/ML
2 INJECTION, SOLUTION INTRAVENOUS ONCE
Status: COMPLETED | OUTPATIENT
Start: 2023-05-22 | End: 2023-05-22

## 2023-05-22 RX ORDER — METOCLOPRAMIDE HYDROCHLORIDE 5 MG/ML
10 INJECTION INTRAMUSCULAR; INTRAVENOUS ONCE
Status: COMPLETED | OUTPATIENT
Start: 2023-05-22 | End: 2023-05-22

## 2023-05-22 RX ORDER — ASPIRIN 81 MG/1
81 TABLET, CHEWABLE ORAL ONCE
Status: DISCONTINUED | OUTPATIENT
Start: 2023-05-22 | End: 2023-05-22 | Stop reason: HOSPADM

## 2023-05-22 RX ORDER — DIPHENHYDRAMINE HYDROCHLORIDE 50 MG/ML
25 INJECTION INTRAMUSCULAR; INTRAVENOUS ONCE
Status: COMPLETED | OUTPATIENT
Start: 2023-05-22 | End: 2023-05-22

## 2023-05-22 RX ADMIN — MAGNESIUM SULFATE HEPTAHYDRATE 2 G: 40 INJECTION, SOLUTION INTRAVENOUS at 12:47

## 2023-05-22 RX ADMIN — IOHEXOL 100 ML: 350 INJECTION, SOLUTION INTRAVENOUS at 13:41

## 2023-05-22 RX ADMIN — SODIUM CHLORIDE 1000 ML: 0.9 INJECTION, SOLUTION INTRAVENOUS at 12:53

## 2023-05-22 RX ADMIN — KETOROLAC TROMETHAMINE 30 MG: 30 INJECTION, SOLUTION INTRAMUSCULAR; INTRAVENOUS at 12:42

## 2023-05-22 RX ADMIN — METOCLOPRAMIDE 10 MG: 5 INJECTION, SOLUTION INTRAMUSCULAR; INTRAVENOUS at 12:42

## 2023-05-22 RX ADMIN — DIPHENHYDRAMINE HYDROCHLORIDE 25 MG: 50 INJECTION, SOLUTION INTRAMUSCULAR; INTRAVENOUS at 12:42

## 2023-05-22 NOTE — ED PROVIDER NOTES
"History  Chief Complaint   Patient presents with   • Headache     Pt c/o sudden onset headache, neck pain, nausea, nausea, and vision changes starting 1 hour ago  Pt reports feeling \"off balance\" walking towards the left  Patient is a 50-year-old female presenting to the ED for evaluation of cute onset generalized headache x1 hour prior to arrival   Patient states that she was at home when her symptoms began suddenly, pain described as a \"feels like my head is going to explode\" with associated posterior neck pain  She also states that she has some left arm numbness, nausea, photophobia  States that she has had headaches in the past but this is different in quality as it is more intense  She denies any fever chest pain shortness of breath weakness loss of consciousness or any other complaints at this time  Prior to Admission Medications   Prescriptions Last Dose Informant Patient Reported? Taking? albuterol (Ventolin HFA) 90 mcg/act inhaler   No No   Sig: Inhale 2 puffs every 6 (six) hours as needed for wheezing   Patient not taking: Reported on 8/10/2022   amLODIPine (NORVASC) 5 mg tablet   No No   Sig: TAKE 1 TABLET (5 MG TOTAL) BY MOUTH DAILY     aspirin (ECOTRIN LOW STRENGTH) 81 mg EC tablet   Yes No   Sig: Take 81 mg by mouth daily   atorvastatin (LIPITOR) 40 mg tablet   No No   Sig: TAKE 1 TABLET BY MOUTH EVERY DAY WITH DINNER   lisinopril (ZESTRIL) 20 mg tablet   No No   Sig: Take 1 tablet (20 mg total) by mouth daily   omega-3-acid ethyl esters (LOVAZA) 1 g capsule   No No   Sig: Take 2 capsules (2 g total) by mouth 2 (two) times a day      Facility-Administered Medications: None       Past Medical History:   Diagnosis Date   • Cardiac disorder    • Congenital heart defect     last assessed 3/3/15, resolved 3/3/15   • Endometrial polyp        Past Surgical History:   Procedure Laterality Date   • CARDIAC SURGERY      valve repair    •  SECTION      x 2   • ENDOMETRIAL BIOPSY      by " suction    • FOOT SURGERY Left    • INSERTION OF INTRAUTERINE DEVICE (IUD)     • REMOVAL OF INTRAUTERINE DEVICE (IUD)         Family History   Problem Relation Age of Onset   • Heart disease Mother    • No Known Problems Father    • Heart disease Maternal Grandmother    • Coronary artery disease Family    • No Known Problems Sister    • No Known Problems Maternal Grandfather    • No Known Problems Paternal Grandmother    • No Known Problems Paternal Grandfather    • No Known Problems Sister    • No Known Problems Maternal Aunt    • No Known Problems Maternal Aunt    • No Known Problems Maternal Aunt    • No Known Problems Maternal Aunt    • No Known Problems Paternal Aunt    • No Known Problems Paternal Aunt    • No Known Problems Paternal Aunt    • No Known Problems Paternal Aunt      I have reviewed and agree with the history as documented  E-Cigarette/Vaping   • E-Cigarette Use Never User      E-Cigarette/Vaping Substances   • Nicotine No    • THC No    • CBD No    • Flavoring No    • Other No    • Unknown No      Social History     Tobacco Use   • Smoking status: Never   • Smokeless tobacco: Never   Vaping Use   • Vaping Use: Never used   Substance Use Topics   • Alcohol use: No   • Drug use: No        Review of Systems   Constitutional: Negative for chills and fever  Eyes: Positive for photophobia  Respiratory: Negative for shortness of breath  Cardiovascular: Negative for chest pain  Gastrointestinal: Positive for nausea  Negative for abdominal pain and vomiting  Musculoskeletal: Positive for neck pain  Negative for back pain  Skin: Negative for rash  Neurological: Positive for numbness and headaches  Negative for syncope and weakness  All other systems reviewed and are negative        Physical Exam  ED Triage Vitals   Temperature Pulse Respirations Blood Pressure SpO2   05/22/23 1202 05/22/23 1204 05/22/23 1204 05/22/23 1204 05/22/23 1204   97 6 °F (36 4 °C) 71 16 (!) 198/95 98 %      Temp Source Heart Rate Source Patient Position - Orthostatic VS BP Location FiO2 (%)   05/22/23 1202 05/22/23 1204 05/22/23 1204 05/22/23 1204 --   Oral Monitor Sitting Left arm       Pain Score       05/22/23 1225       10 - Worst Possible Pain             Orthostatic Vital Signs  Vitals:    05/22/23 1204   BP: (!) 198/95   Pulse: 71   Patient Position - Orthostatic VS: Sitting       Physical Exam  Vitals and nursing note reviewed  Constitutional:       General: She is not in acute distress  Appearance: Normal appearance  She is obese  She is not ill-appearing or diaphoretic  HENT:      Head: Normocephalic and atraumatic  Mouth/Throat:      Mouth: Mucous membranes are moist    Eyes:      Extraocular Movements: Extraocular movements intact  Cardiovascular:      Rate and Rhythm: Normal rate and regular rhythm  Pulmonary:      Effort: Pulmonary effort is normal  No respiratory distress  Breath sounds: Normal breath sounds  Abdominal:      Palpations: Abdomen is soft  Tenderness: There is no abdominal tenderness  Musculoskeletal:         General: Normal range of motion  Cervical back: Normal range of motion and neck supple  No rigidity or tenderness  Skin:     General: Skin is warm and dry  Neurological:      General: No focal deficit present  Mental Status: She is alert and oriented to person, place, and time  Cranial Nerves: No cranial nerve deficit  Sensory: No sensory deficit  Motor: No weakness           ED Medications  Medications   sodium chloride 0 9 % bolus 1,000 mL (0 mL Intravenous Stopped 5/22/23 1711)   diphenhydrAMINE (BENADRYL) injection 25 mg (25 mg Intravenous Given 5/22/23 1242)   metoclopramide (REGLAN) injection 10 mg (10 mg Intravenous Given 5/22/23 1242)   ketorolac (TORADOL) injection 30 mg (30 mg Intravenous Given 5/22/23 1242)   magnesium sulfate 2 g/50 mL IVPB (premix) 2 g (0 g Intravenous Stopped 5/22/23 1711)   iohexol (OMNIPAQUE) 350 MG/ML injection (SINGLE-DOSE) 100 mL (100 mL Intravenous Given 5/22/23 1341)       Diagnostic Studies  Results Reviewed     Procedure Component Value Units Date/Time    Comprehensive metabolic panel [[id number]]  (Abnormal) Collected: 05/22/23 1228    Lab Status: Final result Specimen: Blood from Arm, Right Updated: 05/22/23 1301     Sodium 133 mmol/L      Potassium 3 3 mmol/L      Chloride 108 mmol/L      CO2 24 mmol/L      ANION GAP 1 mmol/L      BUN 15 mg/dL      Creatinine 0 63 mg/dL      Glucose 131 mg/dL      Calcium 9 0 mg/dL      AST 20 U/L      ALT 25 U/L      Alkaline Phosphatase 68 U/L      Total Protein 8 0 g/dL      Albumin 3 9 g/dL      Total Bilirubin 0 38 mg/dL      eGFR 102 ml/min/1 73sq m     Narrative:      [place] guidelines for Chronic Kidney Disease (CKD):   •  Stage 1 with normal or high GFR (GFR > 90 mL/min/1 73 square meters)  •  Stage 2 Mild CKD (GFR = 60-89 mL/min/1 73 square meters)  •  Stage 3A Moderate CKD (GFR = 45-59 mL/min/1 73 square meters)  •  Stage 3B Moderate CKD (GFR = 30-44 mL/min/1 73 square meters)  •  Stage 4 Severe CKD (GFR = 15-29 mL/min/1 73 square meters)  •  Stage 5 End Stage CKD (GFR <15 mL/min/1 73 square meters)  Note: GFR calculation is accurate only with a steady state creatinine                 CTA head and neck with and without contrast   Final Result by Dustin Bowie MD (05/22 1432)      No intracranial hemorrhage or cortical infarction  No cervical or intracranial large vessel occlusion  Normal carotid bifurcation  Short segment of mild vessel wall irregularity and narrowing in the distal right cervical vertebral artery as it exits the foramen transversarium (proximal V3 segment)  Differential considerations include fibromuscular dysplasia, mild vasospasm, or type    I dissection injury  There is no definable intimal flap or evidence of pseudoaneurysm                 Workstation performed: [id number]         CTA head and neck w wo contrast    (Results Pending)         Procedures  ECG 12 Lead Documentation Only    Date/Time: 5/22/2023 1:19 PM  Performed by: Vivi Pacheco DO  Authorized by: Vivi Pacheco DO     Indications / Diagnosis:  Headache  Patient location:  ED  Previous ECG:     Previous ECG:  Compared to current    Similarity:  Changes noted  Interpretation:     Interpretation: abnormal    Rate:     ECG rate:  72    ECG rate assessment: normal    Rhythm:     Rhythm: sinus rhythm    Ectopy:     Ectopy: none    QRS:     QRS axis:  Normal    QRS intervals:  Normal  Conduction:     Conduction: abnormal      Abnormal conduction: complete RBBB    ST segments:     ST segments:  Normal  T waves:     T waves: inverted      Inverted:  V1, V2 and V3          ED Course  ED Course as of 05/22/23 2032   Mon May 22, 2023   1626 D/w neurosurg AP on call re: incidental CTA neck finding, recommend 81mg ASA, no further ED w/u needed they recommend repeat CTA and outpatient f/u with them in x3 months  Pt improved, no change in exam, will d/c home with f/u and imaging ordered   1644 Results and neurosurgery plan discussed with patient using the  device  All questions were answered  Patient verbalized understanding of what is going on and the plan  Strict return precautions were given which patient verbalized understanding  She is hemodynamically stable and cleared for discharge                             SBIRT 22yo+    Flowsheet Row Most Recent Value   Initial Alcohol Screen: US AUDIT-C     1  How often do you have a drink containing alcohol? 0 Filed at: 05/22/2023 1205   2  How many drinks containing alcohol do you have on a typical day you are drinking? 0 Filed at: 05/22/2023 1205   3a  Male UNDER 65: How often do you have five or more drinks on one occasion? 0 Filed at: 05/22/2023 1205   3b  FEMALE Any Age, or MALE 65+: How often do you have 4 or more drinks on one occassion?  0 Filed at: 05/22/2023 1205   Audit-C Score 0 Filed at: 05/22/2023 1208   THALIA: How many times in the past year have you    Used an illegal drug or used a prescription medication for non-medical reasons? Never Filed at: 05/22/2023 1206                Medical Decision Making  Patient is a 48-year-old female presenting for evaluation of headache    Differential: Most likely primary headache migraine, however given complaints must rule out [facility] versus vertebral artery dissection  Plan: CTA head and neck with and without contrast, labs, EKG, migraine cocktail, monitor and reassess  Final dispo pending    No subarachnoid seen on CT  Final read pending  Labs unremarkable  EKG as discussed above    On reassessment patient feels improved  See ED course for discussion with neurosurgery and patient  Patient has remained hemodynamically stable during her entire ED course and is cleared for discharge with outpatient follow-up with her PCP and neurosurgery  Return precautions given patient verbalized understanding    Amount and/or Complexity of Data Reviewed  Labs: ordered  Radiology: ordered  Risk  Prescription drug management  Disposition  Final diagnoses:   Acute nonintractable headache, unspecified headache type   Asymptomatic stenosis of right vertebral artery     Time reflects when diagnosis was documented in both MDM as applicable and the Disposition within this note     Time User Action Codes Description Comment    5/22/2023  4:26 PM Nikita Mckeon Add [R51 9] Acute nonintractable headache, unspecified headache type     5/22/2023  4:30 PM Nikita Mckeon Add [I65 01] Asymptomatic stenosis of right vertebral artery       ED Disposition     ED Disposition   Discharge    Condition   Stable    Date/Time   Mon May 22, 2023  4:26 PM    [address] discharge to home/self care                 Follow-up Information     Follow up With Specialties Details Why [address] [facility], [address], Nurse Practitioner Call in 2 days  [address]  [address] [facility]  [phone]            Discharge Medication List as of 5/22/2023  4:35 PM      CONTINUE these medications which have NOT CHANGED    Details   albuterol (Ventolin HFA) 90 mcg/act inhaler Inhale 2 puffs every 6 (six) hours as needed for wheezing, Starting Tue 5/24/2022, Normal      amLODIPine (NORVASC) 5 mg tablet TAKE 1 TABLET (5 MG TOTAL) BY MOUTH DAILY  , Starting Wed 8/24/2022, Until Fri 9/23/2022, Normal      aspirin (ECOTRIN LOW STRENGTH) 81 mg EC tablet Take 81 mg by mouth daily, Historical Med      atorvastatin (LIPITOR) 40 mg tablet TAKE 1 TABLET BY MOUTH EVERY DAY WITH DINNER, Normal      lisinopril (ZESTRIL) 20 mg tablet Take 1 tablet (20 mg total) by mouth daily, Starting Mon 8/8/2022, Normal      omega-3-acid ethyl esters (LOVAZA) 1 g capsule Take 2 capsules (2 g total) by mouth 2 (two) times a day, Starting Mon 8/8/2022, Normal           Outpatient Discharge Orders   CTA head and neck w wo contrast   Standing Status: Future Standing Exp  Date: 05/22/27       PDMP Review     None           ED Provider  Attending physically available and evaluated David Dempsey I managed the patient along with the ED Attending      Electronically Signed by         Humberto Ocampo DO  05/22/23 2034

## 2023-05-22 NOTE — ED ATTENDING ATTESTATION
"Alfred Elmore MD, saw and evaluated the patient  I have discussed the patient with the resident and agree with the resident's findings, Plan of Care, and MDM as documented in the resident's note, except where noted  All available labs and Radiology studies were reviewed  I was present for key portions of any procedure(s) performed by the resident and I was immediately available to provide assistance  At this point I agree with the current assessment done in the Emergency Department  I have conducted an independent evaluation of this patient a history and physical is as follows:    47 yo morbidly obese female with a history of dyslipidemia, HTN, and hypothyroidism presents to the ED complaining of a headache x 1 hour  The patient reports sudden onset of a generalized \"throbbing\" headache with associated posterior neck pain  Onset while at rest  She is also experiencing some vague left arm numbness, nausea, lightheadedness, and photophobia  (+) History of similar headaches in the past \"but not as bad\"  No visual disturbance  She denies chest pain, shortness of breath, vomiting, and diaphoresis  No falls or trauma to the head  No syncope  The patient has not taken any OTC pain medications  No fevers or chills  No other specific complaints  ROS: per resident physician note    Gen: NAD, AA&Ox3  HEENT: PERRL, EOMI, TMs clear, no nystagmus  Neck: supple, FROM, no meningeal signs  CV: RRR  Lungs: CTA B/L  Abdomen: soft, NT/ND  Ext: no swelling or deformity  Neuro: 5/5 strength all extremities, sensation grossly intact, steady gait, (-) Romberg  Skin: no rash    ED Course  The patient is very comfortable appearing with a benign physical examination despite her complaints  (+) Moderately elevated BP on arrival but she admits to medication noncompliance this morning  Migraine headache vs simple tension headache vs ICH vs vertebral artery dissection? Will check EKG, basic labs, and CTA head/neck   aFm, Reglan, " Benadryl, and magnesium ordered  Will continue to monitor in the ED  Disposition per workup and reassessment        Critical Care Time  Procedures

## 2023-05-22 NOTE — DISCHARGE INSTRUCTIONS
No intracranial hemorrhage or cortical infarction  No cervical or intracranial large vessel occlusion  Normal carotid bifurcation  Short segment of mild vessel wall irregularity and narrowing in the distal right cervical vertebral artery as it exits the foramen transversarium (proximal V3 segment)  Differential considerations include fibromuscular dysplasia, mild vasospasm, or type   I dissection injury  There is no definable intimal flap or evidence of pseudoaneurysm  You were seen and evaluated in the emergency department for headache  Symptoms are likely due to primary headache no head bleed seen  There was an incidental finding on your neck CT, please see above  Will place referral for neurosurgery evaluation, follow-up with them in 3 months  Please follow-up with your primary care doctor within the next 48 hours      If your symptoms worsen or persist please return immediately to the emergency department for further evaluation management

## 2023-05-23 ENCOUNTER — TELEPHONE (OUTPATIENT)
Dept: NEUROSURGERY | Facility: CLINIC | Age: 53
End: 2023-05-23

## 2023-05-23 ENCOUNTER — NURSE TRIAGE (OUTPATIENT)
Dept: OTHER | Facility: OTHER | Age: 53
End: 2023-05-23

## 2023-05-23 DIAGNOSIS — R07.9 CHEST PAIN: ICD-10-CM

## 2023-05-23 DIAGNOSIS — E78.5 DYSLIPIDEMIA: ICD-10-CM

## 2023-05-23 DIAGNOSIS — I10 HYPERTENSION, ESSENTIAL, BENIGN: ICD-10-CM

## 2023-05-23 RX ORDER — LISINOPRIL 20 MG/1
20 TABLET ORAL DAILY
Qty: 90 TABLET | Refills: 1 | Status: SHIPPED | OUTPATIENT
Start: 2023-05-23

## 2023-05-23 RX ORDER — AMLODIPINE BESYLATE 5 MG/1
5 TABLET ORAL DAILY
Qty: 90 TABLET | Refills: 1 | Status: SHIPPED | OUTPATIENT
Start: 2023-05-23 | End: 2023-06-22

## 2023-05-23 RX ORDER — OMEGA-3-ACID ETHYL ESTERS 1 G/1
2 CAPSULE, LIQUID FILLED ORAL 2 TIMES DAILY
Qty: 360 CAPSULE | Refills: 1 | Status: SHIPPED | OUTPATIENT
Start: 2023-05-23

## 2023-05-23 NOTE — TELEPHONE ENCOUNTER
"  Reason for Disposition  • Systolic BP  >= 599 OR Diastolic >= 940    Answer Assessment - Initial Assessment Questions  1  LOCATION: \"Where does it hurt? \"       Forehead and neck   2  ONSET: \"When did the headache start? \" (Minutes, hours or days)       Yesterday   3  PATTERN: \"Does the pain come and go, or has it been constant since it started? \"      Constant   4  SEVERITY: \"How bad is the pain? \" and \"What does it keep you from doing? \"  (e g , Scale 1-10; mild, moderate, or severe)    - MILD (1-3): doesn't interfere with normal activities     - MODERATE (4-7): interferes with normal activities or awakens from sleep     - SEVERE (8-10): excruciating pain, unable to do any normal activities         Moderate 4 out of 10   5  RECURRENT SYMPTOM: \"Have you ever had headaches before? \" If Yes, ask: \"When was the last time? \" and \"What happened that time? \"        First time   6  CAUSE: \"What do you think is causing the headache? \"      Patient went to ER yesterday, her BP was 198/95  Patient has been blood pressure medication for one month  7  MIGRAINE: \"Have you been diagnosed with migraine headaches? \" If Yes, ask: \"Is this headache similar? \"       No   8  HEAD INJURY: \"Has there been any recent injury to the head? \"       No   9  OTHER SYMPTOMS: \"Do you have any other symptoms? \" (fever, stiff neck, eye pain, sore throat, cold symptoms)       No   10  PREGNANCY: \"Is there any chance you are pregnant? \" \"When was your last menstrual period? \"        No    Answer Assessment - Initial Assessment Questions  1  BLOOD PRESSURE: \"What is the blood pressure? \" \"Did you take at least two measurements 5 minutes apart?\"      161/97 today morning  2  ONSET: \"When did you take your blood pressure? \"      Yesterday   3  HOW: \"How did you obtain the blood pressure? \" (e g , visiting nurse, automatic home BP monitor)       Automatic BP machine   4  HISTORY: \"Do you have a history of high blood pressure? \"      HTN  5   MEDICATIONS: Anupama Bryant you " "taking any medications for blood pressure? \" \"Have you missed any doses recently? \"      Patient has been off BP medication for a month   6  OTHER SYMPTOMS: \"Do you have any symptoms? \" (e g , headache, chest pain, blurred vision, difficulty breathing, weakness)      Headache    Protocols used: HIGH BLOOD PRESSURE-ADULT-AH, HEADACHE-ADULT-AH    "

## 2023-05-23 NOTE — TELEPHONE ENCOUNTER
"Regarding: Headache  ----- Message from Marlyn Mendieta sent at 5/23/2023  7:13 AM EDT -----  \"I am having a major headache since yesterday and I'd like to schedule an appointment to be seen today  \"    "

## 2023-05-23 NOTE — TELEPHONE ENCOUNTER
05/23/2023-PT IS DISCHARGED HOME  08/23/2023 SNPX W/MARCHALINO W/CTA HEAD & NECK    05/22/2023-Oscar Arevalo PA-C  New patient 3 month follow up [address] with EM  CTA head and neck     [place]?  Patient is [place] speaking

## 2023-05-23 NOTE — TELEPHONE ENCOUNTER
No appointments are available today  Patient agreeable to call PCP office when it is open today to follow up  If unable to be seen, to proceed to [facility] for further evaluation

## 2023-05-24 ENCOUNTER — OFFICE VISIT (OUTPATIENT)
Dept: FAMILY MEDICINE CLINIC | Facility: CLINIC | Age: 53
End: 2023-05-24

## 2023-05-24 VITALS
DIASTOLIC BLOOD PRESSURE: 84 MMHG | OXYGEN SATURATION: 97 % | BODY MASS INDEX: 33.87 KG/M2 | WEIGHT: 179.4 LBS | HEART RATE: 78 BPM | TEMPERATURE: 98.5 F | HEIGHT: 61 IN | SYSTOLIC BLOOD PRESSURE: 142 MMHG | RESPIRATION RATE: 16 BRPM

## 2023-05-24 DIAGNOSIS — E66.9 OBESITY (BMI 30.0-34.9): ICD-10-CM

## 2023-05-24 DIAGNOSIS — Z11.4 SCREENING FOR HIV (HUMAN IMMUNODEFICIENCY VIRUS): ICD-10-CM

## 2023-05-24 DIAGNOSIS — I10 HYPERTENSION, ESSENTIAL, BENIGN: Primary | ICD-10-CM

## 2023-05-24 DIAGNOSIS — E78.5 DYSLIPIDEMIA: ICD-10-CM

## 2023-05-24 DIAGNOSIS — Z11.59 ENCOUNTER FOR HEPATITIS C SCREENING TEST FOR LOW RISK PATIENT: ICD-10-CM

## 2023-05-24 DIAGNOSIS — R73.01 IFG (IMPAIRED FASTING GLUCOSE): ICD-10-CM

## 2023-05-24 DIAGNOSIS — Z12.31 ENCOUNTER FOR SCREENING MAMMOGRAM FOR MALIGNANT NEOPLASM OF BREAST: ICD-10-CM

## 2023-05-24 RX ORDER — ATORVASTATIN CALCIUM 40 MG/1
40 TABLET, FILM COATED ORAL
Qty: 90 TABLET | Refills: 1 | Status: SHIPPED | OUTPATIENT
Start: 2023-05-24

## 2023-05-24 NOTE — ASSESSMENT & PLAN NOTE
ED notes reviewed  BP is acceptable today  Continue Lisinopril 20 mg daily, Amlodipine 5 mg daily   No longer on Labetalol and unclear when/why this was stopped but BP seems to be tolerating this  Referred back to Cardiology as she is overdue for this   Follow a low sodium diet  Monitor BP daily at home and keep a log  Updated lab work ordered today

## 2023-05-24 NOTE — PROGRESS NOTES
Name: Yvette Stewart      : 1970      MRN: [id number]  Encounter Provider: HARLEY Murray  Encounter Date: 2023   Encounter department: [address]     1  Hypertension, essential, benign  Assessment & Plan:  ED notes reviewed  BP is acceptable today  Continue Lisinopril 20 mg daily, Amlodipine 5 mg daily   No longer on Labetalol and unclear when/why this was stopped but BP seems to be tolerating this  Referred back to Cardiology as she is overdue for this   Follow a low sodium diet  Monitor BP daily at home and keep a log  Updated lab work ordered today      Orders:  -     CBC and differential; Future  -     Comprehensive metabolic panel; Future  -     TSH, 3rd generation with Free T4 reflex; Future  -     Ambulatory Referral to Cardiology; Future    2  IFG (impaired fasting glucose)  Assessment & Plan:  FBS and A1c ordered  Encouraged lifestyle modifications    Orders:  -     HEMOGLOBIN A1C W/ EAG ESTIMATION; Future    3  Dyslipidemia  Assessment & Plan:  Updated lipid panel ordered  Continue statin therapy and Omega 3    Orders:  -     Lipid panel; Future    4  Obesity (BMI 30 0-34  9)  Assessment & Plan:  Encouraged lifestyle modifications  Pt has no interest in seeing weight management at this time       5  Encounter for hepatitis C screening test for low risk patient  -     Hepatitis C antibody; Future    6  Encounter for screening mammogram for malignant neoplasm of breast  -     Mammo screening bilateral w 3d & cad; Future; Expected date: 2023    BMI Counseling: Body mass index is 33 9 kg/m²  The BMI is above normal  Nutrition recommendations include encouraging healthy choices of fruits and vegetables, moderation in carbohydrate intake and increasing intake of lean protein  Exercise recommendations include exercising 3-5 times per week  Rationale for BMI follow-up plan is due to patient being overweight or obese       Depression Screening and Follow-up Plan: Patient was screened for depression during today's encounter  They screened negative with a PHQ-2 score of 0  Subjective     HPI     Pt presents by herself today for and ED follow up  Presented to the ED on 5/22/23 with a headache  At at time, sodium noted to be 133, potassium 3 3  EKG with SR and a complete RBBB  CTA head and neck with no acute changes but did note short segment of the mild vessel wall irregularity and narrowing in the distal right cervical vertebral artery  Neurosurgery recommended 81mg ASA daily with a repeat CTA and outpatient r/u with them in 3 months      Pt was following with Cardiology, last seen 5/17/22  She was advised to follow up with cardiology in 3 months which would have been 8/2022 but patient didn't realize this  At that time, she was prescribed Lisinopril 20 mg daily, Amlodipine 5 mg daily and Labetalol 200 mg daily  At some point, patient stopped the Labetalol on her own (she is unsure why/when)  BP at home runs 130s/70s-80s  Pt denies CP, palpitations, edema, SOB, dizziness, tinnitus, facial flushing  She was having a headache but denies headache today       Review of Systems   Constitutional: Negative for activity change, appetite change, chills, diaphoresis, fatigue, fever and unexpected weight change  HENT: Negative for ear pain and sore throat  Eyes: Negative for pain and visual disturbance  Respiratory: Negative for cough and shortness of breath  Cardiovascular: Negative for chest pain and palpitations  Gastrointestinal: Negative for abdominal pain and vomiting  Genitourinary: Negative for dysuria and hematuria  Musculoskeletal: Negative for arthralgias and back pain  Skin: Negative for color change and rash  Neurological: Positive for headaches  Negative for seizures, syncope and weakness  Hematological: Negative for adenopathy  Does not bruise/bleed easily     Psychiatric/Behavioral: Negative for decreased concentration, dysphoric mood, self-injury, sleep disturbance and suicidal ideas  The patient is not nervous/anxious  All other systems reviewed and are negative        Past Medical History:   Diagnosis Date   • Cardiac disorder    • Congenital heart defect     last assessed 3/3/15, resolved 3/3/15   • Endometrial polyp      Past Surgical History:   Procedure Laterality Date   • CARDIAC SURGERY      valve repair    •  SECTION      x 2   • ENDOMETRIAL BIOPSY      by suction    • FOOT SURGERY Left    • INSERTION OF INTRAUTERINE DEVICE (IUD)     • REMOVAL OF INTRAUTERINE DEVICE (IUD)       Family History   Problem Relation Age of Onset   • Heart disease Mother    • No Known Problems Father    • Heart disease Maternal Grandmother    • Coronary artery disease Family    • No Known Problems Sister    • No Known Problems Maternal Grandfather    • No Known Problems Paternal Grandmother    • No Known Problems Paternal Grandfather    • No Known Problems Sister    • No Known Problems Maternal Aunt    • No Known Problems Maternal Aunt    • No Known Problems Maternal Aunt    • No Known Problems Maternal Aunt    • No Known Problems Paternal Aunt    • No Known Problems Paternal Aunt    • No Known Problems Paternal Aunt    • No Known Problems Paternal Aunt      Social History     Socioeconomic History   • Marital status: /Civil Union     Spouse name: None   • Number of children: None   • Years of education: None   • Highest education level: None   Occupational History   • None   Tobacco Use   • Smoking status: Never   • Smokeless tobacco: Never   Vaping Use   • Vaping Use: Never used   Substance and Sexual Activity   • Alcohol use: No   • Drug use: No   • Sexual activity: Yes     Partners: Male     Birth control/protection: None     Comment:    Other Topics Concern   • None   Social History Narrative    Daily coffee consumption, (1 cup/day)     Sedentary lifestyle      Social Determinants of Health "    Financial Resource Strain: Not on file   Food Insecurity: Not on file   Transportation Needs: Not on file   Physical Activity: Inactive (9/18/2019)    Exercise Vital Sign    • Days of Exercise per Week: 0 days    • Minutes of Exercise per Session: 0 min   Stress: Stress Concern Present (9/18/2019)    [place] [address] [place]    • Feeling of Stress : To some extent   Social Connections: Not on file   Intimate Partner Violence: Not on file   Housing Stability: Not on file     Current Outpatient Medications on File Prior to Visit   Medication Sig   • amLODIPine (NORVASC) 5 mg tablet Take 1 tablet (5 mg total) by mouth daily   • aspirin (ECOTRIN LOW STRENGTH) 81 mg EC tablet Take 81 mg by mouth daily   • atorvastatin (LIPITOR) 40 mg tablet Take 1 tablet (40 mg total) by mouth daily with dinner   • lisinopril (ZESTRIL) 20 mg tablet Take 1 tablet (20 mg total) by mouth daily   • omega-3-acid ethyl esters (LOVAZA) 1 g capsule Take 2 capsules (2 g total) by mouth 2 (two) times a day   • albuterol (Ventolin HFA) 90 mcg/act inhaler Inhale 2 puffs every 6 (six) hours as needed for wheezing (Patient not taking: Reported on 5/24/2023)     No Known Allergies  Immunization History   Administered Date(s) Administered   • COVID-19 PFIZER VACCINE 0 3 ML IM 04/03/2021, 04/25/2021, 12/11/2021   • Tdap 01/01/2015, 03/12/2015       Objective     /84 (BP Location: Left arm, Patient Position: Sitting, Cuff Size: Adult)   Pulse 78   Temp 98 5 °F (36 9 °C) (Tympanic)   Resp 16   Ht 5' 1\" (1 549 m)   Wt 81 4 kg (179 lb 6 4 oz)   LMP 02/01/2019 (Approximate)   SpO2 97%   BMI 33 90 kg/m²     Physical Exam  Constitutional:       General: She is not in acute distress  Appearance: She is well-developed  She is obese  She is not ill-appearing, toxic-appearing or diaphoretic  HENT:      Head: Normocephalic and atraumatic     Eyes:      Extraocular Movements: Extraocular " movements intact  Conjunctiva/sclera: Conjunctivae normal       Pupils: Pupils are equal, round, and reactive to light  Neck:      Thyroid: No thyromegaly  Vascular: No carotid bruit  Cardiovascular:      Rate and Rhythm: Normal rate and regular rhythm  Heart sounds: Normal heart sounds  No murmur heard  Pulmonary:      Effort: Pulmonary effort is normal  No respiratory distress  Breath sounds: Normal breath sounds  No wheezing  Abdominal:      General: Bowel sounds are normal  There is no distension  Palpations: Abdomen is soft  Tenderness: There is no abdominal tenderness  Musculoskeletal:         General: Normal range of motion  Cervical back: Normal range of motion and neck supple  No rigidity or tenderness  Lymphadenopathy:      Cervical: No cervical adenopathy  Skin:     General: Skin is warm and dry  Neurological:      General: No focal deficit present  Mental Status: She is alert and oriented to person, place, and time  Psychiatric:         Mood and Affect: Mood normal          Behavior: Behavior normal          Thought Content:  Thought content normal          Judgment: Judgment normal        HARLEY Diaz

## 2023-05-26 ENCOUNTER — RA CDI HCC (OUTPATIENT)
Dept: OTHER | Facility: HOSPITAL | Age: 53
End: 2023-05-26

## 2023-06-03 ENCOUNTER — APPOINTMENT (OUTPATIENT)
Dept: LAB | Facility: CLINIC | Age: 53
End: 2023-06-03
Payer: COMMERCIAL

## 2023-06-03 DIAGNOSIS — R73.01 IFG (IMPAIRED FASTING GLUCOSE): ICD-10-CM

## 2023-06-03 DIAGNOSIS — Z11.4 SCREENING FOR HIV (HUMAN IMMUNODEFICIENCY VIRUS): ICD-10-CM

## 2023-06-03 DIAGNOSIS — M79.89 LEG SWELLING: ICD-10-CM

## 2023-06-03 DIAGNOSIS — I10 HYPERTENSION, ESSENTIAL, BENIGN: ICD-10-CM

## 2023-06-03 DIAGNOSIS — Z11.59 ENCOUNTER FOR HEPATITIS C SCREENING TEST FOR LOW RISK PATIENT: ICD-10-CM

## 2023-06-03 DIAGNOSIS — E78.5 DYSLIPIDEMIA: ICD-10-CM

## 2023-06-03 LAB
ALBUMIN SERPL BCP-MCNC: 3.9 G/DL (ref 3.5–5)
ALP SERPL-CCNC: 66 U/L (ref 46–116)
ALT SERPL W P-5'-P-CCNC: 21 U/L (ref 12–78)
ANION GAP SERPL CALCULATED.3IONS-SCNC: 3 MMOL/L (ref 4–13)
AST SERPL W P-5'-P-CCNC: 14 U/L (ref 5–45)
BASOPHILS # BLD AUTO: 0.04 THOUSANDS/ÂΜL (ref 0–0.1)
BASOPHILS NFR BLD AUTO: 1 % (ref 0–1)
BILIRUB SERPL-MCNC: 0.52 MG/DL (ref 0.2–1)
BUN SERPL-MCNC: 19 MG/DL (ref 5–25)
CALCIUM SERPL-MCNC: 8.5 MG/DL (ref 8.3–10.1)
CHLORIDE SERPL-SCNC: 110 MMOL/L (ref 96–108)
CHOLEST SERPL-MCNC: 241 MG/DL
CO2 SERPL-SCNC: 24 MMOL/L (ref 21–32)
CREAT SERPL-MCNC: 0.61 MG/DL (ref 0.6–1.3)
EOSINOPHIL # BLD AUTO: 0.27 THOUSAND/ÂΜL (ref 0–0.61)
EOSINOPHIL NFR BLD AUTO: 4 % (ref 0–6)
ERYTHROCYTE [DISTWIDTH] IN BLOOD BY AUTOMATED COUNT: 12.8 % (ref 11.6–15.1)
EST. AVERAGE GLUCOSE BLD GHB EST-MCNC: 120 MG/DL
GFR SERPL CREATININE-BSD FRML MDRD: 103 ML/MIN/1.73SQ M
GLUCOSE P FAST SERPL-MCNC: 126 MG/DL (ref 65–99)
HBA1C MFR BLD: 5.8 %
HCT VFR BLD AUTO: 40.5 % (ref 34.8–46.1)
HDLC SERPL-MCNC: 43 MG/DL
HGB BLD-MCNC: 13.6 G/DL (ref 11.5–15.4)
IMM GRANULOCYTES # BLD AUTO: 0.02 THOUSAND/UL (ref 0–0.2)
IMM GRANULOCYTES NFR BLD AUTO: 0 % (ref 0–2)
LDLC SERPL CALC-MCNC: 173 MG/DL (ref 0–100)
LYMPHOCYTES # BLD AUTO: 2.22 THOUSANDS/ÂΜL (ref 0.6–4.47)
LYMPHOCYTES NFR BLD AUTO: 34 % (ref 14–44)
MCH RBC QN AUTO: 27.2 PG (ref 26.8–34.3)
MCHC RBC AUTO-ENTMCNC: 33.6 G/DL (ref 31.4–37.4)
MCV RBC AUTO: 81 FL (ref 82–98)
MONOCYTES # BLD AUTO: 0.48 THOUSAND/ÂΜL (ref 0.17–1.22)
MONOCYTES NFR BLD AUTO: 7 % (ref 4–12)
NEUTROPHILS # BLD AUTO: 3.55 THOUSANDS/ÂΜL (ref 1.85–7.62)
NEUTS SEG NFR BLD AUTO: 54 % (ref 43–75)
NONHDLC SERPL-MCNC: 198 MG/DL
NRBC BLD AUTO-RTO: 0 /100 WBCS
PLATELET # BLD AUTO: 248 THOUSANDS/UL (ref 149–390)
PMV BLD AUTO: 11.2 FL (ref 8.9–12.7)
POTASSIUM SERPL-SCNC: 3.4 MMOL/L (ref 3.5–5.3)
PROT SERPL-MCNC: 7.6 G/DL (ref 6.4–8.4)
RBC # BLD AUTO: 5 MILLION/UL (ref 3.81–5.12)
SODIUM SERPL-SCNC: 137 MMOL/L (ref 135–147)
TRIGL SERPL-MCNC: 127 MG/DL
TSH SERPL DL<=0.05 MIU/L-ACNC: 3.29 UIU/ML (ref 0.45–4.5)
WBC # BLD AUTO: 6.58 THOUSAND/UL (ref 4.31–10.16)

## 2023-06-03 PROCEDURE — 80053 COMPREHEN METABOLIC PANEL: CPT

## 2023-06-03 PROCEDURE — 85025 COMPLETE CBC W/AUTO DIFF WBC: CPT

## 2023-06-03 PROCEDURE — 84443 ASSAY THYROID STIM HORMONE: CPT

## 2023-06-03 PROCEDURE — 83036 HEMOGLOBIN GLYCOSYLATED A1C: CPT

## 2023-06-03 PROCEDURE — 80061 LIPID PANEL: CPT

## 2023-06-03 PROCEDURE — 86803 HEPATITIS C AB TEST: CPT

## 2023-06-03 PROCEDURE — 36415 COLL VENOUS BLD VENIPUNCTURE: CPT

## 2023-06-03 PROCEDURE — 87389 HIV-1 AG W/HIV-1&-2 AB AG IA: CPT

## 2023-06-04 LAB
HCV AB SER QL: NORMAL
HIV 1+2 AB+HIV1 P24 AG SERPL QL IA: NORMAL
HIV 2 AB SERPL QL IA: NORMAL
HIV1 AB SERPL QL IA: NORMAL
HIV1 P24 AG SERPL QL IA: NORMAL

## 2023-06-06 ENCOUNTER — HOSPITAL ENCOUNTER (OUTPATIENT)
Dept: RADIOLOGY | Age: 53
Discharge: HOME | End: 2023-06-06
Payer: COMMERCIAL

## 2023-06-06 DIAGNOSIS — Z12.31 ENCOUNTER FOR SCREENING MAMMOGRAM FOR MALIGNANT NEOPLASM OF BREAST: ICD-10-CM

## 2023-06-06 PROCEDURE — 77063 BREAST TOMOSYNTHESIS BI: CPT

## 2023-06-06 PROCEDURE — 77067 SCR MAMMO BI INCL CAD: CPT

## 2023-06-07 ENCOUNTER — OFFICE VISIT (OUTPATIENT)
Dept: FAMILY MEDICINE CLINIC | Facility: CLINIC | Age: 53
End: 2023-06-07

## 2023-06-07 VITALS
TEMPERATURE: 97.5 F | WEIGHT: 178.2 LBS | RESPIRATION RATE: 16 BRPM | OXYGEN SATURATION: 98 % | DIASTOLIC BLOOD PRESSURE: 90 MMHG | SYSTOLIC BLOOD PRESSURE: 160 MMHG | BODY MASS INDEX: 33.64 KG/M2 | HEART RATE: 79 BPM | HEIGHT: 61 IN

## 2023-06-07 DIAGNOSIS — E78.5 DYSLIPIDEMIA: ICD-10-CM

## 2023-06-07 DIAGNOSIS — I10 HYPERTENSION, ESSENTIAL, BENIGN: ICD-10-CM

## 2023-06-07 DIAGNOSIS — E87.6 HYPOKALEMIA: ICD-10-CM

## 2023-06-07 DIAGNOSIS — R73.01 IFG (IMPAIRED FASTING GLUCOSE): ICD-10-CM

## 2023-06-07 DIAGNOSIS — Z00.00 ROUTINE PHYSICAL EXAMINATION: Primary | ICD-10-CM

## 2023-06-07 RX ORDER — POTASSIUM CHLORIDE 750 MG/1
10 CAPSULE, EXTENDED RELEASE ORAL DAILY
Qty: 30 CAPSULE | Refills: 5 | Status: SHIPPED | OUTPATIENT
Start: 2023-06-07

## 2023-06-07 RX ORDER — POTASSIUM CHLORIDE 750 MG/1
10 CAPSULE, EXTENDED RELEASE ORAL 2 TIMES DAILY
Qty: 30 CAPSULE | Refills: 5 | Status: SHIPPED | OUTPATIENT
Start: 2023-06-07 | End: 2023-06-07 | Stop reason: SDUPTHER

## 2023-06-07 NOTE — PATIENT INSTRUCTIONS
Take Atorvastatin in the evening  Recheck lipid panel blood work in approximately 12 weeks   Start Potassium supplement one tab daily   Recheck potassium level when cholesterol is rechecked

## 2023-06-07 NOTE — ASSESSMENT & PLAN NOTE
Potassium 3 3, then 3 4   Pt states she does not enjoy potassium rich foods  To start Potassium 10 mEq one tab daily   Repeat lab work as ordered

## 2023-06-07 NOTE — PROGRESS NOTES
[facility] [[email or web address]] PRACTICE    NAME: Jacquelin Villegas  AGE: 46 y o  SEX: female  : 1970     DATE: 2023     Assessment and Plan:     Problem List Items Addressed This Visit        Endocrine    IFG (impaired fasting glucose)     A1C at 5 8  Dietary modifications reviewed today  Encouraged to work on weight loss            Cardiovascular and Mediastinum    Hypertension, essential, benign     Pt notes being quite anxious today due to her blood work results  Home BP reportedly running 140s/80s  I asked pt to bring her BP log to her Cardiology follow up this Friday  Continue Lisinopril 20 mg daily and Amlodipine 5 mg daily   Follow a low sodium diet                 Other    Dyslipidemia     Atorvastatin just restarted 2 weeks ago  Continue with this and repeat lipid panel in 12 weeks  Also continue Lovaza          Relevant Orders    Comprehensive metabolic panel    Lipid panel    Hypokalemia     Potassium 3 3, then 3 4   Pt states she does not enjoy potassium rich foods  To start Potassium 10 mEq one tab daily   Repeat lab work as ordered          Relevant Medications    potassium chloride (MICRO-K) 10 MEQ CR capsule   Other Visit Diagnoses     Routine physical examination    -  Primary          Immunizations and preventive care screenings were discussed with patient today  Appropriate education was printed on patient's after visit summary  Counseling:  Alcohol/drug use: discussed moderation in alcohol intake, the recommendations for healthy alcohol use, and avoidance of illicit drug use  Dental Health: discussed importance of regular tooth brushing, flossing, and dental visits  Injury prevention: discussed safety/seat belts, safety helmets, smoke detectors, carbon dioxide detectors, and smoking near bedding or upholstery    Sexual health: discussed sexually transmitted diseases, partner selection, use of condoms, avoidance of unintended pregnancy, and contraceptive alternatives  · Exercise: the importance of regular exercise/physical activity was discussed  Recommend exercise 3-5 times per week for at least 30 minutes  No follow-ups on file  Chief Complaint:     Chief Complaint   Patient presents with   • Physical Exam      History of Present Illness:     Adult Annual Physical   Patient here for a comprehensive physical exam  The patient reports no problems  IFG- recent , a1C at 5 8 (previously at 6 0 in 2021)  Not a big sweets eater but enjoys bread    HLD- just restarted on atorvastatin 40 mg daily 2 weeks ago  Lipid panel was to be completed in 12 weeks but was completed now  / / HDL 43/   She now notes compliance with the atorvastatin and Lovaza     HTN- now taking Lisinopril and Amlodipine, stopped Labetalol on her own  Overdue for Cardiology follow up which is scheduled for this Friday  Pt notes her home BP is running 140s/80s but she forgot her BP log to review today  She notes feeling anxious being here today because of her lab results     Diet and Physical Activity  · Diet/Nutrition: poor diet  · Exercise: no formal exercise  Depression Screening  PHQ-2/9 Depression Screening         General Health  · Sleep: sleeps well  · Hearing: normal - bilateral   · Vision: no vision problems  · Dental: regular dental visits  /GYN Health  · Patient is: postmenopausal  · Last menstrual period: N/A  · Contraceptive method: N/A  Review of Systems:     Review of Systems   Constitutional: Negative for chills and fever  HENT: Negative for ear pain and sore throat  Eyes: Negative for pain and visual disturbance  Respiratory: Negative for cough, shortness of breath and wheezing  Cardiovascular: Negative for chest pain, palpitations and leg swelling  Gastrointestinal: Negative for abdominal pain, constipation, diarrhea, nausea and vomiting     Genitourinary: Negative for dysuria and hematuria  Musculoskeletal: Negative for arthralgias and back pain  Skin: Negative for color change and rash  Neurological: Negative for seizures and syncope  Hematological: Negative for adenopathy  Does not bruise/bleed easily  Psychiatric/Behavioral: Negative for self-injury, sleep disturbance and suicidal ideas  The patient is not nervous/anxious  All other systems reviewed and are negative       Past Medical History:     Past Medical History:   Diagnosis Date   • Cardiac disorder    • Congenital heart defect     last assessed 3/3/15, resolved 3/3/15   • Endometrial polyp       Past Surgical History:     Past Surgical History:   Procedure Laterality Date   • CARDIAC SURGERY      valve repair    •  SECTION      x 2   • ENDOMETRIAL BIOPSY      by suction    • FOOT SURGERY Left    • INSERTION OF INTRAUTERINE DEVICE (IUD)     • REMOVAL OF INTRAUTERINE DEVICE (IUD)        Social History:     Social History     Socioeconomic History   • Marital status: /Civil Union     Spouse name: Not on file   • Number of children: Not on file   • Years of education: Not on file   • Highest education level: Not on file   Occupational History   • Not on file   Tobacco Use   • Smoking status: Never   • Smokeless tobacco: Never   Vaping Use   • Vaping Use: Never used   Substance and Sexual Activity   • Alcohol use: No   • Drug use: No   • Sexual activity: Yes     Partners: Male     Birth control/protection: None     Comment:    Other Topics Concern   • Not on file   Social History Narrative    Daily coffee consumption, (1 cup/day)     Sedentary lifestyle      Social Determinants of Health     Financial Resource Strain: Low Risk  (2021)    Overall Financial Resource Strain (CARDIA)    • Difficulty of Paying Living Expenses: Not hard at all   Food Insecurity: No Food Insecurity (2021)    Hunger Vital Sign    • Worried About Running Out of Food in the Last Year: Never true    • Ran Out of Food in the Last Year: Never true   Transportation Needs: No Transportation Needs (5/18/2021)    PRAPARE - Transportation    • Lack of Transportation (Medical): No    • Lack of Transportation (Non-Medical): No   Physical Activity: Inactive (9/18/2019)    Exercise Vital Sign    • Days of Exercise per Week: 0 days    • Minutes of Exercise per Session: 0 min   Stress: Stress Concern Present (9/18/2019)    [address]    • Feeling of Stress :  To some extent   Social Connections: Unknown (9/18/2019)    Social Connection and Isolation Panel [NHANES]    • Frequency of Communication with Friends and Family: Patient refused    • Frequency of Social Gatherings with Friends and Family: Patient refused    • Attends Mosque Services: Patient refused    • Active Member of Clubs or Organizations: Patient refused    • Attends Club or Organization Meetings: Patient refused    • Marital Status: Patient refused   Intimate Partner Violence: Unknown (9/18/2019)    Humiliation, Afraid, Rape, and Kick questionnaire    • Fear of Current or Ex-Partner: Patient refused    • Emotionally Abused: Patient refused    • Physically Abused: Patient refused    • Sexually Abused: Patient refused   Housing Stability: Not on file      Family History:     Family History   Problem Relation Age of Onset   • Heart disease Mother    • No Known Problems Father    • Heart disease Maternal Grandmother    • Coronary artery disease Family    • No Known Problems Sister    • No Known Problems Maternal Grandfather    • No Known Problems Paternal Grandmother    • No Known Problems Paternal Grandfather    • No Known Problems Sister    • No Known Problems Maternal Aunt    • No Known Problems Maternal Aunt    • No Known Problems Maternal Aunt    • No Known Problems Maternal Aunt    • No Known Problems Paternal Aunt    • No Known Problems Paternal Aunt    • No Known Problems Paternal Aunt    • No Known "Problems Paternal Aunt       Current Medications:     Current Outpatient Medications   Medication Sig Dispense Refill   • amLODIPine (NORVASC) 5 mg tablet Take 1 tablet (5 mg total) by mouth daily 90 tablet 1   • aspirin (ECOTRIN LOW STRENGTH) 81 mg EC tablet Take 81 mg by mouth daily     • atorvastatin (LIPITOR) 40 mg tablet Take 1 tablet (40 mg total) by mouth daily with dinner 90 tablet 1   • lisinopril (ZESTRIL) 20 mg tablet Take 1 tablet (20 mg total) by mouth daily 90 tablet 1   • omega-3-acid ethyl esters (LOVAZA) 1 g capsule Take 2 capsules (2 g total) by mouth 2 (two) times a day 360 capsule 1   • potassium chloride (MICRO-K) 10 MEQ CR capsule Take 1 capsule (10 mEq total) by mouth daily 30 capsule 5   • albuterol (Ventolin HFA) 90 mcg/act inhaler Inhale 2 puffs every 6 (six) hours as needed for wheezing (Patient not taking: Reported on 5/24/2023) 18 g 5     No current facility-administered medications for this visit  Allergies:     No Known Allergies   Physical Exam:     /90 (BP Location: Left arm, Patient Position: Sitting, Cuff Size: Adult)   Pulse 79   Temp 97 5 °F (36 4 °C) (Temporal)   Resp 16   Ht 5' 1\" (1 549 m)   Wt 80 8 kg (178 lb 3 2 oz)   LMP 02/01/2019 (Approximate)   SpO2 98%   BMI 33 67 kg/m²     Physical Exam  Vitals and nursing note reviewed  Constitutional:       General: She is not in acute distress  Appearance: She is well-developed  She is obese  She is not ill-appearing, toxic-appearing or diaphoretic  HENT:      Head: Normocephalic and atraumatic  Eyes:      Extraocular Movements: Extraocular movements intact  Conjunctiva/sclera: Conjunctivae normal       Pupils: Pupils are equal, round, and reactive to light  Neck:      Vascular: No carotid bruit  Cardiovascular:      Rate and Rhythm: Normal rate and regular rhythm  Heart sounds: Normal heart sounds  No murmur heard    Pulmonary:      Effort: Pulmonary effort is normal  No respiratory " distress  Breath sounds: Normal breath sounds  Abdominal:      General: Bowel sounds are normal  There is no distension  Palpations: Abdomen is soft  Tenderness: There is no abdominal tenderness  Musculoskeletal:         General: No swelling  Cervical back: Normal range of motion and neck supple  No rigidity or tenderness  Lymphadenopathy:      Cervical: No cervical adenopathy  Skin:     General: Skin is warm and dry  Capillary Refill: Capillary refill takes less than 2 seconds  Neurological:      General: No focal deficit present  Mental Status: She is alert and oriented to person, place, and time  Psychiatric:         Mood and Affect: Mood normal          Behavior: Behavior normal          Thought Content:  Thought content normal          Judgment: Judgment normal           Keiko Murrell, [address] [facility] [facility] [address]

## 2023-06-07 NOTE — PROGRESS NOTES
Name: Calvin Randhawa      : 1970      MRN: [id number]  Encounter Provider: HARLEY Cruz  Encounter Date: 2023   Encounter department: [address] [place] [address] [place]      {There are no diagnoses linked to this encounter   (Refresh or delete this SmartLink)}       Subjective     HPI  Review of Systems    Past Medical History:   Diagnosis Date   • Cardiac disorder    • Congenital heart defect     last assessed 3/3/15, resolved 3/3/15   • Endometrial polyp      Past Surgical History:   Procedure Laterality Date   • CARDIAC SURGERY      valve repair    •  SECTION      x 2   • ENDOMETRIAL BIOPSY      by suction    • FOOT SURGERY Left    • INSERTION OF INTRAUTERINE DEVICE (IUD)     • REMOVAL OF INTRAUTERINE DEVICE (IUD)       Family History   Problem Relation Age of Onset   • Heart disease Mother    • No Known Problems Father    • Heart disease Maternal Grandmother    • Coronary artery disease Family    • No Known Problems Sister    • No Known Problems Maternal Grandfather    • No Known Problems Paternal Grandmother    • No Known Problems Paternal Grandfather    • No Known Problems Sister    • No Known Problems Maternal Aunt    • No Known Problems Maternal Aunt    • No Known Problems Maternal Aunt    • No Known Problems Maternal Aunt    • No Known Problems Paternal Aunt    • No Known Problems Paternal Aunt    • No Known Problems Paternal Aunt    • No Known Problems Paternal Aunt      Social History     Socioeconomic History   • Marital status: /Civil Union     Spouse name: Not on file   • Number of children: Not on file   • Years of education: Not on file   • Highest education level: Not on file   Occupational History   • Not on file   Tobacco Use   • Smoking status: Never   • Smokeless tobacco: Never   Vaping Use   • Vaping Use: Never used   Substance and Sexual Activity   • Alcohol use: No   • Drug use: No   • Sexual activity: Yes     Partners: Male     Birth control/protection: None     Comment:    Other Topics Concern   • Not on file   Social History Narrative    Daily coffee consumption, (1 cup/day)     Sedentary lifestyle      Social Determinants of Health     Financial Resource Strain: Low Risk  (5/18/2021)    Overall Financial Resource Strain (CARDIA)    • Difficulty of Paying Living Expenses: Not hard at all   Food Insecurity: No Food Insecurity (5/18/2021)    Hunger Vital Sign    • Worried About Running Out of Food in the Last Year: Never true    • Ran Out of Food in the Last Year: Never true   Transportation Needs: No Transportation Needs (5/18/2021)    PRAPARE - Transportation    • Lack of Transportation (Medical): No    • Lack of Transportation (Non-Medical): No   Physical Activity: Inactive (9/18/2019)    Exercise Vital Sign    • Days of Exercise per Week: 0 days    • Minutes of Exercise per Session: 0 min   Stress: Stress Concern Present (9/18/2019)    [address]    • Feeling of Stress :  To some extent   Social Connections: Unknown (9/18/2019)    Social Connection and Isolation Panel [NHANES]    • Frequency of Communication with Friends and Family: Patient refused    • Frequency of Social Gatherings with Friends and Family: Patient refused    • Attends [employer] Services: Patient refused    • Active Member of Clubs or Organizations: Patient refused    • Attends Club or Organization Meetings: Patient refused    • Marital Status: Patient refused   Intimate Partner Violence: Unknown (9/18/2019)    Humiliation, Afraid, Rape, and Kick questionnaire    • Fear of Current or Ex-Partner: Patient refused    • Emotionally Abused: Patient refused    • Physically Abused: Patient refused    • Sexually Abused: Patient refused   Housing Stability: Not on file     Current Outpatient Medications on File Prior to Visit   Medication Sig   • amLODIPine (NORVASC) 5 mg tablet Take 1 tablet (5 mg total) by "mouth daily   • aspirin (ECOTRIN LOW STRENGTH) 81 mg EC tablet Take 81 mg by mouth daily   • atorvastatin (LIPITOR) 40 mg tablet Take 1 tablet (40 mg total) by mouth daily with dinner   • lisinopril (ZESTRIL) 20 mg tablet Take 1 tablet (20 mg total) by mouth daily   • omega-3-acid ethyl esters (LOVAZA) 1 g capsule Take 2 capsules (2 g total) by mouth 2 (two) times a day   • albuterol (Ventolin HFA) 90 mcg/act inhaler Inhale 2 puffs every 6 (six) hours as needed for wheezing (Patient not taking: Reported on 5/24/2023)     No Known Allergies  Immunization History   Administered Date(s) Administered   • COVID-19 PFIZER VACCINE 0 3 ML IM 04/03/2021, 04/25/2021, 12/11/2021   • Tdap 01/01/2015, 03/12/2015       Objective     /90 (BP Location: Left arm, Patient Position: Sitting, Cuff Size: Adult)   Pulse 79   Temp 97 5 °F (36 4 °C) (Temporal)   Resp 16   Ht 5' 1\" (1 549 m)   Wt 80 8 kg (178 lb 3 2 oz)   LMP 02/01/2019 (Approximate)   SpO2 98%   BMI 33 67 kg/m²     Physical Exam  Curvin Seashore, HARLEY  "

## 2023-06-07 NOTE — ASSESSMENT & PLAN NOTE
Pt notes being quite anxious today due to her blood work results  Home BP reportedly running 140s/80s  I asked pt to bring her BP log to her Cardiology follow up this Friday  Continue Lisinopril 20 mg daily and Amlodipine 5 mg daily   Follow a low sodium diet

## 2023-06-09 ENCOUNTER — OFFICE VISIT (OUTPATIENT)
Dept: CARDIOLOGY CLINIC | Facility: CLINIC | Age: 53
End: 2023-06-09
Payer: COMMERCIAL

## 2023-06-09 VITALS
BODY MASS INDEX: 33.25 KG/M2 | OXYGEN SATURATION: 95 % | SYSTOLIC BLOOD PRESSURE: 130 MMHG | WEIGHT: 176 LBS | DIASTOLIC BLOOD PRESSURE: 80 MMHG | HEART RATE: 78 BPM

## 2023-06-09 DIAGNOSIS — Q21.11 ASD SECUNDUM: Primary | Chronic | ICD-10-CM

## 2023-06-09 DIAGNOSIS — E78.5 DYSLIPIDEMIA: ICD-10-CM

## 2023-06-09 DIAGNOSIS — I10 HYPERTENSION, ESSENTIAL, BENIGN: ICD-10-CM

## 2023-06-09 PROCEDURE — 99213 OFFICE O/P EST LOW 20 MIN: CPT | Performed by: INTERNAL MEDICINE

## 2023-06-09 NOTE — PROGRESS NOTES
Cardiology Follow Up    Romaine Oar  1970  [id number]  [address] [place] [address],[place] [address] [facility]  [address]  [address]  [phone]    1  ASD secundum        2  Hypertension, essential, benign        3  Dyslipidemia            Interval History: Cardiology follow-up  Patient was seen last by myself in 2019  Patient was recently seen in emergency room with severe headache  CTA of the head and neck revealed questionable distal right vertebral stenosis versus FMD versus dissection there was no CVA  Her blood pressure was elevated the patient states that she has been compliant with low-cholesterol diet and low-sodium diet, she was not taking statin therapy  Lipids total cholesterol 241 HDL 43 , she is now on high intensity statin therapy she current denies any cardiac symptoms including chest pain or dyspnea  No syncope or presyncope  She is concerned about her sugars    Patient Active Problem List   Diagnosis   • ASD secundum   • Dyslipidemia   • Hypertension, essential, benign   • Menorrhagia   • Valvular disease   • Obesity (BMI 30 0-34  9)   • Hypokalemia   • Hypothyroidism   • Vitamin D deficiency   • IFG (impaired fasting glucose)   • Leg swelling     Past Medical History:   Diagnosis Date   • Cardiac disorder    • Congenital heart defect     last assessed 3/3/15, resolved 3/3/15   • Endometrial polyp      Social History     Socioeconomic History   • Marital status: /Civil Union     Spouse name: Not on file   • Number of children: Not on file   • Years of education: Not on file   • Highest education level: Not on file   Occupational History   • Not on file   Tobacco Use   • Smoking status: Never   • Smokeless tobacco: Never   Vaping Use   • Vaping Use: Never used   Substance and Sexual Activity   • Alcohol use: No   • Drug use: No   • Sexual activity: Yes     Partners: Male     Birth control/protection: None Comment:    Other Topics Concern   • Not on file   Social History Narrative    Daily coffee consumption, (1 cup/day)     Sedentary lifestyle      Social Determinants of Health     Financial Resource Strain: Low Risk  (5/18/2021)    Overall Financial Resource Strain (CARDIA)    • Difficulty of Paying Living Expenses: Not hard at all   Food Insecurity: No Food Insecurity (5/18/2021)    Hunger Vital Sign    • Worried About Running Out of Food in the Last Year: Never true    • Ran Out of Food in the Last Year: Never true   Transportation Needs: No Transportation Needs (5/18/2021)    PRAPARE - Transportation    • Lack of Transportation (Medical): No    • Lack of Transportation (Non-Medical): No   Physical Activity: Inactive (9/18/2019)    Exercise Vital Sign    • Days of Exercise per Week: 0 days    • Minutes of Exercise per Session: 0 min   Stress: Stress Concern Present (9/18/2019)    [address]    • Feeling of Stress :  To some extent   Social Connections: Unknown (9/18/2019)    Social Connection and Isolation Panel [NHANES]    • Frequency of Communication with Friends and Family: Patient refused    • Frequency of Social Gatherings with Friends and Family: Patient refused    • Attends Latter-day Services: Patient refused    • Active Member of Clubs or Organizations: Patient refused    • Attends Club or Organization Meetings: Patient refused    • Marital Status: Patient refused   Intimate Partner Violence: Unknown (9/18/2019)    Humiliation, Afraid, Rape, and Kick questionnaire    • Fear of Current or Ex-Partner: Patient refused    • Emotionally Abused: Patient refused    • Physically Abused: Patient refused    • Sexually Abused: Patient refused   Housing Stability: Not on file      Family History   Problem Relation Age of Onset   • Heart disease Mother    • No Known Problems Father    • Heart disease Maternal Grandmother    • Coronary artery disease Family    • No Known Problems Sister    • No Known Problems Maternal Grandfather    • No Known Problems Paternal Grandmother    • No Known Problems Paternal Grandfather    • No Known Problems Sister    • No Known Problems Maternal Aunt    • No Known Problems Maternal Aunt    • No Known Problems Maternal Aunt    • No Known Problems Maternal Aunt    • No Known Problems Paternal Aunt    • No Known Problems Paternal Aunt    • No Known Problems Paternal Aunt    • No Known Problems Paternal Aunt      Past Surgical History:   Procedure Laterality Date   • CARDIAC SURGERY      valve repair    •  SECTION      x 2   • ENDOMETRIAL BIOPSY      by suction    • FOOT SURGERY Left    • INSERTION OF INTRAUTERINE DEVICE (IUD)     • REMOVAL OF INTRAUTERINE DEVICE (IUD)         Current Outpatient Medications:   •  amLODIPine (NORVASC) 5 mg tablet, Take 1 tablet (5 mg total) by mouth daily, Disp: 90 tablet, Rfl: 1  •  aspirin (ECOTRIN LOW STRENGTH) 81 mg EC tablet, Take 81 mg by mouth daily, Disp: , Rfl:   •  atorvastatin (LIPITOR) 40 mg tablet, Take 1 tablet (40 mg total) by mouth daily with dinner, Disp: 90 tablet, Rfl: 1  •  lisinopril (ZESTRIL) 20 mg tablet, Take 1 tablet (20 mg total) by mouth daily, Disp: 90 tablet, Rfl: 1  •  omega-3-acid ethyl esters (LOVAZA) 1 g capsule, Take 2 capsules (2 g total) by mouth 2 (two) times a day, Disp: 360 capsule, Rfl: 1  •  potassium chloride (MICRO-K) 10 MEQ CR capsule, Take 1 capsule (10 mEq total) by mouth daily, Disp: 30 capsule, Rfl: 5  •  albuterol (Ventolin HFA) 90 mcg/act inhaler, Inhale 2 puffs every 6 (six) hours as needed for wheezing (Patient not taking: Reported on 2023), Disp: 18 g, Rfl: 5  No Known Allergies    Labs:  Appointment on 2023   Component Date Value   • TSH 3RD GENERATON 2023 3  288    • WBC 2023 6 58    • RBC 2023 5 00    • Hemoglobin 2023 13 6    • Hematocrit 2023 40 5    • MCV 2023 81 (L)    • MCH 06/03/2023 27 2    • MCHC 06/03/2023 33 6    • RDW 06/03/2023 12 8    • MPV 06/03/2023 11 2    • Platelets 76/21/9686 248    • nRBC 06/03/2023 0    • Neutrophils Relative 06/03/2023 54    • Immat GRANS % 06/03/2023 0    • Lymphocytes Relative 06/03/2023 34    • Monocytes Relative 06/03/2023 7    • Eosinophils Relative 06/03/2023 4    • Basophils Relative 06/03/2023 1    • Neutrophils Absolute 06/03/2023 3 55    • Immature Grans Absolute 06/03/2023 0 02    • Lymphocytes Absolute 06/03/2023 2 22    • Monocytes Absolute 06/03/2023 0 48    • Eosinophils Absolute 06/03/2023 0 27    • Basophils Absolute 06/03/2023 0 04    • Sodium 06/03/2023 137    • Potassium 06/03/2023 3 4 (L)    • Chloride 06/03/2023 110 (H)    • CO2 06/03/2023 24    • ANION GAP 06/03/2023 3 (L)    • BUN 06/03/2023 19    • Creatinine 06/03/2023 0 61    • Glucose, Fasting 06/03/2023 126 (H)    • Calcium 06/03/2023 8 5    • AST 06/03/2023 14    • ALT 06/03/2023 21    • Alkaline Phosphatase 06/03/2023 66    • Total Protein 06/03/2023 7 6    • Albumin 06/03/2023 3 9    • Total Bilirubin 06/03/2023 0 52    • eGFR 06/03/2023 103    • Cholesterol 06/03/2023 241 (H)    • Triglycerides 06/03/2023 127    • HDL, Direct 06/03/2023 43 (L)    • LDL Calculated 06/03/2023 173 (H)    • Non-HDL-Chol (CHOL-HDL) 06/03/2023 198    • Hepatitis C Ab 06/03/2023 Non-reactive    • Hemoglobin A1C 06/03/2023 5 8 (H)    • EAG 06/03/2023 120    • HIV-1 p24 Antigen 06/03/2023 Non-Reactive    • HIV-1 Antibody 06/03/2023 Non-Reactive    • HIV-2 Antibody 06/03/2023 Non-Reactive    • HIV Ag-Ab 5th Gen 06/03/2023 Non-Reactive    Admission on 05/22/2023, Discharged on 05/22/2023   Component Date Value   • Ventricular Rate 05/22/2023 72    • Atrial Rate 05/22/2023 72    • OR Interval 05/22/2023 182    • QRSD Interval 05/22/2023 122    • QT Interval 05/22/2023 448    • QTC Interval 05/22/2023 490    • P Axis 05/22/2023 24    • QRS Axis 05/22/2023 38    • T Wave Axis 05/22/2023 17    • Sodium 05/22/2023 133 (L)    • Potassium 05/22/2023 3 3 (L)    • Chloride 05/22/2023 108    • CO2 05/22/2023 24    • ANION GAP 05/22/2023 1 (L)    • BUN 05/22/2023 15    • Creatinine 05/22/2023 0 63    • Glucose 05/22/2023 131    • Calcium 05/22/2023 9 0    • AST 05/22/2023 20    • ALT 05/22/2023 25    • Alkaline Phosphatase 05/22/2023 68    • Total Protein 05/22/2023 8 0    • Albumin 05/22/2023 3 9    • Total Bilirubin 05/22/2023 0 38    • eGFR 05/22/2023 102      Imaging: Mammo screening bilateral w 3d & cad    Result Date: 6/6/2023  Narrative: DIAGNOSIS: Encounter for screening mammogram for malignant neoplasm of breast TECHNIQUE: Digital screening mammography was performed  Computer Aided Detection (CAD) analyzed all applicable images  COMPARISONS: Prior breast imaging dated: 01/31/2022, 02/19/2020, 02/03/2020, 11/19/2018, 10/05/2017, and 02/16/2015 RELEVANT HISTORY: Family Breast Cancer History: No known family history of breast cancer  Family Medical History: No known relevant family medical history  Personal History: No known relevant hormone history  No known relevant surgical history  No known relevant medical history  The patient is scheduled in a reminder system for screening mammography  8-10% of cancers will be missed on mammography  Management of a palpable abnormality must be based on clinical grounds  Patients will be notified of their results via letter from our facility  Accredited by [employer] of Radiology and FDA  RISK ASSESSMENT: 5 Year Tyrer-Cuzick: 0 55 % 10 Year Tyrer-Cuzick: 1 21 % Lifetime Tyrer-Cuzick: 4 39 % TISSUE DENSITY: There are scattered areas of fibroglandular density  INDICATION: Agatha Cervantes is a 46 y o  female presenting for screening mammography  FINDINGS: Bilateral There are no suspicious masses, grouped microcalcifications or areas of unexplained architectural distortion  The skin and nipple areolar complex are unremarkable        Impression: No mammographic evidence of malignancy  ASSESSMENT/BI-RADS CATEGORY: Left: 2 - Benign Right: 2 - Benign Overall: 2 - Benign RECOMMENDATION:      - Routine screening mammogram in 1 year for both breasts  Workstation ID: [id number]     CTA head and neck with and without contrast    Result Date: 5/22/2023  Narrative: CTA NECK AND BRAIN WITH AND WITHOUT CONTRAST INDICATION: Vertebral artery dissection suspected acute onset headache, neck pain, r/o SAH vs vertebral artery dissection COMPARISON:   Head CT from February 2, 2022 TECHNIQUE:  Routine CT imaging of the Brain without contrast   Post contrast imaging was performed after administration of iodinated contrast through the neck and brain  Post contrast axial 0 625 mm images timed to opacify the arterial system  3D rendering was performed on an independent workstation  MIP reconstructions performed  Coronal reconstructions were performed of the noncontrast portion of the brain  Radiation dose length product (DLP) for this visit:  1406 4 mGy-cm   This examination, like all CT scans performed in the [facility], was performed utilizing techniques to minimize radiation dose exposure, including the use of iterative  reconstruction and automated exposure control  IV Contrast:  100 mL of iohexol (OMNIPAQUE) IMAGE QUALITY:   Diagnostic FINDINGS: NONCONTRAST BRAIN PARENCHYMA: No acute parenchymal abnormality  Focal patchy hypodensity in the left frontal subcortical white matter (2:18) may represent an area of chronic small vessel ischemic change  This is stable when comparing to the prior examination from February 2, 2022  VENTRICLES AND EXTRA-AXIAL SPACES:  Normal for the patient's age  VISUALIZED ORBITS: Normal visualized orbits  PARANASAL SINUSES: Normal visualized paranasal sinuses  CERVICAL VASCULATURE AORTIC ARCH AND GREAT VESSELS:  Normal aortic arch and great vessel origins  Normal visualized subclavian vessels  RIGHT VERTEBRAL ARTERY CERVICAL SEGMENT:  Normal origin   The vessel proximal and mid cervical vertebral artery segments are normal in caliber throughout the neck  There is mild vessel wall irregularity and luminal narrowing within the distal right cervical vertebral artery as it the foramen transversarium at the C2 level (2:195-923)  This only extends to the proximal V3 segment with a more normal-appearing mid to distal V3 segment  No dissection flap or aneurysm is identified  This is the nondominant vessel  LEFT VERTEBRAL ARTERY CERVICAL SEGMENT:  Normal origin  The vessel is normal in caliber throughout the neck  This is the dominant vessel  RIGHT EXTRACRANIAL CAROTID SEGMENT:  Normal caliber common carotid artery  Normal bifurcation and cervical internal carotid artery  No stenosis or dissection  LEFT EXTRACRANIAL CAROTID SEGMENT:  Normal caliber common carotid artery  Normal bifurcation and cervical internal carotid artery  No stenosis or dissection  NASCET criteria was used to determine the degree of internal carotid artery diameter stenosis  INTRACRANIAL VASCULATURE INTERNAL CAROTID ARTERIES:  Normal enhancement of the intracranial portions of the internal carotid arteries  Normal ophthalmic artery origins  Normal ICA terminus  ANTERIOR CIRCULATION:  Symmetric A1 segments and anterior cerebral arteries with normal enhancement  Normal anterior communicating artery  MIDDLE CEREBRAL ARTERY CIRCULATION:  M1 segment and middle cerebral artery branches demonstrate normal enhancement bilaterally  DISTAL VERTEBRAL ARTERIES: Developmental asymmetry of the intradural vertebral artery segments, of the right is mildly hypoplastic compared to the left  Both intradural vertebral artery segments are patent as are the posterior inferior cerebellar artery origins  The vertebrobasilar junction is unremarkable  Posterior inferior cerebellar artery origins are normal  Normal vertebral basilar junction  BASILAR ARTERY:  Basilar artery is normal in caliber    Normal superior cerebellar arteries  POSTERIOR CEREBRAL ARTERIES: Both posterior cerebral arteries arises from the basilar tip  Both arteries demonstrate normal enhancement  Normal posterior communicating arteries  VENOUS STRUCTURES:  Normal  NON VASCULAR ANATOMY BONY STRUCTURES:  No acute osseous abnormality  Cervical spondylosis at C5-C6 with suggestion of posterior disc osteophyte complex and mild to space narrowing  SOFT TISSUES OF THE NECK:  Unremarkable  THORACIC INLET: Clear lung apices  Postoperative changes of CABG  Impression: No intracranial hemorrhage or cortical infarction  No cervical or intracranial large vessel occlusion  Normal carotid bifurcation  Short segment of mild vessel wall irregularity and narrowing in the distal right cervical vertebral artery as it exits the foramen transversarium (proximal V3 segment)  Differential considerations include fibromuscular dysplasia, mild vasospasm, or type I dissection injury  There is no definable intimal flap or evidence of pseudoaneurysm  Workstation performed: [id number]       Review of Systems:  Review of Systems   Constitutional: Negative for activity change and fatigue  Respiratory: Negative for apnea, shortness of breath, wheezing and stridor  Cardiovascular: Negative for chest pain, palpitations and leg swelling  Neurological: Positive for dizziness and headaches  Negative for syncope and light-headedness  Hematological: Does not bruise/bleed easily  Physical Exam:  Physical Exam  Vitals reviewed  Constitutional:       General: She is not in acute distress  Appearance: Normal appearance  She is obese  She is not ill-appearing, toxic-appearing or diaphoretic  Eyes:      General: No scleral icterus  Neck:      Vascular: No carotid bruit  Cardiovascular:      Rate and Rhythm: Normal rate and regular rhythm  Pulses: Normal pulses  Heart sounds: Normal heart sounds  No murmur heard  No friction rub  No gallop     Pulmonary:      Effort: Pulmonary effort is normal  No respiratory distress  Breath sounds: Normal breath sounds  No stridor  No wheezing, rhonchi or rales  Musculoskeletal:      Right lower leg: No edema  Left lower leg: No edema  Neurological:      Mental Status: She is alert  Psychiatric:         Mood and Affect: Mood normal          Discussion/Summary:     Congenital heart disease  Status post cardiac surgery at age 31  Possibly ASD closure     Cardiac catheterization 2005 revealed normal coronary arteries  Normal right heart pressures  No evidence of a step-up of oxygenation suggested no evidence of any intracardiac shunting echocardiogram 2015 revealed normal left ventricular and right ventricular systolic functions  And no evidence of volume or pressure overload load of the right heart chambers  Stress test 2015 was negative for ischemia  Pharmacological stress test 2022 suggested no ischemia  Ejection fraction 63%  Echocardiogram at that time revealed normal left ventricular function, no identified shunts were noted  Right ventricle was normal as well  Patient admission with hypertension, improved control  Renal artery duplex previously revealed no stenosis  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  Continue current medications, the patient scheduled for follow-up CTA based on the abnormalities noted during the last admission  Continue clinical regimen  This note was completed in part utilizing [employer] direct voice recognition software  Grammatical errors, random word insertion, spelling mistakes, and incomplete sentences may be an occasional consequence of the system secondary to software limitations, ambient noise and hardware issues  At the time of dictation, efforts were made to edit, clarify and /or correct errors  Please read the chart carefully and recognize, using context, where substitutions have occurred    If you have any questions or concerns about the context, text or information contained within the body of this dictation, please contact myself, the provider, for further clarification

## 2023-07-03 DIAGNOSIS — E87.6 HYPOKALEMIA: ICD-10-CM

## 2023-07-03 RX ORDER — POTASSIUM CHLORIDE 750 MG/1
CAPSULE, EXTENDED RELEASE ORAL
Qty: 90 CAPSULE | Refills: 2 | Status: SHIPPED | OUTPATIENT
Start: 2023-07-03

## 2023-08-16 ENCOUNTER — TELEPHONE (OUTPATIENT)
Dept: NEUROSURGERY | Facility: CLINIC | Age: 53
End: 2023-08-16

## 2023-08-16 NOTE — TELEPHONE ENCOUNTER
08/16/2023- MY CHART IN BASKET MESSAGE:  Appointment canceled for Mckinley Celaya ([id number])   Visit Type: [facility]   Date        Time      Length    Provider                  Department   8/23/2023   11:30 AM  15 mins. Sri Barros MD         [facility]   8/23/2023   11:00 AM  45 mins. Adriane Ortiz PA-C     [address]      Reason for Cancellation: Canceled [facility] NOTES IN REFERRAL

## 2023-08-21 ENCOUNTER — APPOINTMENT (OUTPATIENT)
Dept: LAB | Facility: CLINIC | Age: 53
End: 2023-08-21
Payer: COMMERCIAL

## 2023-08-21 DIAGNOSIS — E78.5 DYSLIPIDEMIA: ICD-10-CM

## 2023-08-21 LAB
ALBUMIN SERPL BCP-MCNC: 3.8 G/DL (ref 3.5–5)
ALP SERPL-CCNC: 72 U/L (ref 46–116)
ALT SERPL W P-5'-P-CCNC: 26 U/L (ref 12–78)
ANION GAP SERPL CALCULATED.3IONS-SCNC: 5 MMOL/L
AST SERPL W P-5'-P-CCNC: 18 U/L (ref 5–45)
BILIRUB SERPL-MCNC: 0.39 MG/DL (ref 0.2–1)
BUN SERPL-MCNC: 13 MG/DL (ref 5–25)
CALCIUM SERPL-MCNC: 8.7 MG/DL (ref 8.3–10.1)
CHLORIDE SERPL-SCNC: 110 MMOL/L (ref 96–108)
CHOLEST SERPL-MCNC: 251 MG/DL
CO2 SERPL-SCNC: 25 MMOL/L (ref 21–32)
CREAT SERPL-MCNC: 0.7 MG/DL (ref 0.6–1.3)
GFR SERPL CREATININE-BSD FRML MDRD: 98 ML/MIN/1.73SQ M
GLUCOSE P FAST SERPL-MCNC: 122 MG/DL (ref 65–99)
HDLC SERPL-MCNC: 44 MG/DL
LDLC SERPL CALC-MCNC: 166 MG/DL (ref 0–100)
NONHDLC SERPL-MCNC: 207 MG/DL
POTASSIUM SERPL-SCNC: 3.4 MMOL/L (ref 3.5–5.3)
PROT SERPL-MCNC: 7.7 G/DL (ref 6.4–8.4)
SODIUM SERPL-SCNC: 140 MMOL/L (ref 135–147)
TRIGL SERPL-MCNC: 203 MG/DL

## 2023-08-21 PROCEDURE — 80061 LIPID PANEL: CPT

## 2023-08-21 PROCEDURE — 36415 COLL VENOUS BLD VENIPUNCTURE: CPT

## 2023-08-21 PROCEDURE — 80053 COMPREHEN METABOLIC PANEL: CPT

## 2023-08-23 DIAGNOSIS — R73.01 IFG (IMPAIRED FASTING GLUCOSE): ICD-10-CM

## 2023-08-23 DIAGNOSIS — E78.5 DYSLIPIDEMIA: ICD-10-CM

## 2023-08-23 DIAGNOSIS — I10 HYPERTENSION, ESSENTIAL, BENIGN: Primary | ICD-10-CM

## 2023-08-23 RX ORDER — EZETIMIBE 10 MG/1
10 TABLET ORAL DAILY
Qty: 30 TABLET | Refills: 5 | Status: SHIPPED | OUTPATIENT
Start: 2023-08-23 | End: 2023-09-23

## 2023-08-24 ENCOUNTER — HOSPITAL ENCOUNTER (OUTPATIENT)
Dept: RADIOLOGY | Facility: HOSPITAL | Age: 53
Discharge: HOME | End: 2023-08-24
Attending: EMERGENCY MEDICINE
Payer: COMMERCIAL

## 2023-08-24 DIAGNOSIS — I65.01 ASYMPTOMATIC STENOSIS OF RIGHT VERTEBRAL ARTERY: ICD-10-CM

## 2023-08-24 PROCEDURE — 70496 CT ANGIOGRAPHY HEAD: CPT

## 2023-08-24 PROCEDURE — G1004 CDSM NDSC: HCPCS

## 2023-08-24 PROCEDURE — 70498 CT ANGIOGRAPHY NECK: CPT

## 2023-08-24 RX ADMIN — IOHEXOL 85 ML: 350 INJECTION, SOLUTION INTRAVENOUS at 07:29

## 2023-08-28 ENCOUNTER — TELEPHONE (OUTPATIENT)
Dept: EMERGENCY DEPT | Facility: HOSPITAL | Age: 53
End: 2023-08-28

## 2023-09-21 ENCOUNTER — OFFICE VISIT (OUTPATIENT)
Dept: URGENT CARE | Age: 53
End: 2023-09-21
Payer: COMMERCIAL

## 2023-09-21 VITALS
DIASTOLIC BLOOD PRESSURE: 100 MMHG | OXYGEN SATURATION: 99 % | SYSTOLIC BLOOD PRESSURE: 180 MMHG | RESPIRATION RATE: 16 BRPM | TEMPERATURE: 98 F | HEART RATE: 77 BPM

## 2023-09-21 DIAGNOSIS — R68.89 FLU-LIKE SYMPTOMS: Primary | ICD-10-CM

## 2023-09-21 LAB
SARS-COV-2 AG UPPER RESP QL IA: NEGATIVE
VALID CONTROL: NORMAL

## 2023-09-21 PROCEDURE — 87811 SARS-COV-2 COVID19 W/OPTIC: CPT | Performed by: NURSE PRACTITIONER

## 2023-09-21 PROCEDURE — 99213 OFFICE O/P EST LOW 20 MIN: CPT | Performed by: NURSE PRACTITIONER

## 2023-09-21 NOTE — LETTER
September 21, 2023     Patient: Carolee Porter   YOB: 1970   Date of Visit: 9/21/2023       To Whom it May Concern:    Jason Prosper was seen in my clinic on 9/21/2023. She may return to work on 09/23/2023. She reports she was out of work yesterday bc of same medical condition    If you have any questions or concerns, please don't hesitate to call.          Sincerely,          HARLEY Barbour        CC: No Recipients

## 2023-09-21 NOTE — PROGRESS NOTES
[facility]        NAME: Parag Cruz is a 46 y.o. female  : 1970    MRN: [id number]  DATE: 2023  TIME: 2:08 PM    Assessment and Plan   Flu-like symptoms [R68.89]  1. Flu-like symptoms  Poct Covid 19 Rapid Antigen Test            Patient Instructions     COVID is negative  OTC medications for cold and congestion  Tylenol for body aches and pain  Follow up with PCP in 3-5 days. Proceed to  ER if symptoms worsen. Chief Complaint     Chief Complaint   Patient presents with   • Cold Like Symptoms   • Cough     Patient reports body aches, cough, congestion, chills. Afebrile. Symptoms started Monday, continuing to get worse. History of Present Illness       HPI   Presents to clinic with complaint of cold symptoms for body aches, cough, congestion. Ongoing for 4 days. Did not do a home COVID test.  Took some NyQuil. Review of Systems   Review of Systems   Constitutional: Positive for chills. Negative for fever. HENT: Positive for postnasal drip and rhinorrhea. Negative for ear pain and sore throat. Respiratory: Positive for cough. Negative for shortness of breath and wheezing. Cardiovascular: Negative for chest pain. Gastrointestinal: Negative for nausea and vomiting. Musculoskeletal: Positive for myalgias. Skin: Negative for rash. Neurological: Positive for headaches.          Current Medications       Current Outpatient Medications:   •  aspirin (ECOTRIN LOW STRENGTH) 81 mg EC tablet, Take 81 mg by mouth daily, Disp: , Rfl:   •  atorvastatin (LIPITOR) 40 mg tablet, Take 1 tablet (40 mg total) by mouth daily with dinner, Disp: 90 tablet, Rfl: 1  •  lisinopril (ZESTRIL) 20 mg tablet, Take 1 tablet (20 mg total) by mouth daily, Disp: 90 tablet, Rfl: 1  •  omega-3-acid ethyl esters (LOVAZA) 1 g capsule, Take 2 capsules (2 g total) by mouth 2 (two) times a day, Disp: 360 capsule, Rfl: 1  •  potassium chloride (MICRO-K) 10 MEQ CR capsule, TAKE 1 CAPSULE BY MOUTH EVERY DAY, Disp: 90 capsule, Rfl: 2  •  albuterol (Ventolin HFA) 90 mcg/act inhaler, Inhale 2 puffs every 6 (six) hours as needed for wheezing (Patient not taking: Reported on 2023), Disp: 18 g, Rfl: 5  •  amLODIPine (NORVASC) 5 mg tablet, Take 1 tablet (5 mg total) by mouth daily, Disp: 90 tablet, Rfl: 1  •  ezetimibe (ZETIA) 10 mg tablet, Take 1 tablet (10 mg total) by mouth daily (Patient not taking: Reported on 2023), Disp: 30 tablet, Rfl: 5    Current Allergies     Allergies as of 2023   • (No Known Allergies)            The following portions of the patient's history were reviewed and updated as appropriate: allergies, current medications, past family history, past medical history, past social history, past surgical history and problem list.     Past Medical History:   Diagnosis Date   • Cardiac disorder    • Congenital heart defect     last assessed 3/3/15, resolved 3/3/15   • Endometrial polyp        Past Surgical History:   Procedure Laterality Date   • CARDIAC SURGERY      valve repair    •  SECTION      x 2   • ENDOMETRIAL BIOPSY      by suction    • FOOT SURGERY Left    • INSERTION OF INTRAUTERINE DEVICE (IUD)     • REMOVAL OF INTRAUTERINE DEVICE (IUD)         Family History   Problem Relation Age of Onset   • Heart disease Mother    • No Known Problems Father    • Heart disease Maternal Grandmother    • Coronary artery disease Family    • No Known Problems Sister    • No Known Problems Maternal Grandfather    • No Known Problems Paternal Grandmother    • No Known Problems Paternal Grandfather    • No Known Problems Sister    • No Known Problems Maternal Aunt    • No Known Problems Maternal Aunt    • No Known Problems Maternal Aunt    • No Known Problems Maternal Aunt    • No Known Problems Paternal Aunt    • No Known Problems Paternal Aunt    • No Known Problems Paternal Aunt    • No Known Problems Paternal Aunt          Medications have been verified.         Objective   BP (!) 180/100   Pulse 77   Temp 98 °F (36.7 °C)   Resp 16   LMP 02/01/2019 (Approximate)   SpO2 99%   Patient's last menstrual period was 02/01/2019 (approximate). Physical Exam     Physical Exam  Constitutional:       Appearance: She is ill-appearing. HENT:      Right Ear: Tympanic membrane normal.      Left Ear: Tympanic membrane normal.      Nose: Rhinorrhea present. Mouth/Throat:      Mouth: Mucous membranes are moist.      Pharynx: No posterior oropharyngeal erythema. Cardiovascular:      Rate and Rhythm: Regular rhythm. Heart sounds: Normal heart sounds. Pulmonary:      Effort: Pulmonary effort is normal.      Breath sounds: Wheezing (left lower lobe) present.

## 2023-09-23 DIAGNOSIS — I10 HYPERTENSION, ESSENTIAL, BENIGN: ICD-10-CM

## 2023-09-23 RX ORDER — EZETIMIBE 10 MG/1
10 TABLET ORAL DAILY
Qty: 90 TABLET | Refills: 2 | Status: SHIPPED | OUTPATIENT
Start: 2023-09-23

## 2023-10-02 ENCOUNTER — OFFICE VISIT (OUTPATIENT)
Dept: NEUROSURGERY | Facility: CLINIC | Age: 53
End: 2023-10-02
Payer: COMMERCIAL

## 2023-10-02 VITALS
HEIGHT: 61 IN | BODY MASS INDEX: 32.66 KG/M2 | TEMPERATURE: 98.4 F | OXYGEN SATURATION: 98 % | WEIGHT: 173 LBS | SYSTOLIC BLOOD PRESSURE: 146 MMHG | HEART RATE: 87 BPM | DIASTOLIC BLOOD PRESSURE: 88 MMHG | RESPIRATION RATE: 16 BRPM

## 2023-10-02 DIAGNOSIS — I67.1 CEREBRAL ANEURYSM, NONRUPTURED: Primary | ICD-10-CM

## 2023-10-02 DIAGNOSIS — I65.01 ASYMPTOMATIC STENOSIS OF RIGHT VERTEBRAL ARTERY: ICD-10-CM

## 2023-10-02 DIAGNOSIS — R51.9 ACUTE NONINTRACTABLE HEADACHE, UNSPECIFIED HEADACHE TYPE: ICD-10-CM

## 2023-10-02 PROCEDURE — 99203 OFFICE O/P NEW LOW 30 MIN: CPT | Performed by: NURSE PRACTITIONER

## 2023-10-02 NOTE — PROGRESS NOTES
Neurosurgery Office Note  Eveline Edmondson 46 y.o. female MRN: [id number]      Assessment/Plan     Cerebral aneurysm, nonruptured  Presents as referral from ED for evaluation of incidental right ICA aneurysm vs infundibulum  · Evaluated in ED on 5/22/2023 for sudden onset HA, neck pain, nausea and vision changes. · Noted with above. · No family history of aneurysm or sudden death. · History of hypertension controlled with lisinopril, and amlodipine. Checks pressure daily. · Non-smoker. · Exam is non-focal.     Imaging:  · CTA Head and neck, 8/24/2023: No acute intracranial abnormality. No vessel wall irregularity identified in right vertebral artery V3 segment in right C2 transverse foramen. Unchanged mildly hypoplastic right vertebral artery which becomes more diminutive after early PICA takeoff in V4 segment. Unchanged tiny focal outpouching along the medial aspect of right ICA cavernous segment, may represent tiny aneurysm. Recommend [facility] with [facility] at [phone]. Plan:  · Reviewed imaging extensively with patient. · Finding likely represents infundibulum rather than aneurysm. · Discussed natural history and definition of infundibulum. · Reviewed that finding unrelated to previous symptoms and is of no consequence to her future health. · Follow up as needed.        Diagnoses and all orders for this visit:    Cerebral aneurysm, nonruptured    Acute nonintractable headache, unspecified headache type  -     Ambulatory Referral to Neurosurgery    Asymptomatic stenosis of right vertebral artery  -     Ambulatory Referral to Neurosurgery          I have spent a total time of 30 minutes on 10/02/23 in caring for this patient including Diagnostic results, Instructions for management, Patient and family education, Importance of tx compliance, Risk factor reductions, Impressions, Counseling / Coordination of care, Documenting in the medical record, Reviewing / ordering tests, medicine, procedures   and Obtaining or reviewing history  . CHIEF COMPLAINT    Chief Complaint   Patient presents with   • Consult       HISTORY    History of Present Illness     46y.o. year old female     With past medical history of hypertension, hyperlipidemia, history of open heart surgery in 2001 on aspirin, who presents for evaluation of incidentally noted right cavernous ICA aneurysm versus infundibulum. He was evaluated in the [address] [place] [address] emergency department on 5/23/2023 for severe headache, neck pain, nausea and vision changes that occurred while she was at work. These eventually resolved and she has not experienced any further such episodes. She stated the headache was unlike any she had had before and was severe in nature. She had CTA completed in the emergency department that indicated finding as mentioned above. She states she has a history of hypertension and checks her blood pressure every day. Systolic blood pressure is usually in the 130s. She denies any family history of aneurysm or sudden death at a young age. She has no smoking history. She works in laundry. She lives at home with her . She has 2 children who live outside the home ages 34 and 29. She has 5 grandchildren. See Discussion    REVIEW OF SYSTEMS    Review of Systems   Constitutional: Negative. Eyes: Negative. Respiratory: Negative. Cardiovascular: Negative. Hx of open heart surgery 2001   Gastrointestinal: Negative. Endocrine: Negative. Genitourinary: Negative. Musculoskeletal: Positive for neck pain (discomfort on left side of neck down into left arm at times). Allergic/Immunologic: Negative. Hematological:        Asa 81, omega 3   Psychiatric/Behavioral: Negative. ROS obtained by MA. Reviewed. See HPI.      Meds/Allergies     Current Outpatient Medications   Medication Sig Dispense Refill   • amLODIPine (NORVASC) 5 mg tablet Take 1 tablet (5 mg total) by mouth daily 90 tablet 1   • aspirin (ECOTRIN LOW STRENGTH) 81 mg EC tablet Take 81 mg by mouth daily     • atorvastatin (LIPITOR) 40 mg tablet Take 1 tablet (40 mg total) by mouth daily with dinner 90 tablet 1   • ezetimibe (ZETIA) 10 mg tablet TAKE 1 TABLET BY MOUTH EVERY DAY 90 tablet 2   • lisinopril (ZESTRIL) 20 mg tablet Take 1 tablet (20 mg total) by mouth daily 90 tablet 1   • omega-3-acid ethyl esters (LOVAZA) 1 g capsule Take 2 capsules (2 g total) by mouth 2 (two) times a day 360 capsule 1   • potassium chloride (MICRO-K) 10 MEQ CR capsule TAKE 1 CAPSULE BY MOUTH EVERY DAY 90 capsule 2     No current facility-administered medications for this visit.        No Known Allergies    PAST HISTORY    Past Medical History:   Diagnosis Date   • Cardiac disorder    • Congenital heart defect     last assessed 3/3/15, resolved 3/3/15   • Endometrial polyp        Past Surgical History:   Procedure Laterality Date   • CARDIAC SURGERY      valve repair    •  SECTION      x 2   • ENDOMETRIAL BIOPSY      by suction    • FOOT SURGERY Left    • INSERTION OF INTRAUTERINE DEVICE (IUD)     • REMOVAL OF INTRAUTERINE DEVICE (IUD)         Social History     Tobacco Use   • Smoking status: Never   • Smokeless tobacco: Never   Vaping Use   • Vaping Use: Never used   Substance Use Topics   • Alcohol use: No   • Drug use: No       Family History   Problem Relation Age of Onset   • Heart disease Mother    • No Known Problems Father    • Heart disease Maternal Grandmother    • Coronary artery disease Family    • No Known Problems Sister    • No Known Problems Maternal Grandfather    • No Known Problems Paternal Grandmother    • No Known Problems Paternal Grandfather    • No Known Problems Sister    • No Known Problems Maternal Aunt    • No Known Problems Maternal Aunt    • No Known Problems Maternal Aunt    • No Known Problems Maternal Aunt    • No Known Problems Paternal Aunt • No Known Problems Paternal Aunt    • No Known Problems Paternal Aunt    • No Known Problems Paternal Aunt          Above history personally reviewed. EXAM    Vitals:Blood pressure 146/88, pulse 87, temperature 98.4 °F (36.9 °C), temperature source Temporal, resp. rate 16, height 5' 1" (1.549 m), weight 78.5 kg (173 lb), last menstrual period 02/01/2019, SpO2 98 %, not currently breastfeeding. ,Body mass index is 32.69 kg/m². Physical Exam  Constitutional:       Appearance: She is well-developed. HENT:      Head: Normocephalic and atraumatic. Eyes:      Extraocular Movements: EOM normal.      Pupils: Pupils are equal, round, and reactive to light. Pulmonary:      Effort: Pulmonary effort is normal.   Abdominal:      Palpations: Abdomen is soft. Musculoskeletal:         General: Normal range of motion. Cervical back: Normal range of motion and neck supple. Skin:     General: Skin is warm and dry. Neurological:      General: No focal deficit present. Mental Status: She is alert and oriented to person, place, and time. Mental status is at baseline. Cranial Nerves: Cranial nerves 2-12 are intact. No cranial nerve deficit. Sensory: No sensory deficit. Motor: No weakness. Coordination: Coordination normal. Finger-Nose-Finger Test normal.   Psychiatric:         Speech: Speech normal.         Neurologic Exam     Mental Status   Oriented to person, place, and time. Oriented to person. Oriented to place. Oriented to time. Oriented to year, month and date. Attention: normal. Concentration: normal.   Speech: speech is normal   Level of consciousness: alert  Knowledge: good. Cranial Nerves   Cranial nerves II through XII intact. CN III, IV, VI   Pupils are equal, round, and reactive to light. Extraocular motions are normal.   Right pupil: Size: 3 mm. Shape: regular. Reactivity: brisk. Consensual response: intact. Accommodation: intact.    Left pupil: Size: 3 mm. Shape: regular. Reactivity: brisk. Consensual response: intact. Accommodation: intact. Nystagmus: none   Diplopia: none  Conjugate gaze: present    CN V   Right facial sensation deficit: none  Left facial sensation deficit: none    CN VII   Facial expression full, symmetric. CN VIII   Hearing: intact    CN IX, X   Palate: symmetric    CN XI   Right sternocleidomastoid strength: normal  Left sternocleidomastoid strength: normal  Right trapezius strength: normal  Left trapezius strength: normal    CN XII   Tongue: not atrophic  Fasciculations: absent  Tongue deviation: none    Motor Exam   Muscle bulk: normal  Overall muscle tone: normal  Right arm pronator drift: absent  Left arm pronator drift: absent    Strength   Right deltoid: 5/5  Left deltoid: 5/5  Right biceps: 5/5  Left biceps: 5/5  Right triceps: 5/5  Left triceps: 5/5  Right quadriceps: 5/5  Left quadriceps: 5/5  Right hamstrin/5  Left hamstrin/5  Right anterior tibial: 5/5  Left anterior tibial: 5/5  Right posterior tibial: 5/5  Left posterior tibial: 5/5  Right peroneal: 5/5  Left peroneal: 5/5  Right gastroc: 5/5  Left gastroc: 5/5    Sensory Exam   Light touch normal.   Proprioception normal.     Gait, Coordination, and Reflexes     Coordination   Finger to nose coordination: normal    Tremor   Resting tremor: absent  Intention tremor: absent  Action tremor: absent        MEDICAL DECISION MAKING    Imaging Studies:     No results found. I have personally reviewed pertinent reports.    and I have personally reviewed pertinent films in PACS

## 2023-10-02 NOTE — ASSESSMENT & PLAN NOTE
Presents as referral from ED for evaluation of incidental right ICA aneurysm vs infundibulum  · Evaluated in ED on 5/22/2023 for sudden onset HA, neck pain, nausea and vision changes. · Noted with above. · No family history of aneurysm or sudden death. · History of hypertension controlled with lisinopril, and amlodipine. Checks pressure daily. · Non-smoker. · Exam is non-focal.     Imaging:  · CTA Head and neck, 8/24/2023: No acute intracranial abnormality. No vessel wall irregularity identified in right vertebral artery V3 segment in right C2 transverse foramen. Unchanged mildly hypoplastic right vertebral artery which becomes more diminutive after early PICA takeoff in V4 segment. Unchanged tiny focal outpouching along the medial aspect of right ICA cavernous segment, may represent tiny aneurysm. Recommend consultation with the [facility], a division of [facility] at [phone]. Plan:  · Reviewed imaging extensively with patient. · Finding likely represents infundibulum rather than aneurysm. · Discussed natural history and definition of infundibulum. · Reviewed that finding unrelated to previous symptoms and is of no consequence to her future health. · Follow up as needed.

## 2023-10-02 NOTE — LETTER
October 2, 2023     Patient: Kalyani Phelps   YOB: 1970   Date of Visit: 10/2/2023       To Whom It May Concern:    Joey Yee was seen in my clinic on 10/2/2023 at 9:15 am. Please excuse Sharon Hansen for her absence from work on this day to make the appointment. If you have any questions or concerns, please don't hesitate to call.          Sincerely,         Arnold Bravo MD        CC: Kalyani Phelps

## 2023-10-08 ENCOUNTER — HOSPITAL ENCOUNTER (EMERGENCY)
Facility: HOSPITAL | Age: 53
Discharge: HOME | End: 2023-10-09
Attending: EMERGENCY MEDICINE
Payer: COMMERCIAL

## 2023-10-08 DIAGNOSIS — I10 HYPERTENSION, ESSENTIAL, BENIGN: Primary | ICD-10-CM

## 2023-10-08 DIAGNOSIS — I10 HYPERTENSION: Primary | ICD-10-CM

## 2023-10-08 DIAGNOSIS — R07.9 CHEST PAIN: ICD-10-CM

## 2023-10-08 DIAGNOSIS — R51.9 HEADACHE: ICD-10-CM

## 2023-10-08 PROCEDURE — 93005 ELECTROCARDIOGRAM TRACING: CPT

## 2023-10-08 PROCEDURE — 99284 EMERGENCY DEPT VISIT MOD MDM: CPT | Performed by: EMERGENCY MEDICINE

## 2023-10-08 PROCEDURE — 96375 TX/PRO/DX INJ NEW DRUG ADDON: CPT

## 2023-10-08 PROCEDURE — 96365 THER/PROPH/DIAG IV INF INIT: CPT

## 2023-10-08 PROCEDURE — 96366 THER/PROPH/DIAG IV INF ADDON: CPT

## 2023-10-08 PROCEDURE — 99283 EMERGENCY DEPT VISIT LOW MDM: CPT

## 2023-10-08 RX ORDER — METOCLOPRAMIDE HYDROCHLORIDE 5 MG/ML
10 INJECTION INTRAMUSCULAR; INTRAVENOUS ONCE
Status: COMPLETED | OUTPATIENT
Start: 2023-10-08 | End: 2023-10-08

## 2023-10-08 RX ORDER — KETOROLAC TROMETHAMINE 30 MG/ML
15 INJECTION, SOLUTION INTRAMUSCULAR; INTRAVENOUS ONCE
Status: COMPLETED | OUTPATIENT
Start: 2023-10-08 | End: 2023-10-08

## 2023-10-08 RX ORDER — MAGNESIUM SULFATE HEPTAHYDRATE 40 MG/ML
2 INJECTION, SOLUTION INTRAVENOUS ONCE
Status: COMPLETED | OUTPATIENT
Start: 2023-10-08 | End: 2023-10-09

## 2023-10-08 RX ORDER — AMLODIPINE BESYLATE 5 MG/1
5 TABLET ORAL ONCE
Status: COMPLETED | OUTPATIENT
Start: 2023-10-08 | End: 2023-10-09

## 2023-10-08 RX ORDER — DIPHENHYDRAMINE HYDROCHLORIDE 50 MG/ML
25 INJECTION INTRAMUSCULAR; INTRAVENOUS ONCE
Status: COMPLETED | OUTPATIENT
Start: 2023-10-08 | End: 2023-10-08

## 2023-10-08 RX ADMIN — MAGNESIUM SULFATE HEPTAHYDRATE 2 G: 40 INJECTION, SOLUTION INTRAVENOUS at 22:14

## 2023-10-08 RX ADMIN — METOCLOPRAMIDE 10 MG: 5 INJECTION, SOLUTION INTRAMUSCULAR; INTRAVENOUS at 22:07

## 2023-10-08 RX ADMIN — DIPHENHYDRAMINE HYDROCHLORIDE 25 MG: 50 INJECTION, SOLUTION INTRAMUSCULAR; INTRAVENOUS at 22:07

## 2023-10-08 RX ADMIN — SODIUM CHLORIDE 1000 ML: 0.9 INJECTION, SOLUTION INTRAVENOUS at 22:07

## 2023-10-08 RX ADMIN — KETOROLAC TROMETHAMINE 15 MG: 30 INJECTION, SOLUTION INTRAMUSCULAR; INTRAVENOUS at 22:07

## 2023-10-09 VITALS
OXYGEN SATURATION: 98 % | RESPIRATION RATE: 17 BRPM | DIASTOLIC BLOOD PRESSURE: 83 MMHG | SYSTOLIC BLOOD PRESSURE: 175 MMHG | TEMPERATURE: 97.9 F | HEART RATE: 63 BPM

## 2023-10-09 LAB
ATRIAL RATE: 57 BPM
P AXIS: 19 DEGREES
PR INTERVAL: 176 MS
QRS AXIS: 39 DEGREES
QRSD INTERVAL: 124 MS
QT INTERVAL: 464 MS
QTC INTERVAL: 451 MS
T WAVE AXIS: -29 DEGREES
VENTRICULAR RATE: 57 BPM

## 2023-10-09 PROCEDURE — 93010 ELECTROCARDIOGRAM REPORT: CPT | Performed by: INTERNAL MEDICINE

## 2023-10-09 RX ORDER — AMLODIPINE BESYLATE 5 MG/1
5 TABLET ORAL DAILY
Qty: 30 TABLET | Refills: 0 | Status: SHIPPED | OUTPATIENT
Start: 2023-10-09

## 2023-10-09 RX ADMIN — AMLODIPINE BESYLATE 5 MG: 5 TABLET ORAL at 00:03

## 2023-10-09 NOTE — ED ATTENDING ATTESTATION
Final Diagnoses:     1. Hypertension    2. Headache           Binta MALONEY MD, saw and evaluated the patient. All available labs and X-rays were ordered by me or the resident / non-physician and have been reviewed by myself. I discussed the patient with the resident / non-physician and agree with the resident's / non-physician practitioner's findings and plan as documented in the resident's / non-physician practicitioner's note, except where noted. At this point, I agree with the current assessment done in the ED. I was present during key portions of all procedures performed unless otherwise stated. Nursing Triage:     Chief Complaint   Patient presents with    Hypertension     Pt states she is having high blood pressure, states she takes medication for hypertension and took those today, complaining of headache. No chest pain. HPI:   This is a 46 y.o. female presenting for evaluation of HTN. On lisinopril for HTN chronically. Denies headache but has a "heaviness" in her head  No f/ch/n/v/cp/sob. No falls/trauma  Hx of brain aneurysm? Incidental RIGHT ICA aneurysm. She had severe headache nausea vision changes previously for which had a CTA was done finding the aneurysm in August.   Per neurosurgery notes, they thought this was more of an infundibulum more than aneurysm and state that they explained this to the patient. The patietn is under the impression she has a large aneurysm and was told to watch her BP so she is here. The heaviness / pressure in her head is primarily bifrontal going to the occiput. Has this vague LEFT upper extremity sensation, maybe numbness. ..but it's not new, was there previously. No incontience. Denies any urinary tract infection symptoms (burning, itching, pain, blood, frequency). Denies any upper respiratory tract infection symptoms (cough, congestion, rhinorrhea, sore throat).      ASSESSMENT + PLAN:   Headache  Sounds like a primary headache syndrome but the patient is very concerned that this is due to her elevated BP or aneurysm. The headache was gradually getting worse throughout the day, that's not the typical hx for an aneurysm rupture. Doubt that  Supportive measures, re-evaluate. EKG for arm numbess  Doubt strike. Significant anxiety and discomfort ? likely causing the elevated BP. Physical:   Pertinent: C-spine: NT, supple. No midline tenderness. Kernig's Brudzinski's negative. EHL/FHL/PF/DF/KF/KE/HF/HE 5/5. No saddle anesthesia. 2+ patellar reflexes. SLR negative. M/U/R/A nerve sensation bilateral intact and equal.   strength 5/5. Finger abduction/adduction/opposition intact. Suppination/Pronation intact without reproduction of pain. Good capillary refill. 2+ radial pulses, bilaterally equal.   WE/WF/WRD/WUD 5/5, intact, without pain. BICEPS/TRICEPS 5/5. Shoulder abduction/adduction 5/5. No pronator drift. No aphasia/dysarthria. CN 2-12 intact. Normal finger to nose. Normal rapid alternating movements of hands. No nystagmus. No facial droop. NIH Stroke Scale Score = 0    General: VS reviewed  Appears in NAD  awake, alert. Well-nourished, well-developed. Appears stated age. Speaking normally in full sentences. Head: Normocephalic, atraumatic  Eyes: EOM-I. No diplopia. No hyphema. No subconjunctival hemorrhages. Symmetrical lids. ENT: Atraumatic external nose and ears. MMM  No malocclusion. No stridor. Normal phonation. No drooling. Normal swallowing. Neck: No JVD. CV: No pallor noted  Lungs:   No tachypnea  No respiratory distress  Abd: soft nt nd no rebound/guarding  MSK:   FROM spontaneously  Skin: Dry, intact. Neuro: Awake, alert, GCS15, CN II-XII grossly intact. Motor grossly intact. Psychiatric/Behavioral: interacting normally; appropriate mood/affect.     Exam: deferred    Vitals:    10/08/23 2117 10/08/23 2337 10/09/23 0029   BP: (!) 218/97 (!) 207/87 (!) 175/83   BP Location: Right arm Pulse: 63     Resp: 17     Temp: 97.9 °F (36.6 °C)     TempSrc: Oral     SpO2: 98%       - There are no obvious limitations to social determinants of care. - Nursing note reviewed. - Vitals reviewed. - Orders placed by myself and/or advanced practitioner / resident.    - Previous chart was reviewed  - No language barrier.   - History obtained from patient. - There are no limitations to the history obtained:     Past Medical:    has a past medical history of Cardiac disorder, Congenital heart defect, and Endometrial polyp. Past Surgical:    has a past surgical history that includes Cardiac surgery;  section; Endometrial biopsy; INSERTION OF INTRAUTERINE DEVICE (IUD); REMOVAL OF INTRAUTERINE DEVICE (IUD); and Foot surgery (Left). Social:     Social History     Substance and Sexual Activity   Alcohol Use No     Social History     Tobacco Use   Smoking Status Never   Smokeless Tobacco Never     Social History     Substance and Sexual Activity   Drug Use No       Echo:   No results found for this or any previous visit. No results found for this or any previous visit. Cath:    No results found for this or any previous visit.       Code Status: Prior  Advance Directive and Living Will:      Power of :    POLST:    Medications   ketorolac (TORADOL) injection 15 mg (15 mg Intravenous Given 10/8/23 2207)   metoclopramide (REGLAN) injection 10 mg (10 mg Intravenous Given 10/8/23 2207)   diphenhydrAMINE (BENADRYL) injection 25 mg (25 mg Intravenous Given 10/8/23 2207)   magnesium sulfate 2 g/50 mL IVPB (premix) 2 g (0 g Intravenous Stopped 10/9/23 0006)   sodium chloride 0.9 % bolus 1,000 mL (0 mL Intravenous Stopped 10/9/23 0007)   amLODIPine (NORVASC) tablet 5 mg (5 mg Oral Given 10/9/23 0003)     No orders to display     Orders Placed This Encounter   Procedures    ECG 12 lead    ECG 12 lead     Labs Reviewed - No data to display  Time reflects when diagnosis was documented in both MDM as applicable and the Disposition within this note       Time User Action Codes Description Comment    10/9/2023 12:30 AM Malcolm SALAZAR Add [I10] Hypertension     10/9/2023 12:30 AM Stephanie Marshall Add [R51.9] Headache           ED Disposition       ED Disposition   Discharge    Condition   Stable    Date/Time   Mon Oct 9, 2023 12:29 AM    Comment   Eveline Lips discharge to home/self care. Follow-up Information       Follow up With Specialties Details Why [address] [facility], [address] [facility], Nurse Practitioner Call in 1 day  [address] [facility]  [phone]            Discharge Medication List as of 10/9/2023 12:31 AM        CONTINUE these medications which have CHANGED    Details   amLODIPine (NORVASC) 5 mg tablet Take 1 tablet (5 mg total) by mouth daily, Starting Mon 10/9/2023, Normal           CONTINUE these medications which have NOT CHANGED    Details   aspirin (ECOTRIN LOW STRENGTH) 81 mg EC tablet Take 81 mg by mouth daily, Historical Med      atorvastatin (LIPITOR) 40 mg tablet Take 1 tablet (40 mg total) by mouth daily with dinner, Starting Wed 5/24/2023, Normal      ezetimibe (ZETIA) 10 mg tablet TAKE 1 TABLET BY MOUTH EVERY DAY, Starting Sat 9/23/2023, Normal      lisinopril (ZESTRIL) 20 mg tablet Take 1 tablet (20 mg total) by mouth daily, Starting Tue 5/23/2023, Normal      omega-3-acid ethyl esters (LOVAZA) 1 g capsule Take 2 capsules (2 g total) by mouth 2 (two) times a day, Starting Tue 5/23/2023, Normal      potassium chloride (MICRO-K) 10 MEQ CR capsule TAKE 1 CAPSULE BY MOUTH EVERY DAY, Normal           No discharge procedures on file. Prior to Admission Medications   Prescriptions Last Dose Informant Patient Reported? Taking?    amLODIPine (NORVASC) 5 mg tablet  Self No No   Sig: Take 1 tablet (5 mg total) by mouth daily   aspirin (ECOTRIN LOW STRENGTH) 81 mg EC tablet  Self Yes No   Sig: Take 81 mg by mouth daily atorvastatin (LIPITOR) 40 mg tablet  Self No No   Sig: Take 1 tablet (40 mg total) by mouth daily with dinner   ezetimibe (ZETIA) 10 mg tablet   No No   Sig: TAKE 1 TABLET BY MOUTH EVERY DAY   lisinopril (ZESTRIL) 20 mg tablet  Self No No   Sig: Take 1 tablet (20 mg total) by mouth daily   omega-3-acid ethyl esters (LOVAZA) 1 g capsule  Self No No   Sig: Take 2 capsules (2 g total) by mouth 2 (two) times a day   potassium chloride (MICRO-K) 10 MEQ CR capsule   No No   Sig: TAKE 1 CAPSULE BY MOUTH EVERY DAY      Facility-Administered Medications: None                        Portions of the record may have been created with voice recognition software. Occasional wrong word or "sound a like" substitutions may have occurred due to the inherent limitations of voice recognition software. Read the chart carefully and recognize, using context, where substitutions have occurred.     Electronically signed by:  Elvis Dewitt

## 2023-10-09 NOTE — ED PROVIDER NOTES
History  Chief Complaint   Patient presents with   • Hypertension     Pt states she is having high blood pressure, states she takes medication for hypertension and took those today, complaining of headache. No chest pain. HPI  Patient is a 46 y.o. female with history of HTN presenting to the emergency department for head pressure and elevated blood pressure. Patient states that she has been checking her blood pressure at home daily and yesterday and today her pressure has been ~562J systolic. She had no symptoms initially but today developed a gradual onset heaviness sensation in her head. Denies having any chest pain, SOB, urinary symptoms, neck stiffness, or fever. Denies recent head injury. Patient states that she just saw neurosurgery regarding a cerebral aneurysm and was worried about that given her elevated blood pressure. Prior to Admission Medications   Prescriptions Last Dose Informant Patient Reported? Taking?    amLODIPine (NORVASC) 5 mg tablet  Self No No   Sig: Take 1 tablet (5 mg total) by mouth daily   aspirin (ECOTRIN LOW STRENGTH) 81 mg EC tablet  Self Yes No   Sig: Take 81 mg by mouth daily   atorvastatin (LIPITOR) 40 mg tablet  Self No No   Sig: Take 1 tablet (40 mg total) by mouth daily with dinner   ezetimibe (ZETIA) 10 mg tablet   No No   Sig: TAKE 1 TABLET BY MOUTH EVERY DAY   lisinopril (ZESTRIL) 20 mg tablet  Self No No   Sig: Take 1 tablet (20 mg total) by mouth daily   omega-3-acid ethyl esters (LOVAZA) 1 g capsule  Self No No   Sig: Take 2 capsules (2 g total) by mouth 2 (two) times a day   potassium chloride (MICRO-K) 10 MEQ CR capsule   No No   Sig: TAKE 1 CAPSULE BY MOUTH EVERY DAY      Facility-Administered Medications: None       Past Medical History:   Diagnosis Date   • Cardiac disorder    • Congenital heart defect     last assessed 3/3/15, resolved 3/3/15   • Endometrial polyp        Past Surgical History:   Procedure Laterality Date   • CARDIAC SURGERY      valve repair •  SECTION      x 2   • ENDOMETRIAL BIOPSY      by suction    • FOOT SURGERY Left    • INSERTION OF INTRAUTERINE DEVICE (IUD)     • REMOVAL OF INTRAUTERINE DEVICE (IUD)         Family History   Problem Relation Age of Onset   • Heart disease Mother    • No Known Problems Father    • Heart disease Maternal Grandmother    • Coronary artery disease Family    • No Known Problems Sister    • No Known Problems Maternal Grandfather    • No Known Problems Paternal Grandmother    • No Known Problems Paternal Grandfather    • No Known Problems Sister    • No Known Problems Maternal Aunt    • No Known Problems Maternal Aunt    • No Known Problems Maternal Aunt    • No Known Problems Maternal Aunt    • No Known Problems Paternal Aunt    • No Known Problems Paternal Aunt    • No Known Problems Paternal Aunt    • No Known Problems Paternal Aunt      I have reviewed and agree with the history as documented. E-Cigarette/Vaping   • E-Cigarette Use Never User      E-Cigarette/Vaping Substances   • Nicotine No    • THC No    • CBD No    • Flavoring No    • Other No    • Unknown No      Social History     Tobacco Use   • Smoking status: Never   • Smokeless tobacco: Never   Vaping Use   • Vaping Use: Never used   Substance Use Topics   • Alcohol use: No   • Drug use: No        Review of Systems   Constitutional: Negative for chills and fever. HENT: Negative for congestion. Eyes: Negative for redness. Respiratory: Negative for cough and shortness of breath. Cardiovascular: Negative for chest pain and palpitations. Gastrointestinal: Negative for abdominal pain, diarrhea and vomiting. Endocrine: Negative for polyuria. Genitourinary: Negative for dysuria and hematuria. Musculoskeletal: Negative for arthralgias and back pain. Skin: Negative for rash. Neurological: Positive for headaches. All other systems reviewed and are negative.       Physical Exam  ED Triage Vitals [10/08/23 2117]   Temperature Pulse Respirations Blood Pressure SpO2   97.9 °F (36.6 °C) 63 17 (!) 218/97 98 %      Temp Source Heart Rate Source Patient Position - Orthostatic VS BP Location FiO2 (%)   Oral Monitor Sitting Right arm --      Pain Score       No Pain             Orthostatic Vital Signs  Vitals:    10/08/23 2117 10/08/23 2337 10/09/23 0029   BP: (!) 218/97 (!) 207/87 (!) 175/83   Pulse: 63     Patient Position - Orthostatic VS: Sitting         Physical Exam  Vitals and nursing note reviewed. Constitutional:       Appearance: Normal appearance. HENT:      Head: Normocephalic and atraumatic. Mouth/Throat:      Mouth: Mucous membranes are moist.   Eyes:      Conjunctiva/sclera: Conjunctivae normal.   Cardiovascular:      Rate and Rhythm: Normal rate and regular rhythm. Pulses: Normal pulses. Heart sounds: Normal heart sounds. Pulmonary:      Effort: Pulmonary effort is normal.      Breath sounds: Normal breath sounds. Abdominal:      Palpations: Abdomen is soft. Tenderness: There is no abdominal tenderness. Musculoskeletal:         General: No tenderness. Cervical back: Neck supple. Skin:     General: Skin is warm and dry. Capillary Refill: Capillary refill takes less than 2 seconds. Neurological:      General: No focal deficit present. Mental Status: She is alert and oriented to person, place, and time. Mental status is at baseline. Cranial Nerves: No cranial nerve deficit. Sensory: No sensory deficit. Motor: No weakness.       Coordination: Coordination normal.      Gait: Gait normal.   Psychiatric:         Mood and Affect: Mood normal.         ED Medications  Medications   ketorolac (TORADOL) injection 15 mg (15 mg Intravenous Given 10/8/23 2207)   metoclopramide (REGLAN) injection 10 mg (10 mg Intravenous Given 10/8/23 2207)   diphenhydrAMINE (BENADRYL) injection 25 mg (25 mg Intravenous Given 10/8/23 2207)   magnesium sulfate 2 g/50 mL IVPB (premix) 2 g (0 g Intravenous Stopped 10/9/23 0006)   sodium chloride 0.9 % bolus 1,000 mL (1,000 mL Intravenous New Bag 10/8/23 2207)   amLODIPine (NORVASC) tablet 5 mg (5 mg Oral Given 10/9/23 0003)       Diagnostic Studies  Results Reviewed     None                 No orders to display         Procedures  Procedures      ED Course  ED Course as of 10/09/23 0050   Sun Oct 08, 2023   2344 Patients pain improved. Blood pressure still elevated >200. Patient now states that she has not been taking her Amlodipine because she ran out of it. Has not had any in 3 days. Will give her a dose now and then discharge home with prescription                                       Medical Decision Making  Risk  Prescription drug management. Patient is a 46 y.o. female with PMH of HTN who presents to the ED with elevated blood pressure and head pressure. Vital signs BP initially 218/97. On exam well appearing, no focal neurological deficits. History and physical exam most consistent with HTN and headache. Will treat patients headache with migraine cocktail. View ED course above for further discussion on patient workup. On review of previous records patient had a CTA head an neck on 8/24/23 which showed "unchanged tiny focal outpouching along the medial aspect of right ICA cavernous segment, may represent tiny aneurysm". Patient had an exam by neurosurgery on 10/2/23, at that time her blood pressure was 146/88. Note also says that finding on CTA likely represents infundibulum rather than aneurysm. All labs reviewed and utilized in the medical decision making process  All radiology studies independently viewed by me and interpreted by the radiologist.  I reviewed all testing with the patient. Upon re-evaluation pain improved, blood pressure improved, stable for discharge home. I have reviewed the patient's vital signs, nursing notes, and other relevant tests/information.  I had a detailed discussion with the patient regarding the history, exam findings, and any diagnostic results. Plan to discharge home in stable condition with Amlodipine, follow up with PCP. Discussed with patient who is agreeable to plan. I discussed discharge instructions, need for follow-up, and oral return precautions for what to return for in addition to the written return precautions and discharge instructions, specifically highlighting areas of special concern. The patient verbalized understanding of the discharge instructions and warnings that would necessitate return to the Emergency Department including focal weakness, chest pain, headache. All questions the patient had were answered prior to discharge to the best of my ability. Disposition  Final diagnoses:   Hypertension   Headache     Time reflects when diagnosis was documented in both MDM as applicable and the Disposition within this note     Time User Action Codes Description Comment    10/9/2023 12:30 AM Nicanor Moe Add [I10] Hypertension     10/9/2023 12:30 AM Nicanor Moe Add [R51.9] Headache       ED Disposition     ED Disposition   Discharge    Condition   Stable    Date/Time   Mon Oct 9, 2023 12:29 AM    Comment   Elida Williamson discharge to home/self care. Follow-up Information     Follow up With Specialties Details Why [address] [facility], [address] [place] [address], Nurse Practitioner Call in 1 day  [address]  [phone]            Patient's Medications   Discharge Prescriptions    AMLODIPINE (NORVASC) 5 MG TABLET    Take 1 tablet (5 mg total) by mouth daily       Start Date: 10/9/2023 End Date: --       Order Dose: 5 mg       Quantity: 30 tablet    Refills: 0     No discharge procedures on file. PDMP Review     None           ED Provider  Attending physically available and evaluated Elida Williamson. I managed the patient along with the ED Attending.     Electronically Signed by         Nicanor Moe DO  10/09/23 0050

## 2023-10-09 NOTE — DISCHARGE INSTRUCTIONS
You were seen in the emergency department today for headache and high blood pressure. Follow up with your primary care provider. Take Tylenol / Ibuprofen as needed following the instructions on the bottle for pain. Take Amlodipine as prescribed for your blood pressure. Return to the emergency department for any new or concerning symptoms including worsening headache, chest pain, focal weakness. Thank you for choosing [facility] for your care today.

## 2023-10-10 ENCOUNTER — VBI (OUTPATIENT)
Dept: ADMINISTRATIVE | Facility: OTHER | Age: 53
End: 2023-10-10

## 2023-10-10 NOTE — TELEPHONE ENCOUNTER
Hernan Camacho    ED Visit Information     Ed visit date: 10/8/23  Diagnosis Description: High blood pressure/headache  In Network? Yes [address]  Discharge status: Home  Discharged with meds ? Yes  Number of ED visits to date: 2  ED Severity:3     Outreach Information    Outreach successful: No 3  Date letter mailed:10/11/23  Date Finalized:10/11/23    Care Coordination    Follow up appointment with pcp: no no  Transportation issues ?  NA    [facility] Outreach    Outreach type: 3 Day Outreach  Emergent necessity warranted by diagnosis: Yes  [facility] PCP: Yes  Transportation: Self Transport  Reason Patient went to ED instead of Urgent Care or PCP?: Perceived Severity of Illness  0/10/2023 10:40 AM EDT by Vincewesley Katz (Self) [phone] (Mobile)   Left Message - 1st  10/10/2023 04:14 PM EDT by Vince Katz (Self) [phone] (Mobile)   Left Message - 2nd  10/11/2023 10:59 AM EDT by Vince Katz (Self) [phone] (Mobile) Remove  Left Message - my chart ltr sent

## 2023-10-10 NOTE — LETTER
[facility]  [place] [id number]    Date: 10/11/23  [address] [facility] [address]    Dear Doyle Pinto:                                                                                                                              Thank you for choosing [facility] emergency department for care. Your primary care provider wants to make sure that your ongoing medical care is being addressed. If you require follow up care as a result of your emergency department visit, there are a few things the practice would like you to know. As part of the network's continuing commitment to caring for our patients, we have added more same day appointments and have extended office hours to meet your medical needs. After hours, on-call physicians are available via your primary care provider's main office line. We encourage you to contact our office prior to seeking treatment to discuss your symptoms with the medical staff. Together, we can determine the correct course of action. A majority of non-emergent conditions such as: common cold, flu-like symptoms, fevers, strains/sprains, dislocations, minor burns, cuts and animal bites can be treated at [facility] facilities. Diagnostic testing is available at some sites. Of course, if you are experiencing a life threatening medical emergency call 911 or proceed directly to the nearest emergency room. Your nearest [facility] facility is conveniently located at:    [place] [address] [facility] [address] [place] [phone]  [address] [address]  offered at most [facility] your spot online at [email or web address] or on the [address] [place] [address] Drive    Sincerely,    [facility]  No information on file.

## 2023-10-13 DIAGNOSIS — R73.01 IFG (IMPAIRED FASTING GLUCOSE): ICD-10-CM

## 2023-10-13 DIAGNOSIS — E78.5 DYSLIPIDEMIA: ICD-10-CM

## 2023-10-13 DIAGNOSIS — R07.9 CHEST PAIN: ICD-10-CM

## 2023-10-13 DIAGNOSIS — I10 HYPERTENSION, ESSENTIAL, BENIGN: Primary | ICD-10-CM

## 2023-10-13 RX ORDER — AMLODIPINE BESYLATE 5 MG/1
5 TABLET ORAL DAILY
Qty: 90 TABLET | Refills: 1 | Status: SHIPPED | OUTPATIENT
Start: 2023-10-13 | End: 2023-11-12

## 2023-10-13 RX ORDER — ASPIRIN 81 MG/1
81 TABLET ORAL DAILY
Qty: 90 TABLET | Refills: 1 | Status: SHIPPED | OUTPATIENT
Start: 2023-10-13 | End: 2024-01-11

## 2023-10-31 DIAGNOSIS — E87.6 HYPOKALEMIA: ICD-10-CM

## 2023-11-01 RX ORDER — POTASSIUM CHLORIDE 750 MG/1
10 CAPSULE, EXTENDED RELEASE ORAL DAILY
Qty: 90 CAPSULE | Refills: 0 | Status: SHIPPED | OUTPATIENT
Start: 2023-11-01

## 2023-12-28 ENCOUNTER — OFFICE VISIT (OUTPATIENT)
Dept: URGENT CARE | Age: 53
End: 2023-12-28
Payer: COMMERCIAL

## 2023-12-28 VITALS
RESPIRATION RATE: 18 BRPM | BODY MASS INDEX: 32.42 KG/M2 | DIASTOLIC BLOOD PRESSURE: 126 MMHG | HEART RATE: 86 BPM | WEIGHT: 171.6 LBS | TEMPERATURE: 99 F | SYSTOLIC BLOOD PRESSURE: 182 MMHG | OXYGEN SATURATION: 96 %

## 2023-12-28 DIAGNOSIS — E78.5 DYSLIPIDEMIA: ICD-10-CM

## 2023-12-28 DIAGNOSIS — J20.9 ACUTE BRONCHITIS, UNSPECIFIED ORGANISM: Primary | ICD-10-CM

## 2023-12-28 DIAGNOSIS — E87.6 HYPOKALEMIA: ICD-10-CM

## 2023-12-28 DIAGNOSIS — J02.9 ACUTE PHARYNGITIS, UNSPECIFIED ETIOLOGY: ICD-10-CM

## 2023-12-28 DIAGNOSIS — I10 HYPERTENSION, ESSENTIAL, BENIGN: ICD-10-CM

## 2023-12-28 LAB — S PYO AG THROAT QL: NEGATIVE

## 2023-12-28 PROCEDURE — 87880 STREP A ASSAY W/OPTIC: CPT | Performed by: PHYSICIAN ASSISTANT

## 2023-12-28 PROCEDURE — 99213 OFFICE O/P EST LOW 20 MIN: CPT | Performed by: PHYSICIAN ASSISTANT

## 2023-12-28 RX ORDER — AZITHROMYCIN 250 MG/1
TABLET, FILM COATED ORAL
Qty: 6 TABLET | Refills: 0 | Status: SHIPPED | OUTPATIENT
Start: 2023-12-28 | End: 2024-01-01

## 2023-12-28 NOTE — PATIENT INSTRUCTIONS
Rx azithromycin, take 2 tablets today, followed by 1 tablet daily for days 2-5.  Stay well hydrated and get rest.  Supportive care.  Follow up with PCP in 3-5 days.  Proceed to  ER if symptoms worsen.

## 2023-12-28 NOTE — LETTER
December 28, 2023     Patient: Kathie Zazueta   YOB: 1970   Date of Visit: 12/28/2023       To Whom it May Concern:    Kathie Zazueta was seen in my clinic on 12/28/2023. Please excuse from work for the following dates: 12/27 - 12/28/23. She may return to work on 12/29/23, as symptoms allow .    If you have any questions or concerns, please don't hesitate to call.         Sincerely,          Jonathan Leger PA-C        CC: No Recipients

## 2023-12-28 NOTE — PROGRESS NOTES
[facility]        NAME: Kathie Zazueta is a 53 y.o. female  : 1970    MRN: [id number]  DATE: 2023  TIME: 9:40 AM    Assessment and Plan   Acute bronchitis, unspecified organism [J20.9]  1. Acute bronchitis, unspecified organism  azithromycin (ZITHROMAX) 250 mg tablet      2. Acute pharyngitis, unspecified etiology  POCT rapid strepA      3. Hypertension, essential, benign              Patient Instructions     Rx azithromycin, take 2 tablets today, followed by 1 tablet daily for days 2-5.  Stay well hydrated and get rest.  Supportive care.  Continue BP meds as directed and follow up with Cardiology.  Follow up with PCP in 3-5 days.  Proceed to  ER if symptoms worsen.    Chief Complaint     Chief Complaint   Patient presents with    Cough     Loose cough, sore throat, body aches X 5 days         History of Present Illness       Patient is a 54 yo female with long standing hx of HTN who presents with acute onset of cough 5-6 days ago with mild wheezing and noisy breath sounds, per patient. Denies fever. Denies sick contacts. She's missed work the past two days, as well and would like a work note.        Review of Systems   Review of Systems   Constitutional: Negative.  Negative for fever.   HENT: Negative.     Respiratory:  Positive for cough and wheezing. Negative for chest tightness and shortness of breath.    Skin: Negative.          Current Medications       Current Outpatient Medications:     amLODIPine (NORVASC) 5 mg tablet, Take 1 tablet (5 mg total) by mouth daily, Disp: 30 tablet, Rfl: 0    aspirin (ECOTRIN LOW STRENGTH) 81 mg EC tablet, Take 1 tablet (81 mg total) by mouth daily, Disp: 90 tablet, Rfl: 1    atorvastatin (LIPITOR) 40 mg tablet, Take 1 tablet (40 mg total) by mouth daily with dinner, Disp: 90 tablet, Rfl: 1    azithromycin (ZITHROMAX) 250 mg tablet, Take 2 tablets today then 1 tablet daily x 4 days, Disp: 6 tablet, Rfl: 0    ezetimibe (ZETIA) 10 mg tablet, TAKE 1  TABLET BY MOUTH EVERY DAY, Disp: 90 tablet, Rfl: 2    lisinopril (ZESTRIL) 20 mg tablet, Take 1 tablet (20 mg total) by mouth daily, Disp: 90 tablet, Rfl: 1    omega-3-acid ethyl esters (LOVAZA) 1 g capsule, Take 2 capsules (2 g total) by mouth 2 (two) times a day, Disp: 360 capsule, Rfl: 1    potassium chloride (MICRO-K) 10 MEQ CR capsule, Take 1 capsule (10 mEq total) by mouth daily, Disp: 90 capsule, Rfl: 0    amLODIPine (NORVASC) 5 mg tablet, Take 1 tablet (5 mg total) by mouth daily, Disp: 90 tablet, Rfl: 1    Current Allergies     Allergies as of 2023    (No Known Allergies)            The following portions of the patient's history were reviewed and updated as appropriate: allergies, current medications, past family history, past medical history, past social history, past surgical history and problem list.     Past Medical History:   Diagnosis Date    Cardiac disorder     Congenital heart defect     last assessed 3/3/15, resolved 3/3/15    Endometrial polyp        Past Surgical History:   Procedure Laterality Date    CARDIAC SURGERY      valve repair      SECTION      x 2    ENDOMETRIAL BIOPSY      by suction     FOOT SURGERY Left     INSERTION OF INTRAUTERINE DEVICE (IUD)      REMOVAL OF INTRAUTERINE DEVICE (IUD)         Family History   Problem Relation Age of Onset    Heart disease Mother     No Known Problems Father     Heart disease Maternal Grandmother     Coronary artery disease Family     No Known Problems Sister     No Known Problems Maternal Grandfather     No Known Problems Paternal Grandmother     No Known Problems Paternal Grandfather     No Known Problems Sister     No Known Problems Maternal Aunt     No Known Problems Maternal Aunt     No Known Problems Maternal Aunt     No Known Problems Maternal Aunt     No Known Problems Paternal Aunt     No Known Problems Paternal Aunt     No Known Problems Paternal Aunt     No Known Problems Paternal Aunt          Medications have been  verified.        Objective   BP (!) 182/126 (BP Location: Right arm, Patient Position: Sitting, Cuff Size: Adult)   Pulse 86   Temp 99 °F (37.2 °C) (Tympanic)   Resp 18   Wt 77.8 kg (171 lb 9.6 oz)   LMP 02/01/2019 (Approximate)   SpO2 96%   BMI 32.42 kg/m²        Physical Exam     Physical Exam  HENT:      Right Ear: Tympanic membrane, ear canal and external ear normal.      Left Ear: Tympanic membrane, ear canal and external ear normal.      Nose: Nose normal.      Mouth/Throat:      Mouth: Mucous membranes are moist.      Pharynx: Oropharynx is clear.   Cardiovascular:      Rate and Rhythm: Normal rate and regular rhythm.      Pulses: Normal pulses.      Heart sounds: Normal heart sounds. No murmur heard.  Pulmonary:      Effort: Pulmonary effort is normal. No respiratory distress.      Breath sounds: Wheezing and rhonchi present.      Comments: +cough  Musculoskeletal:      Cervical back: Normal range of motion and neck supple.   Lymphadenopathy:      Cervical: No cervical adenopathy.   Skin:     General: Skin is warm and dry.      Capillary Refill: Capillary refill takes less than 2 seconds.

## 2023-12-29 RX ORDER — OMEGA-3-ACID ETHYL ESTERS 1 G/1
2 CAPSULE, LIQUID FILLED ORAL 2 TIMES DAILY
Qty: 360 CAPSULE | Refills: 0 | Status: SHIPPED | OUTPATIENT
Start: 2023-12-29

## 2024-01-03 RX ORDER — POTASSIUM CHLORIDE 750 MG/1
10 CAPSULE, EXTENDED RELEASE ORAL DAILY
Qty: 90 CAPSULE | Refills: 0 | Status: SHIPPED | OUTPATIENT
Start: 2024-01-03

## 2024-03-15 ENCOUNTER — OFFICE VISIT (OUTPATIENT)
Dept: URGENT CARE | Age: 54
End: 2024-03-15
Payer: COMMERCIAL

## 2024-03-15 VITALS
TEMPERATURE: 97.4 F | SYSTOLIC BLOOD PRESSURE: 166 MMHG | HEIGHT: 60 IN | BODY MASS INDEX: 34.55 KG/M2 | WEIGHT: 176 LBS | DIASTOLIC BLOOD PRESSURE: 100 MMHG | OXYGEN SATURATION: 98 % | HEART RATE: 77 BPM | RESPIRATION RATE: 16 BRPM

## 2024-03-15 DIAGNOSIS — R05.9 COUGH, UNSPECIFIED TYPE: Primary | ICD-10-CM

## 2024-03-15 PROCEDURE — 99214 OFFICE O/P EST MOD 30 MIN: CPT

## 2024-03-15 NOTE — PROGRESS NOTES
[facility]        NAME: Kathie Zazueta is a 53 y.o. female  : 1970    MRN: [id number]  DATE: March 15, 2024  TIME: 8:27 AM    Assessment and Plan   Cough, unspecified type [R05.9]  1. Cough, unspecified type          Take Coricidin for cough.  Take your blood pressure medications as prescribed each day.    If symptoms do not improve or worsen, please follow with PCP for further evaluation.     You need to quarantine at home for a minimum of 5 days; the day your symptoms started is DAY 0! You may end your quarantine when you are fever free without medications to reduce fever (e.g. acetaminophen/Tylenol) for 24 hours or after 5 full days of quarantine, whichever comes later. Anyone whom you have been in close regular contact (unmasked > 15 minute intervals) since you began having symptoms should be tested within 5-6 days of your positive test regardless of vaccination status or symptoms. Masks should be worn at all times whenever indoors until 10 days after your exposure or positive result. Anyone with whom you have been in close contact should mask for 10 days, if they develop symptoms at any point then their clock for masking and quarantine goes back to 0, meaning they need to quarantine 5 days and mask 10 days.       Take Mucinex DM as needed over the counter for cough in adults. Recommend Children's mucinex for children > 4 years and Zarbee's cough and cold or Gwendolyn drops for children 2-4 years old.   Recommend over the counter lozenges for any sore throat symptoms.  Motrin and/or Tylenol as needed for fever.   Recommend Vitamin C, Vitamin D3, multivitamin and Zinc for immune support. Discuss dosing recommendations with pharmacist especially in children and infants.   If your symptoms worsen or you develop shortness of breath report to the nearest emergency room.     Patient Instructions       Follow up with PCP in 3-5 days.  Proceed to  ER if symptoms worsen.    If tests have been performed at  [facility], our office will contact you with results if changes need to be made to the care plan discussed with you at the visit.  You can [facility] full [facility] on [facility].    Chief Complaint     Chief Complaint   Patient presents with   • Cold Like Symptoms     Patient tested positive for Covid. States she is here because the cough is severe and would like medication. She has been using Delsym.         History of Present Illness         [phone])  utilized for visit.  Pt is a 53 year old female presenting with Cough for 3 days.  COVID positive at home.  Fever and body aches yesterday.  Taking Delsym with minimal relief.  Pt denies CP, SOB, or difficulty breathing.  No nausea vomiting or diarrhea.  Able to eat and drink well.        Review of Systems   Review of Systems   Constitutional:  Positive for fever.   HENT:  Positive for congestion, rhinorrhea and sore throat.    Respiratory:  Positive for cough. Negative for chest tightness, shortness of breath and wheezing.          Current Medications       Current Outpatient Medications:   •  amLODIPine (NORVASC) 5 mg tablet, Take 1 tablet (5 mg total) by mouth daily, Disp: 30 tablet, Rfl: 0  •  atorvastatin (LIPITOR) 40 mg tablet, Take 1 tablet (40 mg total) by mouth daily with dinner, Disp: 90 tablet, Rfl: 1  •  ezetimibe (ZETIA) 10 mg tablet, TAKE 1 TABLET BY MOUTH EVERY DAY, Disp: 90 tablet, Rfl: 2  •  lisinopril (ZESTRIL) 20 mg tablet, Take 1 tablet (20 mg total) by mouth daily, Disp: 90 tablet, Rfl: 1  •  potassium chloride (MICRO-K) 10 MEQ CR capsule, Take 1 capsule (10 mEq total) by mouth daily, Disp: 90 capsule, Rfl: 0  •  amLODIPine (NORVASC) 5 mg tablet, Take 1 tablet (5 mg total) by mouth daily, Disp: 90 tablet, Rfl: 1  •  aspirin (ECOTRIN LOW STRENGTH) 81 mg EC tablet, Take 1 tablet (81 mg total) by mouth daily, Disp: 90 tablet, Rfl: 1  •  omega-3-acid ethyl esters (LOVAZA) 1 g capsule, Take 2 capsules (2 g total) by  mouth 2 (two) times a day (Patient not taking: Reported on 3/15/2024), Disp: 360 capsule, Rfl: 0    Current Allergies     Allergies as of 03/15/2024   • (No Known Allergies)            The following portions of the patient's history were reviewed and updated as appropriate: allergies, current medications, past family history, past medical history, past social history, past surgical history and problem list.     Past Medical History:   Diagnosis Date   • Cardiac disorder    • Congenital heart defect     last assessed 3/3/15, resolved 3/3/15   • Endometrial polyp        Past Surgical History:   Procedure Laterality Date   • CARDIAC SURGERY      valve repair    •  SECTION      x 2   • ENDOMETRIAL BIOPSY      by suction    • FOOT SURGERY Left    • INSERTION OF INTRAUTERINE DEVICE (IUD)     • REMOVAL OF INTRAUTERINE DEVICE (IUD)         Family History   Problem Relation Age of Onset   • Heart disease Mother    • No Known Problems Father    • Heart disease Maternal Grandmother    • Coronary artery disease Family    • No Known Problems Sister    • No Known Problems Maternal Grandfather    • No Known Problems Paternal Grandmother    • No Known Problems Paternal Grandfather    • No Known Problems Sister    • No Known Problems Maternal Aunt    • No Known Problems Maternal Aunt    • No Known Problems Maternal Aunt    • No Known Problems Maternal Aunt    • No Known Problems Paternal Aunt    • No Known Problems Paternal Aunt    • No Known Problems Paternal Aunt    • No Known Problems Paternal Aunt          Medications have been verified.        Objective   /100   Pulse 77   Temp (!) 97.4 °F (36.3 °C)   Resp 16   Ht 5' (1.524 m)   Wt 79.8 kg (176 lb)   LMP 2019 (Approximate)   SpO2 98%   BMI 34.37 kg/m²   Patient's last menstrual period was 2019 (approximate).       Physical Exam     Physical Exam  Vitals and nursing note reviewed.   Constitutional:       General: She is not in acute distress.      Appearance: Normal appearance. She is normal weight. She is not ill-appearing.   HENT:      Right Ear: Tympanic membrane normal.      Left Ear: Tympanic membrane normal.      Nose: Congestion and rhinorrhea present.      Mouth/Throat:      Mouth: Mucous membranes are moist.      Pharynx: Posterior oropharyngeal erythema present.   Cardiovascular:      Rate and Rhythm: Normal rate and regular rhythm.      Pulses: Normal pulses.      Heart sounds: Normal heart sounds.   Pulmonary:      Effort: Pulmonary effort is normal. No respiratory distress.      Breath sounds: Normal breath sounds. No stridor. No wheezing, rhonchi or rales.   Abdominal:      General: Abdomen is flat.   Musculoskeletal:      Cervical back: Normal range of motion.   Lymphadenopathy:      Cervical: No cervical adenopathy.   Skin:     General: Skin is warm and dry.      Capillary Refill: Capillary refill takes less than 2 seconds.   Neurological:      Mental Status: She is alert.

## 2024-03-15 NOTE — LETTER
March 15, 2024     Patient: Kathie Zazueta   YOB: 1970   Date of Visit: 3/15/2024       To Whom it May Concern:    Kathie Zazueta was seen in my clinic on 3/15/2024. She may return to work on 3/18/2024 .    If you have any questions or concerns, please don't hesitate to call.         Sincerely,          HARLEY Robb        CC: No Recipients

## 2024-03-15 NOTE — PATIENT INSTRUCTIONS
Take Coricidin for cough.  Take your blood pressure medications as prescribed each day.    If symptoms do not improve or worsen, please follow with PCP for further evaluation.     You need to quarantine at home for a minimum of 5 days; the day your symptoms started is DAY 0! You may end your quarantine when you are fever free without medications to reduce fever (e.g. acetaminophen/Tylenol) for 24 hours or after 5 full days of quarantine, whichever comes later. Anyone whom you have been in close regular contact (unmasked > 15 minute intervals) since you began having symptoms should be tested within 5-6 days of your positive test regardless of vaccination status or symptoms. Masks should be worn at all times whenever indoors until 10 days after your exposure or positive result. Anyone with whom you have been in close contact should mask for 10 days, if they develop symptoms at any point then their clock for masking and quarantine goes back to 0, meaning they need to quarantine 5 days and mask 10 days.       Take Mucinex DM as needed over the counter for cough in adults. Recommend Children's mucinex for children > 4 years and Zarbee's cough and cold or Gwendolyn drops for children 2-4 years old.   Recommend over the counter lozenges for any sore throat symptoms.  Motrin and/or Tylenol as needed for fever.   Recommend Vitamin C, Vitamin D3, multivitamin and Zinc for immune support. Discuss dosing recommendations with pharmacist especially in children and infants.   If your symptoms worsen or you develop shortness of breath report to the nearest emergency room.

## 2024-03-18 RX ORDER — AMLODIPINE BESYLATE 5 MG/1
5 TABLET ORAL DAILY
Qty: 90 TABLET | Refills: 0 | Status: SHIPPED | OUTPATIENT
Start: 2024-03-18 | End: 2024-04-17

## 2024-03-18 RX ORDER — ASPIRIN 81 MG/1
81 TABLET ORAL DAILY
Qty: 30 TABLET | Refills: 1 | Status: SHIPPED | OUTPATIENT
Start: 2024-03-18

## 2024-04-19 DIAGNOSIS — R07.9 CHEST PAIN: ICD-10-CM

## 2024-04-22 RX ORDER — AMLODIPINE BESYLATE 5 MG/1
5 TABLET ORAL DAILY
Qty: 90 TABLET | Refills: 0 | Status: SHIPPED | OUTPATIENT
Start: 2024-04-22 | End: 2024-07-21

## 2024-04-24 DIAGNOSIS — I10 HYPERTENSION, ESSENTIAL, BENIGN: ICD-10-CM

## 2024-04-24 RX ORDER — LISINOPRIL 20 MG/1
20 TABLET ORAL DAILY
Qty: 90 TABLET | Refills: 1 | Status: SHIPPED | OUTPATIENT
Start: 2024-04-24

## 2024-05-02 NOTE — ASSESSMENT & PLAN NOTE
Atorvastatin just restarted 2 weeks ago  Continue with this and repeat lipid panel in 12 weeks  Also continue [facility] HPI:    Patient ID: Rekha Moreno is a 54 year old female.    Rekha Moreno is a 54 year old female who presents for a complete physical exam.   HPI:     Chief Complaint   Patient presents with    Physical     Annual exam      FU gyne. In  mo nths.   No LMP recorded. Patient is premenopausal.    /71 (BP Location: Right arm, Patient Position: Sitting, Cuff Size: large)   Pulse 63   Temp 97.9 °F (36.6 °C) (Temporal)   Ht 5' 6\" (1.676 m)   Wt 259 lb (117.5 kg)   SpO2 95%   BMI 41.80 kg/m²    Wt Readings from Last 4 Encounters:   05/02/24 259 lb (117.5 kg)   02/05/24 255 lb (115.7 kg)   08/08/23 263 lb (119.3 kg)   04/25/23 263 lb (119.3 kg)     Body mass index is 41.8 kg/m².     Labs:   Lab Results   Component Value Date/Time    WBC 9.3 02/09/2024 09:04 AM    HGB 15.6 02/09/2024 09:04 AM    .0 02/09/2024 09:04 AM      Lab Results   Component Value Date/Time     (H) 02/09/2024 09:04 AM     02/09/2024 09:04 AM    K 4.6 02/09/2024 09:04 AM     02/09/2024 09:04 AM    CO2 27.0 02/09/2024 09:04 AM    CREATSERUM 1.05 (H) 02/09/2024 09:04 AM    CA 9.7 02/09/2024 09:04 AM    ALB 4.0 02/09/2024 09:04 AM    TP 7.1 02/09/2024 09:04 AM    ALKPHO 81 02/09/2024 09:04 AM    AST 42 (H) 02/09/2024 09:04 AM    ALT 63 (H) 02/09/2024 09:04 AM    BILT 0.6 02/09/2024 09:04 AM    TSH 1.712 02/09/2024 09:04 AM    T4F 1.1 02/09/2024 09:04 AM        Lab Results   Component Value Date/Time    CHOLEST 104 02/09/2024 09:04 AM    HDL 47 02/09/2024 09:04 AM    TRIG 74 02/09/2024 09:04 AM    LDL 42 02/09/2024 09:04 AM    NONHDLC 57 02/09/2024 09:04 AM       Lab Results   Component Value Date/Time    A1C 8.0 (H) 02/09/2024 09:04 AM      Lab Results   Component Value Date    VITD 39.3 02/09/2024         Health Maintenance   Topic Date Due    Mammogram  07/28/2024       Current Outpatient Medications   Medication Sig Dispense Refill    insulin degludec (TRESIBA FLEXTOUCH) 200 UNIT/ML Subcutaneous  Solution Pen-injector 40 units qam and 33 units qpm. 36 each 1    Continuous Glucose Sensor (FREESTYLE MARCELLE 3 SENSOR) Does not apply Misc 1 each every 14 (fourteen) days. 24 each 3    Continuous Glucose  (FREESTYLE MARCELLE READER) Does not apply Device Checks 3 times a day. 90 each 1    Continuous Glucose Sensor (FREESTYLE MARCELLE SENSOR SYSTEM) Does not apply Misc Checks 3 times  a day. 24 each 0    levothyroxine 100 MCG Oral Tab TAKE 1 TABLET BEFORE BREAKFAST 90 tablet 3    Continuous Blood Gluc Sensor (FREESTYLE MARCELLE 14 DAY SENSOR) Does not apply Misc Inject 1 Device into the skin every 14 (fourteen) days. USE AS DIRECTED 7 each 3    semaglutide (OZEMPIC, 2 MG/DOSE,) 8 MG/3ML Subcutaneous Solution Pen-injector Inject 2 mg into the skin once a week. 9 mL 3    amLODIPine 2.5 MG Oral Tab Take 1 tablet (2.5 mg total) by mouth 2 (two) times daily. 180 tablet 3    atenolol 25 MG Oral Tab Take 1 tablet (25 mg total) by mouth nightly. 90 tablet 3    spironolactone 100 MG Oral Tab TAKE 1 TABLET EVERY MORNING AND ONE-HALF (1/2) TABLET IN THE EVENING 135 tablet 3    rosuvastatin 5 MG Oral Tab Take 1 tablet (5 mg total) by mouth nightly.      Insulin Pen Needle (BD PEN NEEDLE LINA U/F) 32G X 4 MM Does not apply Misc Use as directed. 90 each 10    Candesartan Cilexetil 8 MG Oral Tab Take 1 tablet (8 mg total) by mouth 2 (two) times daily. 180 tablet 3    zolpidem (AMBIEN) 5 MG Oral Tab Take 1 tablet (5 mg total) by mouth nightly as needed for Sleep. 15 tablet 0    KRILL OIL OR Take 1 capsule by mouth daily.      Calcium Carbonate-Vitamin D (CALCIUM + D OR) Take by mouth.      famotidine 10 MG Oral Tab Take 1 tablet (10 mg total) by mouth 2 (two) times daily. 60 tablet 1    Multiple Vitamins-Minerals (MULTIVITAMIN OR) Take by mouth daily.        Olopatadine HCl 0.1 % Ophthalmic Solution Place 1 drop into both eyes 2 (two) times daily as needed.      insulin detemir (LEVEMIR FLEXTOUCH) 100 UNIT/ML Subcutaneous Solution  Pen-injector 32 units q12 hrs. (Patient not taking: Reported on 5/2/2024) 45 mL 3      Past Medical History:    Calculus of kidney    Cancer (HCC)    kidney    Diabetes (HCC)    Disorder of thyroid    Epicondylitis, lateral    B/L. [facility].     Essential hypertension    High blood pressure    PONV (postoperative nausea and vomiting)    difficulty arousing       Past Surgical History:   Procedure Laterality Date    Appendectomy  2008    Arthroscopy of joint unlisted Left 1-4-16    Labral tear reapir, iliopsoas burscetomy. (hip)    Arthroscopy of joint unlisted Right     bone spur and torn cartilage shoulder    Carpal tunnel release Right 2014    Carpal tunnel release Left 01/2019    [facility].    Cholecystectomy      Colonoscopy  05/2020    normal    Correct bunion,othr methods Right     Foot surgery Right     torn ligament repaired    Nephrectomy Right 10/01/2018    Partial nephrectomy    Other Right 2005    arthroscopy bone spur removal     Other Left 01/2019    Status post Left lateral epicondylar release.  Done by [facility].    Other Right     toe ligament repair      Family History   Problem Relation Age of Onset    Diabetes Father     Arthritis Father     Hypertension Father     Heart Disease Father 75        aortic valve disease    Cancer Father         Skin cancer.     Pacemaker Father     Arthritis Mother     Cancer Mother         lung, skin and MDS    Hypertension Mother     Heart Disorder Mother 60        CAD S/P MI.     Cancer Brother         Hodgkin's    Hypertension Brother     Cancer Maternal Grandmother         Hepatoma.     Cancer Paternal Uncle         Stomach.       Social History:  Social History     Socioeconomic History    Marital status:     Number of children: 0   Occupational History    Occupation: Finance.    Tobacco Use    Smoking status: Never    Smokeless tobacco: Never   Vaping Use    Vaping status: Never Used   Substance and Sexual Activity     Alcohol use: Yes     Alcohol/week: 0.0 standard drinks of alcohol     Comment: occassionally     Drug use: No    Sexual activity: Yes     Partners: Male     Birth control/protection: Pill   Other Topics Concern    Caffeine Concern Yes     Comment: 5 8oz of coffee and soda daily    Exercise No         OB History    Para Term  AB Living   0 0 0 0 0 0   SAB IAB Ectopic Multiple Live Births   0 0 0 0 0        Hx of Pap: all Paps normal           Patient here for follow up of Diabetes.  Has been taking medications regularly.    Checks sugars 3 times daily.  Fasting sugars [id number]'s.  lowest: 57.   Watches diabetic diet, low salt.  Diabetic Flow sheet      Latest Ref Rng & Units 2024     5:17 PM 2024     4:08 PM 2024    12:33 PM 2024    12:13 PM 2024     9:04 AM 2024     3:38 PM   DIABETIC FLOWSHEET ([facility])   BMI   41.8 kg/m2    41.16 kg/m2   Hgb A1c <5.7 %     8.0     Cholesterol Total <200 mg/dL     104     Triglycerides 30 - 149 mg/dL     74     HDL 40 - 59 mg/dL     47     LDL <100 mg/dL     42     HEMOGLOBIN A1c <5.7 %     8.0     Candesartan Cilexetil  8 mg BID OR    8 mg BID OR     Admitted Admitted 8 mg BID OR     8 mg BID OR       Weight (enc vitals)   259 lb    255 lb   BP (enc vitals)   /  122/67  123/85       Medication marked as long-term        Hypertension  Patient is here for follow up of hypertension. BP at home: not check.   Has been compliant with medications.  Exercise level: not active and has been following low salt diet.  Weight has been stable. Less apptite. Eatting 3 meals. Dinner at 6-7 PM: varies from chciken breast or grilled pork chop. Tresiba 9 PM and 630   Wt Readings from Last 3 Encounters:   24 259 lb (117.5 kg)   24 255 lb (115.7 kg)   23 263 lb (119.3 kg)     BP Readings from Last 3 Encounters:   24 114/71   24 122/67   24 123/85       Labs:   Lab Results   Component Value Date/Time     (H)  02/09/2024 09:04 AM     02/09/2024 09:04 AM    K 4.6 02/09/2024 09:04 AM     02/09/2024 09:04 AM    CO2 27.0 02/09/2024 09:04 AM    CREATSERUM 1.05 (H) 02/09/2024 09:04 AM    CA 9.7 02/09/2024 09:04 AM    AST 42 (H) 02/09/2024 09:04 AM    ALT 63 (H) 02/09/2024 09:04 AM    TSH 1.712 02/09/2024 09:04 AM    T4F 1.1 02/09/2024 09:04 AM        Lab Results   Component Value Date/Time    CHOLEST 104 02/09/2024 09:04 AM    HDL 47 02/09/2024 09:04 AM    TRIG 74 02/09/2024 09:04 AM    LDL 42 02/09/2024 09:04 AM    NONHDLC 57 02/09/2024 09:04 AM          Labs:   Lab Results   Component Value Date/Time     (H) 02/09/2024 09:04 AM     02/09/2024 09:04 AM    K 4.6 02/09/2024 09:04 AM     02/09/2024 09:04 AM    CO2 27.0 02/09/2024 09:04 AM    CREATSERUM 1.05 (H) 02/09/2024 09:04 AM    CA 9.7 02/09/2024 09:04 AM    AST 42 (H) 02/09/2024 09:04 AM    ALT 63 (H) 02/09/2024 09:04 AM    TSH 1.712 02/09/2024 09:04 AM    T4F 1.1 02/09/2024 09:04 AM        Lab Results   Component Value Date/Time    CHOLEST 104 02/09/2024 09:04 AM    HDL 47 02/09/2024 09:04 AM    TRIG 74 02/09/2024 09:04 AM    LDL 42 02/09/2024 09:04 AM    NONHDLC 57 02/09/2024 09:04 AM          Began after injury to the right ankle.  Was wearing a boot.  Injured sI joint with . SP PT and cortisone into SI joint. Awaiting facet joint injection from pain specialist.  Had a history of labral tear on the left and had tried physical therapy.  And cortisone injection it did not work.  Requiring surgery on the left hip for labral.  Right hip with similar symptoms.          Review of Systems   Constitutional:  Positive for activity change. Negative for appetite change, chills, diaphoresis, fatigue, fever and unexpected weight change.   HENT:  Negative for congestion, dental problem, drooling, ear discharge, ear pain, facial swelling, hearing loss, mouth sores, nosebleeds, postnasal drip, rhinorrhea, sinus pressure, sinus pain,  sneezing, sore throat, tinnitus, trouble swallowing and voice change.    Eyes:  Negative for photophobia, pain, discharge, redness, itching and visual disturbance.   Respiratory:  Negative for apnea, cough, choking, chest tightness, shortness of breath, wheezing and stridor.    Cardiovascular:  Negative for chest pain, palpitations and leg swelling.   Gastrointestinal:  Negative for abdominal distention, abdominal pain, anal bleeding, blood in stool, constipation, diarrhea, nausea, rectal pain and vomiting.   Endocrine: Negative for cold intolerance, heat intolerance, polydipsia, polyphagia and polyuria.   Genitourinary:  Negative for decreased urine volume, difficulty urinating, dysuria, flank pain, frequency, hematuria, menstrual problem, pelvic pain, urgency, vaginal bleeding, vaginal discharge and vaginal pain.   Skin:  Negative for rash.   Neurological:  Negative for dizziness, tremors, seizures, syncope, facial asymmetry, speech difficulty, weakness, light-headedness, numbness and headaches.   Psychiatric/Behavioral:  Negative for agitation, behavioral problems, confusion, decreased concentration, dysphoric mood, hallucinations, self-injury, sleep disturbance and suicidal ideas. The patient is not nervous/anxious and is not hyperactive.    All other systems reviewed and are negative.        Current Outpatient Medications   Medication Sig Dispense Refill    insulin degludec (TRESIBA FLEXTOUCH) 200 UNIT/ML Subcutaneous Solution Pen-injector 40 units qam and 33 units qpm. 36 each 1    Continuous Glucose Sensor (FREESTYLE MARCELLE 3 SENSOR) Does not apply Misc 1 each every 14 (fourteen) days. 24 each 3    Continuous Glucose  (FREESTYLE MARCELLE READER) Does not apply Device Checks 3 times a day. 90 each 1    Continuous Glucose Sensor (FREESTYLE MARCELLE SENSOR SYSTEM) Does not apply Misc Checks 3 times  a day. 24 each 0    levothyroxine 100 MCG Oral Tab TAKE 1 TABLET BEFORE BREAKFAST 90 tablet 3    Continuous Blood  Gluc Sensor (FREESTYLE MARCELLE 14 DAY SENSOR) Does not apply Misc Inject 1 Device into the skin every 14 (fourteen) days. USE AS DIRECTED 7 each 3    semaglutide (OZEMPIC, 2 MG/DOSE,) 8 MG/3ML Subcutaneous Solution Pen-injector Inject 2 mg into the skin once a week. 9 mL 3    amLODIPine 2.5 MG Oral Tab Take 1 tablet (2.5 mg total) by mouth 2 (two) times daily. 180 tablet 3    atenolol 25 MG Oral Tab Take 1 tablet (25 mg total) by mouth nightly. 90 tablet 3    spironolactone 100 MG Oral Tab TAKE 1 TABLET EVERY MORNING AND ONE-HALF (1/2) TABLET IN THE EVENING 135 tablet 3    rosuvastatin 5 MG Oral Tab Take 1 tablet (5 mg total) by mouth nightly.      Insulin Pen Needle (BD PEN NEEDLE LINA U/F) 32G X 4 MM Does not apply Misc Use as directed. 90 each 10    Candesartan Cilexetil 8 MG Oral Tab Take 1 tablet (8 mg total) by mouth 2 (two) times daily. 180 tablet 3    zolpidem (AMBIEN) 5 MG Oral Tab Take 1 tablet (5 mg total) by mouth nightly as needed for Sleep. 15 tablet 0    KRILL OIL OR Take 1 capsule by mouth daily.      Calcium Carbonate-Vitamin D (CALCIUM + D OR) Take by mouth.      famotidine 10 MG Oral Tab Take 1 tablet (10 mg total) by mouth 2 (two) times daily. 60 tablet 1    Multiple Vitamins-Minerals (MULTIVITAMIN OR) Take by mouth daily.        Olopatadine HCl 0.1 % Ophthalmic Solution Place 1 drop into both eyes 2 (two) times daily as needed.      insulin detemir (LEVEMIR FLEXTOUCH) 100 UNIT/ML Subcutaneous Solution Pen-injector 32 units q12 hrs. (Patient not taking: Reported on 5/2/2024) 45 mL 3     Allergies:  Allergies   Allergen Reactions    Lisinopril Coughing    Metformin DIARRHEA       HISTORY:  Past Medical History:    Calculus of kidney    Cancer (HCC)    kidney    Diabetes (HCC)    Disorder of thyroid    Epicondylitis, lateral    B/L. [facility].     Essential hypertension    High blood pressure    PONV (postoperative nausea and vomiting)    difficulty arousing       Past Surgical History:    Procedure Laterality Date    Appendectomy  2008    Arthroscopy of joint unlisted Left 1-4-16    Labral tear reapir, iliopsoas burscetomy. (hip)    Arthroscopy of joint unlisted Right     bone spur and torn cartilage shoulder    Carpal tunnel release Right 2014    Carpal tunnel release Left 01/2019    [facility].    Cholecystectomy      Colonoscopy  05/2020    normal    Correct bunion,othr methods Right     Foot surgery Right     torn ligament repaired    Nephrectomy Right 10/01/2018    Partial nephrectomy    Other Right 2005    arthroscopy bone spur removal     Other Left 01/2019    Status post Left lateral epicondylar release.  Done by [facility].    Other Right     toe ligament repair      Family History   Problem Relation Age of Onset    Diabetes Father     Arthritis Father     Hypertension Father     Heart Disease Father 75        aortic valve disease    Cancer Father         Skin cancer.     Pacemaker Father     Arthritis Mother     Cancer Mother         lung, skin and MDS    Hypertension Mother     Heart Disorder Mother 60        CAD S/P MI.     Cancer Brother         Hodgkin's    Hypertension Brother     Cancer Maternal Grandmother         Hepatoma.     Cancer Paternal Uncle         Stomach.       Social History:   Social History     Socioeconomic History    Marital status:     Number of children: 0   Occupational History    Occupation: Finance.    Tobacco Use    Smoking status: Never    Smokeless tobacco: Never   Vaping Use    Vaping status: Never Used   Substance and Sexual Activity    Alcohol use: Yes     Alcohol/week: 0.0 standard drinks of alcohol     Comment: occassionally     Drug use: No    Sexual activity: Yes     Partners: Male     Birth control/protection: Pill   Other Topics Concern    Caffeine Concern Yes     Comment: 5 8oz of coffee and soda daily    Exercise No   Social History Narrative    The patient does not use an assistive device..      The patient does live in  a home with stairs.        PHYSICAL EXAM:   /71 (BP Location: Right arm, Patient Position: Sitting, Cuff Size: large)   Pulse 63   Temp 97.9 °F (36.6 °C) (Temporal)   Ht 5' 6\" (1.676 m)   Wt 259 lb (117.5 kg)   SpO2 95%   BMI 41.80 kg/m²   BP Readings from Last 3 Encounters:   05/02/24 114/71   02/12/24 122/67   02/05/24 123/85     Wt Readings from Last 3 Encounters:   05/02/24 259 lb (117.5 kg)   02/05/24 255 lb (115.7 kg)   08/08/23 263 lb (119.3 kg)       Physical Exam  Vitals and nursing note reviewed.   Constitutional:       General: She is not in acute distress.     Appearance: Normal appearance. She is well-developed and well-groomed. She is not ill-appearing, toxic-appearing or diaphoretic.      Interventions: She is not intubated.  HENT:      Head: Normocephalic and atraumatic.      Right Ear: Tympanic membrane, ear canal and external ear normal. No decreased hearing noted. No laceration, drainage, swelling or tenderness. No middle ear effusion. There is no impacted cerumen. No foreign body. No mastoid tenderness. No PE tube. No hemotympanum. Tympanic membrane is not injected, scarred, perforated, erythematous, retracted or bulging. Tympanic membrane has normal mobility.      Left Ear: Tympanic membrane, ear canal and external ear normal. No decreased hearing noted. No laceration, drainage, swelling or tenderness.  No middle ear effusion. There is no impacted cerumen. No foreign body. No mastoid tenderness. No PE tube. No hemotympanum. Tympanic membrane is not injected, scarred, perforated, erythematous, retracted or bulging. Tympanic membrane has normal mobility.      Nose:      Right Sinus: No maxillary sinus tenderness or frontal sinus tenderness.      Left Sinus: No maxillary sinus tenderness or frontal sinus tenderness.      Mouth/Throat:      Lips: Pink. No lesions.      Mouth: Mucous membranes are moist. No injury, lacerations, oral lesions or angioedema.      Dentition: Does not have  dentures. No dental tenderness, gingival swelling, dental caries, dental abscesses or gum lesions.      Tongue: No lesions. Tongue does not deviate from midline.      Palate: No mass and lesions.      Pharynx: Oropharynx is clear. Uvula midline. No pharyngeal swelling, oropharyngeal exudate, posterior oropharyngeal erythema or uvula swelling.      Tonsils: No tonsillar exudate or tonsillar abscesses.   Eyes:      General: Lids are normal. No scleral icterus.        Right eye: No foreign body, discharge or hordeolum.         Left eye: No foreign body, discharge or hordeolum.      Extraocular Movements: Extraocular movements intact.      Right eye: Normal extraocular motion and no nystagmus.      Left eye: Normal extraocular motion and no nystagmus.      Conjunctiva/sclera: Conjunctivae normal.      Right eye: Right conjunctiva is not injected. No chemosis, exudate or hemorrhage.     Left eye: Left conjunctiva is not injected. No chemosis, exudate or hemorrhage.     Pupils: Pupils are equal, round, and reactive to light.   Neck:      Thyroid: No thyroid mass, thyromegaly or thyroid tenderness.      Vascular: Normal carotid pulses. No carotid bruit, hepatojugular reflux or JVD.      Trachea: Trachea and phonation normal. No tracheal tenderness, tracheostomy, abnormal tracheal secretions or tracheal deviation.   Cardiovascular:      Rate and Rhythm: Normal rate and regular rhythm.      Pulses: Normal pulses.           Carotid pulses are 2+ on the right side and 2+ on the left side.       Radial pulses are 2+ on the right side and 2+ on the left side.        Dorsalis pedis pulses are 2+ on the right side and 2+ on the left side.        Posterior tibial pulses are 2+ on the right side and 2+ on the left side.      Heart sounds: Normal heart sounds, S1 normal and S2 normal.   Pulmonary:      Effort: Pulmonary effort is normal. No tachypnea, bradypnea, accessory muscle usage, prolonged expiration, respiratory distress or  retractions. She is not intubated.      Breath sounds: Normal breath sounds and air entry. No stridor, decreased air movement or transmitted upper airway sounds. No decreased breath sounds, wheezing, rhonchi or rales.   Chest:      Chest wall: No tenderness.      Comments: Breast exam: deferred due to being done by gyne. per pt.   Abdominal:      General: Bowel sounds are normal. There is no distension.      Palpations: Abdomen is soft. Abdomen is not rigid. There is no fluid wave, hepatomegaly, splenomegaly or mass.      Tenderness: There is no abdominal tenderness. There is no right CVA tenderness, left CVA tenderness, guarding or rebound.   Musculoskeletal:      Cervical back: Neck supple. No tenderness.      Lumbar back: No swelling, edema, deformity, signs of trauma, lacerations, spasms, tenderness or bony tenderness. Normal range of motion. Negative right straight leg raise test and negative left straight leg raise test. No scoliosis.      Right hip: No deformity, lacerations, tenderness, bony tenderness or crepitus. Decreased range of motion. Normal strength.      Left hip: No deformity, lacerations, tenderness, bony tenderness or crepitus. Normal range of motion. Normal strength.      Right lower leg: No edema.      Left lower leg: No edema.   Lymphadenopathy:      Head:      Right side of head: No submental, submandibular, preauricular, posterior auricular or occipital adenopathy.      Left side of head: No submental, submandibular, preauricular, posterior auricular or occipital adenopathy.      Cervical: No cervical adenopathy.      Right cervical: No superficial, deep or posterior cervical adenopathy.     Left cervical: No superficial, deep or posterior cervical adenopathy.      Upper Body:      Right upper body: No supraclavicular adenopathy.      Left upper body: No supraclavicular adenopathy.   Skin:     General: Skin is warm and dry.      Coloration: Skin is not pale.      Findings: No erythema or  rash.   Neurological:      Mental Status: She is alert and oriented to person, place, and time.   Psychiatric:         Mood and Affect: Mood normal.         Speech: Speech normal.         Behavior: Behavior normal. Behavior is cooperative.     Bilateral barefoot skin diabetic exam is normal, visualized feet and the appearance is normal.  Bilateral monofilament sensation of both feet is normal.  Pulsation pedal pulse exam of both lower legs/feet is normal as well.            ASSESSMENT/PLAN:     Encounter Diagnoses   Name Primary?    Adult general medical exam Check urine.    Yes    Encounter for screening mammogram for malignant neoplasm of breast Normal mammogram. Continue self breast exam every month.  Get order from gyne. per pt. thru [facility] for after 7-28-24.        Chronic low back pain with sciatica, sciatica laterality unspecified, unspecified back pain laterality FU pain clinic for SI joint possibly. Hold meds.  Cannot do NSAIDs.  Does not want to be groggy.  Cannot tolerate narcotics due to nausea.  Physical therapy with no benefit.  Cortisone into hip mild help.  Awaiting follow-up with pain service.  Can try capsaicin or Tiger balm to area with gloves on up to 3 times a day.       Chronic kidney disease (CKD) stage G2/A2, mildly decreased glomerular filtration rate (GFR) between 60-89 mL/min/1.73 square meter and albuminuria creatinine ratio between  mg/g No motrin, ibuprofen, advil, alleve, naprosyn  with these medications.         Dry eyes Stable.        Elevated liver enzymes Stable.  Liver ultrasound with elastography noted.  Weight loss will help.  Hard to get back into daily activity due to back pain.       Constipation, unspecified constipation type  Improved.         Colon cancer screening Up to date.        Fatty liver disease, nonalcoholic Improved. Weight loss.        Primary hypertension Stable. Careful with diet and excercise at least 30 minutes 3-4 times a week. Check blood pressures at  different times on different days. Can purchase own blood pressure monitor. If not, check at local pharmacy. Bake foods more and grill occasionally. Avoid fried foods. No salt. Use other seasonings.          Hypothyroidism due to acquired atrophy of thyroid Stable.        Multinodular goiter thyroid ultrasound without changes.  Will do be due for recheck June 1, 2024.       Jeffery neuroma, right       Migraine without aura and with status migrainosus, not intractable Stable.        Vaccine counseling PCV 20 today.        Vitamin D deficiency Stable.        Type 2 diabetes mellitus without complication, without long-term current use of insulin (HCC) some in the mornings and higher in the mid afternoons.  Adjust Tresiba.  Call in 1 to 2 weeks with sugars.  Check blood May 9, 2024.  Check urine today.Careful with diet and excercise at least 30 minutes 3-4 times a week. Check sugars at different times on different dates. Careful with low sugars. Carry something with you and check sugar if can. Can carry aristides cracker, etc. Decrease carbohydrates. But also, careful with fruits and natural sugars.One serving a day and no more than 1 handful every day. Check feet  every AM and careful with sores and ulcers on feet bilaterally. Check eyes every year with dilated eye exam.  Check sugars.  2-hour postmeal should be less than 140s.  Pre-meal should be 's.  Both equally affected A1c.  Discussed importance of glycemic control to prevent complications of diabetes  -Discussed complications of diabetes include retinopathy, neuropathy, nephropathy and cardiovascular disease  -Discussed ABCs of DM  -Discussed importance of SBGM  -Discussed importance of low CHO diet, recommend 45gm per meal or 135gm per day  -Normal foot exam  -UTD with optho  -Normotensive        Pain of right hip due to labral tear.  Has tried physical therapy and cortisone injections.  May need surgery.  But difficult to do at present time.        Orders  Placed This Encounter   Procedures    Microalb/Creat Ratio, Random Urine    URINALYSIS, AUTO, W/O SCOPE    Prevnar 20 (PCV20) [35834]       Meds This Visit:  Requested Prescriptions     Signed Prescriptions Disp Refills    insulin degludec (TRESIBA FLEXTOUCH) 200 UNIT/ML Subcutaneous Solution Pen-injector 36 each 1     Si units qam and 33 units qpm.    Continuous Glucose Sensor (FREESTYLE MARCELLE 3 SENSOR) Does not apply Misc 24 each 3     Si each every 14 (fourteen) days.    Continuous Glucose  (FREESTYLE MARCELLE READER) Does not apply Device 90 each 1     Sig: Checks 3 times a day.    Continuous Glucose Sensor (FREESTYLE MARCELLE SENSOR SYSTEM) Does not apply Misc 24 each 0     Sig: Checks 3 times  a day.       Imaging & Referrals:  PCV20 VACCINE FOR INTRAMUSCULAR USE        RTC 3 months for FU DM.

## 2024-05-15 ENCOUNTER — APPOINTMENT (EMERGENCY)
Dept: RADIOLOGY | Facility: HOSPITAL | Age: 54
End: 2024-05-15
Payer: COMMERCIAL

## 2024-05-15 ENCOUNTER — HOSPITAL ENCOUNTER (EMERGENCY)
Facility: HOSPITAL | Age: 54
Discharge: HOME | End: 2024-05-15
Attending: EMERGENCY MEDICINE
Payer: COMMERCIAL

## 2024-05-15 VITALS
RESPIRATION RATE: 16 BRPM | TEMPERATURE: 97.3 F | SYSTOLIC BLOOD PRESSURE: 163 MMHG | DIASTOLIC BLOOD PRESSURE: 85 MMHG | OXYGEN SATURATION: 100 % | HEART RATE: 66 BPM

## 2024-05-15 DIAGNOSIS — E87.6 HYPOKALEMIA: ICD-10-CM

## 2024-05-15 DIAGNOSIS — R51.9 HEADACHE: Primary | ICD-10-CM

## 2024-05-15 LAB
ANION GAP SERPL CALCULATED.3IONS-SCNC: 10 MMOL/L (ref 4–13)
BASOPHILS # BLD AUTO: 0.03 THOUSANDS/ÂΜL (ref 0–0.1)
BASOPHILS NFR BLD AUTO: 0 % (ref 0–1)
BUN SERPL-MCNC: 12 MG/DL (ref 5–25)
CALCIUM SERPL-MCNC: 9.1 MG/DL (ref 8.4–10.2)
CHLORIDE SERPL-SCNC: 103 MMOL/L (ref 96–108)
CO2 SERPL-SCNC: 25 MMOL/L (ref 21–32)
CREAT SERPL-MCNC: 0.52 MG/DL (ref 0.6–1.3)
EOSINOPHIL # BLD AUTO: 0.18 THOUSAND/ÂΜL (ref 0–0.61)
EOSINOPHIL NFR BLD AUTO: 2 % (ref 0–6)
ERYTHROCYTE [DISTWIDTH] IN BLOOD BY AUTOMATED COUNT: 12 % (ref 11.6–15.1)
GFR SERPL CREATININE-BSD FRML MDRD: 107 ML/MIN/1.73SQ M
GLUCOSE SERPL-MCNC: 136 MG/DL (ref 65–140)
HCT VFR BLD AUTO: 42.8 % (ref 34.8–46.1)
HGB BLD-MCNC: 14.6 G/DL (ref 11.5–15.4)
IMM GRANULOCYTES # BLD AUTO: 0.05 THOUSAND/UL (ref 0–0.2)
IMM GRANULOCYTES NFR BLD AUTO: 1 % (ref 0–2)
LYMPHOCYTES # BLD AUTO: 2.36 THOUSANDS/ÂΜL (ref 0.6–4.47)
LYMPHOCYTES NFR BLD AUTO: 26 % (ref 14–44)
MCH RBC QN AUTO: 27.7 PG (ref 26.8–34.3)
MCHC RBC AUTO-ENTMCNC: 34.1 G/DL (ref 31.4–37.4)
MCV RBC AUTO: 81 FL (ref 82–98)
MONOCYTES # BLD AUTO: 0.51 THOUSAND/ÂΜL (ref 0.17–1.22)
MONOCYTES NFR BLD AUTO: 6 % (ref 4–12)
NEUTROPHILS # BLD AUTO: 5.94 THOUSANDS/ÂΜL (ref 1.85–7.62)
NEUTS SEG NFR BLD AUTO: 65 % (ref 43–75)
NRBC BLD AUTO-RTO: 0 /100 WBCS
PLATELET # BLD AUTO: 262 THOUSANDS/UL (ref 149–390)
PMV BLD AUTO: 11 FL (ref 8.9–12.7)
POTASSIUM SERPL-SCNC: 3.1 MMOL/L (ref 3.5–5.3)
RBC # BLD AUTO: 5.27 MILLION/UL (ref 3.81–5.12)
SODIUM SERPL-SCNC: 138 MMOL/L (ref 135–147)
WBC # BLD AUTO: 9.07 THOUSAND/UL (ref 4.31–10.16)

## 2024-05-15 PROCEDURE — 99284 EMERGENCY DEPT VISIT MOD MDM: CPT

## 2024-05-15 PROCEDURE — 36415 COLL VENOUS BLD VENIPUNCTURE: CPT

## 2024-05-15 PROCEDURE — 96366 THER/PROPH/DIAG IV INF ADDON: CPT

## 2024-05-15 PROCEDURE — 99285 EMERGENCY DEPT VISIT HI MDM: CPT | Performed by: EMERGENCY MEDICINE

## 2024-05-15 PROCEDURE — 85025 COMPLETE CBC W/AUTO DIFF WBC: CPT

## 2024-05-15 PROCEDURE — 70498 CT ANGIOGRAPHY NECK: CPT

## 2024-05-15 PROCEDURE — 70496 CT ANGIOGRAPHY HEAD: CPT

## 2024-05-15 PROCEDURE — 96365 THER/PROPH/DIAG IV INF INIT: CPT

## 2024-05-15 PROCEDURE — 80048 BASIC METABOLIC PNL TOTAL CA: CPT

## 2024-05-15 PROCEDURE — 96375 TX/PRO/DX INJ NEW DRUG ADDON: CPT

## 2024-05-15 RX ORDER — POTASSIUM CHLORIDE 20MEQ/15ML
40 LIQUID (ML) ORAL ONCE
Status: COMPLETED | OUTPATIENT
Start: 2024-05-15 | End: 2024-05-15

## 2024-05-15 RX ORDER — METOCLOPRAMIDE HYDROCHLORIDE 5 MG/ML
10 INJECTION INTRAMUSCULAR; INTRAVENOUS ONCE
Status: COMPLETED | OUTPATIENT
Start: 2024-05-15 | End: 2024-05-15

## 2024-05-15 RX ORDER — ACETAMINOPHEN 325 MG/1
975 TABLET ORAL ONCE
Status: COMPLETED | OUTPATIENT
Start: 2024-05-15 | End: 2024-05-15

## 2024-05-15 RX ORDER — DIPHENHYDRAMINE HYDROCHLORIDE 50 MG/ML
25 INJECTION INTRAMUSCULAR; INTRAVENOUS ONCE
Status: COMPLETED | OUTPATIENT
Start: 2024-05-15 | End: 2024-05-15

## 2024-05-15 RX ORDER — MAGNESIUM SULFATE HEPTAHYDRATE 40 MG/ML
2 INJECTION, SOLUTION INTRAVENOUS ONCE
Status: COMPLETED | OUTPATIENT
Start: 2024-05-15 | End: 2024-05-15

## 2024-05-15 RX ORDER — DEXAMETHASONE SODIUM PHOSPHATE 10 MG/ML
10 INJECTION, SOLUTION INTRAMUSCULAR; INTRAVENOUS ONCE
Status: COMPLETED | OUTPATIENT
Start: 2024-05-15 | End: 2024-05-15

## 2024-05-15 RX ADMIN — METOCLOPRAMIDE HYDROCHLORIDE 10 MG: 5 INJECTION INTRAMUSCULAR; INTRAVENOUS at 07:35

## 2024-05-15 RX ADMIN — DIPHENHYDRAMINE HYDROCHLORIDE 25 MG: 50 INJECTION, SOLUTION INTRAMUSCULAR; INTRAVENOUS at 07:36

## 2024-05-15 RX ADMIN — POTASSIUM CHLORIDE 40 MEQ: 1.5 SOLUTION ORAL at 08:58

## 2024-05-15 RX ADMIN — MAGNESIUM SULFATE HEPTAHYDRATE 2 G: 40 INJECTION, SOLUTION INTRAVENOUS at 07:39

## 2024-05-15 RX ADMIN — DEXAMETHASONE SODIUM PHOSPHATE 10 MG: 10 INJECTION, SOLUTION INTRAMUSCULAR; INTRAVENOUS at 07:35

## 2024-05-15 RX ADMIN — ACETAMINOPHEN 975 MG: 325 TABLET, FILM COATED ORAL at 07:40

## 2024-05-15 RX ADMIN — IOHEXOL 85 ML: 350 INJECTION, SOLUTION INTRAVENOUS at 08:45

## 2024-05-15 RX ADMIN — SODIUM CHLORIDE 1000 ML: 0.9 INJECTION, SOLUTION INTRAVENOUS at 07:36

## 2024-05-15 NOTE — ED ATTENDING ATTESTATION
5/15/2024  I, Andrew Corley MD, saw and evaluated the patient. I have discussed the patient with the resident/non-physician practitioner and agree with the resident's/non-physician practitioner's findings, Plan of Care, and MDM as documented in the resident's/non-physician practitioner's note, except where noted. All available labs and Radiology studies were reviewed.  I was present for key portions of any procedure(s) performed by the resident/non-physician practitioner and I was immediately available to provide assistance.       At this point I agree with the current assessment done in the Emergency Department.  I have conducted an independent evaluation of this patient a history and physical is as follows:    ED Course     54-year-old female, history of headaches, history of hypertension, previous CTA performed on 8/24/2023 demonstrating a small outpouching along the right ICA, presenting to the emergency department for evaluation of headache and reported double vision.  Patient reports onset this morning of gradually worsening headache.  Patient reports double vision.  Positive photophobia.  Positive nausea without vomiting.  No fever or neck pain.    On examination the patient appears uncomfortable.  She is attempting to hide her eyes.  Eyelids lashes are normal.  No conjunctival icterus.  Pupils are 3 mm bilaterally reactive.  Extraocular movements are intact.  The eyes appear symmetric with a symmetric reflex.  The patient does not have double vision with attempting to count fingers.  Neck is supple without meningismus.  Lungs are clear to auscultation bilaterally with no wheezes rales or rhonchi.  Heart is regular rate and rhythm with no murmurs rubs or gallops.  Abdomen is nondistended, soft and nontender.  GCS is 15.  Cranial nerves II through XII are intact.  Motor is 5 out of 5 bilateral upper and lower extremities.  Normal finger-to-nose.    MEDICAL DECISION MAKING    Number and Complexity of  Problems  Differential diagnosis: Migraine headache, subarachnoid hemorrhage, given normal neurologic exam doubt stroke despite the complaint of double vision.    Medical Decision Making Data  External documents reviewed: Reviewed CT scan from August 2023.  My CT interpretation: No significant change from prior.    CTA head and neck with and without contrast   ED Interpretation   No significant change from previous.      Final Result      No acute intracranial pathology. Mild chronic microangiopathy.   No significant stenosis or large vessel occlusion.   Stable 1 mm focal outpouching from the cavernous right internal carotid artery that could represent tiny aneurysm.            Workstation performed: [id number]             Labs Reviewed   CBC AND DIFFERENTIAL - Abnormal       Result Value Ref Range Status    WBC 9.07  4.31 - 10.16 Thousand/uL Final    RBC 5.27 (*) 3.81 - 5.12 Million/uL Final    Hemoglobin 14.6  11.5 - 15.4 g/dL Final    Hematocrit 42.8  34.8 - 46.1 % Final    MCV 81 (*) 82 - 98 fL Final    MCH 27.7  26.8 - 34.3 pg Final    MCHC 34.1  31.4 - 37.4 g/dL Final    RDW 12.0  11.6 - 15.1 % Final    MPV 11.0  8.9 - 12.7 fL Final    Platelets 262  149 - 390 Thousands/uL Final    nRBC 0  /100 WBCs Final    Segmented % 65  43 - 75 % Final    Immature Grans % 1  0 - 2 % Final    Lymphocytes % 26  14 - 44 % Final    Monocytes % 6  4 - 12 % Final    Eosinophils Relative 2  0 - 6 % Final    Basophils Relative 0  0 - 1 % Final    Absolute Neutrophils 5.94  1.85 - 7.62 Thousands/µL Final    Absolute Immature Grans 0.05  0.00 - 0.20 Thousand/uL Final    Absolute Lymphocytes 2.36  0.60 - 4.47 Thousands/µL Final    Absolute Monocytes 0.51  0.17 - 1.22 Thousand/µL Final    Eosinophils Absolute 0.18  0.00 - 0.61 Thousand/µL Final    Basophils Absolute 0.03  0.00 - 0.10 Thousands/µL Final   BASIC METABOLIC PANEL - Abnormal    Sodium 138  135 - 147 mmol/L Final    Potassium 3.1 (*) 3.5 - 5.3 mmol/L Final    Chloride 103   96 - 108 mmol/L Final    CO2 25  21 - 32 mmol/L Final    ANION GAP 10  4 - 13 mmol/L Final    BUN 12  5 - 25 mg/dL Final    Creatinine 0.52 (*) 0.60 - 1.30 mg/dL Final    Comment: Standardized to IDMS reference method    Glucose 136  65 - 140 mg/dL Final    Comment: If the patient is fasting, the ADA then defines impaired fasting glucose as > 100 mg/dL and diabetes as > or equal to 123 mg/dL.    Calcium 9.1  8.4 - 10.2 mg/dL Final    eGFR 107  ml/min/1.73sq m Final    Narrative:     National Kidney Disease Foundation guidelines for Chronic Kidney Disease (CKD):     Stage 1 with normal or high GFR (GFR > 90 mL/min/1.73 square meters)    Stage 2 Mild CKD (GFR = 60-89 mL/min/1.73 square meters)    Stage 3A Moderate CKD (GFR = 45-59 mL/min/1.73 square meters)    Stage 3B Moderate CKD (GFR = 30-44 mL/min/1.73 square meters)    Stage 4 Severe CKD (GFR = 15-29 mL/min/1.73 square meters)    Stage 5 End Stage CKD (GFR <15 mL/min/1.73 square meters)  Note: GFR calculation is accurate only with a steady state creatinine       Labs reviewed by me are significant for:  No acute lab abnormalities.    Treatment and Disposition  ED course: Patient underwent evaluation with CT/CTA which was independently visualized by myself.  Patient treated as migraine with improvement.  Patient agreeable with outpatient follow-up.  Shared decision making: Patient agrees to outpatient follow-up.  Code status: Full code.              Critical Care Time  Procedures

## 2024-05-15 NOTE — ED PROVIDER NOTES
History  Chief Complaint   Patient presents with    Headache     Started 1 hour ago with headache, nausea and vomiting c/o double vision     Patient is a 54-year-old female history of hypertension, concern for cerebral aneurysm CT scan with follow-up with neurosurgery, presents emergency department complaining of headache that started and arrived to work this morning.  States the headache reached maximal intensity and 20 minutes time.  Is complaining of left-sided head pain, neck discomfort, double vision.  Is able to walk without assistance or difficulty.  She denies any vomiting however endorses nausea.  She denies any falls or trauma.  She states she has not taken aspirin in the last 2 weeks.  She denies any heavy lifting.     Note from neurosurgery appointment on 10/10/2023 was reviewed.  There was concern for right ICA aneurysm versus infundibulum.  No family history of aneurysms.  She was evaluated in the emergency department on 5/20/23 for sudden onset headache, neck pain, nausea and visual change.  It is likely that this abnormal CT finding represents infundibulum and not aneurysm.        Prior to Admission Medications   Prescriptions Last Dose Informant Patient Reported? Taking?   amLODIPine (NORVASC) 5 mg tablet   No No   Sig: Take 1 tablet (5 mg total) by mouth daily   amLODIPine (NORVASC) 5 mg tablet   No No   Sig: Take 1 tablet (5 mg total) by mouth daily   amLODIPine (NORVASC) 5 mg tablet   No No   Sig: Take 1 tablet (5 mg total) by mouth daily   aspirin (ECOTRIN LOW STRENGTH) 81 mg EC tablet   No No   Sig: Take 1 tablet (81 mg total) by mouth daily   aspirin (ECOTRIN LOW STRENGTH) 81 mg EC tablet   No No   Sig: Take 1 tablet (81 mg total) by mouth daily   atorvastatin (LIPITOR) 40 mg tablet  Self No No   Sig: Take 1 tablet (40 mg total) by mouth daily with dinner   ezetimibe (ZETIA) 10 mg tablet   No No   Sig: TAKE 1 TABLET BY MOUTH EVERY DAY   lisinopril (ZESTRIL) 20 mg tablet   No No   Sig: Take 1  tablet (20 mg total) by mouth daily   omega-3-acid ethyl esters (LOVAZA) 1 g capsule   No No   Sig: Take 2 capsules (2 g total) by mouth 2 (two) times a day   Patient not taking: Reported on 3/15/2024   potassium chloride (MICRO-K) 10 MEQ CR capsule   No No   Sig: Take 1 capsule (10 mEq total) by mouth daily      Facility-Administered Medications: None       Past Medical History:   Diagnosis Date    Cardiac disorder     Congenital heart defect     last assessed 3/3/15, resolved 3/3/15    Endometrial polyp        Past Surgical History:   Procedure Laterality Date    CARDIAC SURGERY      valve repair      SECTION      x 2    ENDOMETRIAL BIOPSY      by suction     FOOT SURGERY Left     INSERTION OF INTRAUTERINE DEVICE (IUD)      REMOVAL OF INTRAUTERINE DEVICE (IUD)         Family History   Problem Relation Age of Onset    Heart disease Mother     No Known Problems Father     Heart disease Maternal Grandmother     Coronary artery disease Family     No Known Problems Sister     No Known Problems Maternal Grandfather     No Known Problems Paternal Grandmother     No Known Problems Paternal Grandfather     No Known Problems Sister     No Known Problems Maternal Aunt     No Known Problems Maternal Aunt     No Known Problems Maternal Aunt     No Known Problems Maternal Aunt     No Known Problems Paternal Aunt     No Known Problems Paternal Aunt     No Known Problems Paternal Aunt     No Known Problems Paternal Aunt      I have reviewed and agree with the history as documented.    E-Cigarette/Vaping    E-Cigarette Use Never User      E-Cigarette/Vaping Substances    Nicotine No     THC No     CBD No     Flavoring No     Other No     Unknown No      Social History     Tobacco Use    Smoking status: Never    Smokeless tobacco: Never   Vaping Use    Vaping status: Never Used   Substance Use Topics    Alcohol use: No    Drug use: No        Review of Systems   Eyes:  Positive for photophobia and visual disturbance.    Gastrointestinal:  Positive for nausea. Negative for vomiting.   Musculoskeletal:  Positive for neck pain.   Neurological:  Positive for headaches.   All other systems reviewed and are negative.      Physical Exam  ED Triage Vitals   Temperature Pulse Respirations Blood Pressure SpO2   05/15/24 0649 05/15/24 0649 05/15/24 0830 05/15/24 0649 05/15/24 0649   (!) 97.3 °F (36.3 °C) 64 18 (!) 189/94 99 %      Temp Source Heart Rate Source Patient Position - Orthostatic VS BP Location FiO2 (%)   05/15/24 0649 05/15/24 0830 05/15/24 0649 05/15/24 0649 --   Oral Monitor Sitting Left arm       Pain Score       05/15/24 0649       9             Orthostatic Vital Signs  Vitals:    05/15/24 0649 05/15/24 0830 05/15/24 0900   BP: (!) 189/94 160/77 163/85   Pulse: 64 65 66   Patient Position - Orthostatic VS: Sitting Lying Lying       Physical Exam  Vitals and nursing note reviewed.   Constitutional:       General: She is not in acute distress.     Appearance: She is well-developed.   HENT:      Head: Normocephalic and atraumatic.   Eyes:      General:         Right eye: No discharge.         Left eye: No discharge.      Extraocular Movements: Extraocular movements intact.      Conjunctiva/sclera: Conjunctivae normal.      Pupils: Pupils are equal, round, and reactive to light.      Comments: However patient complains of double vision   Neck:      Comments: Full range of motion of the neck however patient complains of pain with movement.  Cardiovascular:      Rate and Rhythm: Normal rate and regular rhythm.      Heart sounds: No murmur heard.  Pulmonary:      Effort: Pulmonary effort is normal. No respiratory distress.      Breath sounds: Normal breath sounds.   Abdominal:      Palpations: Abdomen is soft.      Tenderness: There is no abdominal tenderness.   Musculoskeletal:         General: No swelling.      Cervical back: Normal range of motion and neck supple.   Skin:     General: Skin is warm and dry.      Capillary Refill:  Capillary refill takes less than 2 seconds.   Neurological:      General: No focal deficit present.      Mental Status: She is alert and oriented to person, place, and time. Mental status is at baseline.      Cranial Nerves: No cranial nerve deficit.      Sensory: No sensory deficit.      Motor: No weakness.      Coordination: Coordination normal.      Gait: Gait normal.      Comments: Cranial nerves II through XII intact.  Extraocular movement intact.  Pupils equal reactive to light.  No nystagmus or saccades.  No deviation of gaze.  Muscle strength 5 out of 5 in upper and lower extremities.  Sensation intact to light touch in bilateral upper and lower extremities.  Able to ambulate without assistance.   Psychiatric:         Mood and Affect: Mood normal.         ED Medications  Medications   dexamethasone (PF) (DECADRON) injection 10 mg (10 mg Intravenous Given 5/15/24 0735)   metoclopramide (REGLAN) injection 10 mg (10 mg Intravenous Given 5/15/24 0735)   diphenhydrAMINE (BENADRYL) injection 25 mg (25 mg Intravenous Given 5/15/24 0736)   magnesium sulfate 2 g/50 mL IVPB (premix) 2 g (0 g Intravenous Stopped 5/15/24 0946)   sodium chloride 0.9 % bolus 1,000 mL (0 mL Intravenous Stopped 5/15/24 0900)   acetaminophen (TYLENOL) tablet 975 mg (975 mg Oral Given 5/15/24 0740)   potassium chloride oral solution 40 mEq (40 mEq Oral Given 5/15/24 0858)   iohexol (OMNIPAQUE) 350 MG/ML injection (MULTI-DOSE) 85 mL (85 mL Intravenous Given 5/15/24 0845)       Diagnostic Studies  Results Reviewed       Procedure Component Value Units Date/Time    Basic metabolic panel [[id number]]  (Abnormal) Collected: 05/15/24 0734    Lab Status: Final result Specimen: Blood from Arm, Right Updated: 05/15/24 0813     Sodium 138 mmol/L      Potassium 3.1 mmol/L      Chloride 103 mmol/L      CO2 25 mmol/L      ANION GAP 10 mmol/L      BUN 12 mg/dL      Creatinine 0.52 mg/dL      Glucose 136 mg/dL      Calcium 9.1 mg/dL      eGFR 107  ml/min/1.73sq m     Narrative:      National Kidney Disease Foundation guidelines for Chronic Kidney Disease (CKD):     Stage 1 with normal or high GFR (GFR > 90 mL/min/1.73 square meters)    Stage 2 Mild CKD (GFR = 60-89 mL/min/1.73 square meters)    Stage 3A Moderate CKD (GFR = 45-59 mL/min/1.73 square meters)    Stage 3B Moderate CKD (GFR = 30-44 mL/min/1.73 square meters)    Stage 4 Severe CKD (GFR = 15-29 mL/min/1.73 square meters)    Stage 5 End Stage CKD (GFR <15 mL/min/1.73 square meters)  Note: GFR calculation is accurate only with a steady state creatinine    CBC and differential [[id number]]  (Abnormal) Collected: 05/15/24 0734    Lab Status: Final result Specimen: Blood from Arm, Right Updated: 05/15/24 0752     WBC 9.07 Thousand/uL      RBC 5.27 Million/uL      Hemoglobin 14.6 g/dL      Hematocrit 42.8 %      MCV 81 fL      MCH 27.7 pg      MCHC 34.1 g/dL      RDW 12.0 %      MPV 11.0 fL      Platelets 262 Thousands/uL      nRBC 0 /100 WBCs      Segmented % 65 %      Immature Grans % 1 %      Lymphocytes % 26 %      Monocytes % 6 %      Eosinophils Relative 2 %      Basophils Relative 0 %      Absolute Neutrophils 5.94 Thousands/µL      Absolute Immature Grans 0.05 Thousand/uL      Absolute Lymphocytes 2.36 Thousands/µL      Absolute Monocytes 0.51 Thousand/µL      Eosinophils Absolute 0.18 Thousand/µL      Basophils Absolute 0.03 Thousands/µL                    CTA head and neck with and without contrast   ED Interpretation by Andrew Corley MD (05/15 9943)   No significant change from previous.      Final Result by E. Alec Schoenberger, MD (05/15 1357)      No acute intracranial pathology. Mild chronic microangiopathy.   No significant stenosis or large vessel occlusion.   Stable 1 mm focal outpouching from the cavernous right internal carotid artery that could represent tiny aneurysm.            Workstation performed: [id number]               Procedures  Procedures      ED Course  ED Course as of  05/15/24 1419   Wed May 15, 2024   0836 Potassium(!): 3.1  repleted                                       Medical Decision Making  54-year-old female presents emergency department complaining of headache has been ongoing for the last 2 hours    DDx: Migraine, tension headache, doubt subarachnoid hemorrhage, subdural hematoma, epidural hematoma, PRES    Plan: BMP, CBC, CTaHN, migraine cocktail    Blood work is remarkable for hypokalemia.  Potassium has been repleted.  CBC unremarkable.  CTA head and neck overall unremarkable and shows stable 1 mm outpouching from right internal carotid artery that could potentially represent tiny aneurysm.  Patient has had previous workup with neurosurgery for this in the past.  Headache is completely resolved repeat evaluation.  Patient able to ambulate without difficulty complete resolution of symptoms.  Stable for discharge home given contact information for neurology for follow-up of chronic headaches.    Amount and/or Complexity of Data Reviewed  Labs: ordered. Decision-making details documented in ED Course.  Radiology: ordered.    Risk  OTC drugs.  Prescription drug management.          Disposition  Final diagnoses:   Headache   Hypokalemia     Time reflects when diagnosis was documented in both MDM as applicable and the Disposition within this note       Time User Action Codes Description Comment    5/15/2024  7:25 AM Jonathan Welsh [R51.9] Headache     5/15/2024  8:36 AM Jonathan Welsh [E87.6] Hypokalemia           ED Disposition       ED Disposition   Discharge    Condition   Stable    Date/Time   Wed May 15, 2024 10:54 AM    Comment   Kathie Zazueta discharge to home/self care.                   Follow-up Information       Follow up With Specialties Details Why Contact Info Additional Information    [facility], Nurse Practitioner   [address] [facility]  [address] [place] [id number]  [phone]       [facility]  Emergency Department Emergency Medicine Go to  If symptoms worsen [address] [facility] [id number] [phone] [facility] Emergency Department, [address], [place], [id number] [phone]    [facility]   [address]  [address]  [facility] [address] [phone] [facility], [address], [place], [place], [address] [phone]            Discharge Medication List as of 5/15/2024 10:56 AM        CONTINUE these medications which have NOT CHANGED    Details   !! amLODIPine (NORVASC) 5 mg tablet Take 1 tablet (5 mg total) by mouth daily, Starting Mon 10/9/2023, Normal      !! amLODIPine (NORVASC) 5 mg tablet Take 1 tablet (5 mg total) by mouth daily, Starting Mon 4/22/2024, Until Sun 7/21/2024, Normal      aspirin (ECOTRIN LOW STRENGTH) 81 mg EC tablet Take 1 tablet (81 mg total) by mouth daily, Starting Mon 3/18/2024, Normal      atorvastatin (LIPITOR) 40 mg tablet Take 1 tablet (40 mg total) by mouth daily with dinner, Starting Wed 5/24/2023, Normal      ezetimibe (ZETIA) 10 mg tablet TAKE 1 TABLET BY MOUTH EVERY DAY, Starting Sat 9/23/2023, Normal      lisinopril (ZESTRIL) 20 mg tablet Take 1 tablet (20 mg total) by mouth daily, Starting Wed 4/24/2024, Normal      omega-3-acid ethyl esters (LOVAZA) 1 g capsule Take 2 capsules (2 g total) by mouth 2 (two) times a day, Starting Fri 12/29/2023, Normal      potassium chloride (MICRO-K) 10 MEQ CR capsule Take 1 capsule (10 mEq total) by mouth daily, Starting Wed 1/3/2024, Normal       !! - Potential duplicate medications found. Please discuss with provider.            PDMP Review       None             ED Provider  Attending physically available and evaluated Kathie Zazueta. I managed the patient along with the ED Attending.    Electronically Signed by           Jonathan Welsh DO  05/15/24 9138

## 2024-05-15 NOTE — DISCHARGE INSTRUCTIONS
Kathie Zazueta was seen and evaluated today in the emergency department over your concern of headache.  The workup that we performed showed no subarachnoid hemorrhage or ruptured aneurysm, stable finding of aneurysm seen on previous CT scan.  Please return to the emergency department if you experience turn of your headaches or any other signs and symptoms that may be concerning to you.  Please follow-up with your primary care doctor within 1 day.  All questions were answered prior to discharge.  Thank you for choosing [facility] for your care.    Follow-up with neurology

## 2024-05-15 NOTE — Clinical Note
Kathie Zazueta was seen and treated in our emergency department on 5/15/2024.            as tolerated    Diagnosis: headache    Kathie  may return to work on return date.    She may return on this date: 05/16/2024         If you have any questions or concerns, please don't hesitate to call.      Jonathan Welsh, DO    ______________________________           _______________          _______________  Hospital Representative                              Date                                Time no

## 2024-05-16 ENCOUNTER — VBI (OUTPATIENT)
Dept: ADMINISTRATIVE | Facility: OTHER | Age: 54
End: 2024-05-16

## 2024-05-16 NOTE — TELEPHONE ENCOUNTER
05/16/24 3:06 PM    Patient contacted post ED visit, first outreach attempt made. Message was left for patient to return a call to the VBI Department at [facility]: Phone [phone].    Thank you.  Fang Trammell  [facility]

## 2024-05-17 NOTE — TELEPHONE ENCOUNTER
05/17/24 12:37 PM    Patient contacted post ED visit, second outreach attempt made. Message was left for patient to return a call to the VBI Department at [facility]: Phone [phone].    Thank you.  Fang Trammell  [facility]

## 2024-05-20 NOTE — TELEPHONE ENCOUNTER
05/20/24 2:24 PM    Patient contacted post ED visit, third outreach attempt made. Message was left for patient to return a call to either the VBI Department at [place]: Phone [phone] or the PCP office.     Thank you.  Bianca Loera MA  [facility]

## 2024-06-01 DIAGNOSIS — E78.5 DYSLIPIDEMIA: ICD-10-CM

## 2024-06-02 DIAGNOSIS — E78.5 DYSLIPIDEMIA: ICD-10-CM

## 2024-06-02 RX ORDER — OMEGA-3-ACID ETHYL ESTERS 1 G/1
2 CAPSULE, LIQUID FILLED ORAL 2 TIMES DAILY
Qty: 120 CAPSULE | Refills: 0 | Status: SHIPPED | OUTPATIENT
Start: 2024-06-02 | End: 2024-06-04

## 2024-06-04 RX ORDER — OMEGA-3-ACID ETHYL ESTERS 1 G/1
2 CAPSULE, LIQUID FILLED ORAL 2 TIMES DAILY
Qty: 120 CAPSULE | Refills: 0 | Status: SHIPPED | OUTPATIENT
Start: 2024-06-04

## 2024-06-19 ENCOUNTER — OFFICE VISIT (OUTPATIENT)
Dept: FAMILY MEDICINE CLINIC | Facility: CLINIC | Age: 54
End: 2024-06-19
Payer: COMMERCIAL

## 2024-06-19 VITALS
RESPIRATION RATE: 16 BRPM | DIASTOLIC BLOOD PRESSURE: 90 MMHG | WEIGHT: 177.4 LBS | OXYGEN SATURATION: 97 % | HEIGHT: 60 IN | HEART RATE: 70 BPM | BODY MASS INDEX: 34.83 KG/M2 | SYSTOLIC BLOOD PRESSURE: 140 MMHG | TEMPERATURE: 97.3 F

## 2024-06-19 DIAGNOSIS — I10 HYPERTENSION, ESSENTIAL, BENIGN: ICD-10-CM

## 2024-06-19 DIAGNOSIS — M54.2 NECK PAIN: Primary | ICD-10-CM

## 2024-06-19 DIAGNOSIS — R73.01 IFG (IMPAIRED FASTING GLUCOSE): ICD-10-CM

## 2024-06-19 DIAGNOSIS — Z12.31 ENCOUNTER FOR SCREENING MAMMOGRAM FOR MALIGNANT NEOPLASM OF BREAST: ICD-10-CM

## 2024-06-19 DIAGNOSIS — E87.6 HYPOKALEMIA: ICD-10-CM

## 2024-06-19 DIAGNOSIS — E78.5 DYSLIPIDEMIA: ICD-10-CM

## 2024-06-19 DIAGNOSIS — E55.9 VITAMIN D DEFICIENCY: ICD-10-CM

## 2024-06-19 DIAGNOSIS — Z12.4 CERVICAL CANCER SCREENING: ICD-10-CM

## 2024-06-19 DIAGNOSIS — I67.1 CEREBRAL ANEURYSM, NONRUPTURED: ICD-10-CM

## 2024-06-19 DIAGNOSIS — E04.1 NODULAR THYROID DISEASE: ICD-10-CM

## 2024-06-19 PROCEDURE — 99214 OFFICE O/P EST MOD 30 MIN: CPT | Performed by: FAMILY MEDICINE

## 2024-06-19 RX ORDER — OMEGA-3-ACID ETHYL ESTERS 1 G/1
2 CAPSULE, LIQUID FILLED ORAL 2 TIMES DAILY
Qty: 360 CAPSULE | Refills: 1 | Status: SHIPPED | OUTPATIENT
Start: 2024-06-19

## 2024-06-19 RX ORDER — NAPROXEN 375 MG/1
375 TABLET ORAL 2 TIMES DAILY PRN
Qty: 60 TABLET | Refills: 1 | Status: SHIPPED | OUTPATIENT
Start: 2024-06-19

## 2024-06-19 RX ORDER — ATORVASTATIN CALCIUM 40 MG/1
40 TABLET, FILM COATED ORAL
Qty: 90 TABLET | Refills: 3 | Status: SHIPPED | OUTPATIENT
Start: 2024-06-19

## 2024-06-19 RX ORDER — POTASSIUM CHLORIDE 750 MG/1
10 CAPSULE, EXTENDED RELEASE ORAL DAILY
Qty: 90 CAPSULE | Refills: 3 | Status: SHIPPED | OUTPATIENT
Start: 2024-06-19

## 2024-06-19 RX ORDER — ASPIRIN 81 MG/1
81 TABLET ORAL DAILY
Qty: 90 TABLET | Refills: 1 | Status: SHIPPED | OUTPATIENT
Start: 2024-06-19 | End: 2024-09-17

## 2024-06-19 RX ORDER — CYCLOBENZAPRINE HCL 5 MG
5 TABLET ORAL
Qty: 30 TABLET | Refills: 1 | Status: SHIPPED | OUTPATIENT
Start: 2024-06-19

## 2024-06-19 RX ORDER — AMLODIPINE BESYLATE 5 MG/1
5 TABLET ORAL DAILY
Qty: 90 TABLET | Refills: 3 | Status: SHIPPED | OUTPATIENT
Start: 2024-06-19

## 2024-06-19 RX ORDER — LISINOPRIL 20 MG/1
20 TABLET ORAL DAILY
Qty: 90 TABLET | Refills: 3 | Status: SHIPPED | OUTPATIENT
Start: 2024-06-19

## 2024-06-19 RX ORDER — EZETIMIBE 10 MG/1
10 TABLET ORAL DAILY
Qty: 90 TABLET | Refills: 3 | Status: SHIPPED | OUTPATIENT
Start: 2024-06-19

## 2024-06-19 NOTE — PROGRESS NOTES
Ambulatory Visit  Name: Kathie Zazueta      : 1970      MRN: [id number]  Encounter Provider: Taty Arriaga MD  Encounter Date: 2024   Encounter department: [facility]    Assessment & Plan   1. Neck pain  -     Diclofenac Sodium (VOLTAREN) 1 %; Apply 2 g topically 4 (four) times a day  -     cyclobenzaprine (FLEXERIL) 5 mg tablet; Take 1 tablet (5 mg total) by mouth daily at bedtime as needed for muscle spasms  -     naproxen (NAPROSYN) 375 mg tablet; Take 1 tablet (375 mg total) by mouth 2 (two) times a day as needed for mild pain  -     Ambulatory Referral to Physical Therapy; Future  2. Hypertension, essential, benign  -     amLODIPine (NORVASC) 5 mg tablet; Take 1 tablet (5 mg total) by mouth daily  -     aspirin (ECOTRIN LOW STRENGTH) 81 mg EC tablet; Take 1 tablet (81 mg total) by mouth daily  -     ezetimibe (ZETIA) 10 mg tablet; Take 1 tablet (10 mg total) by mouth daily  -     lisinopril (ZESTRIL) 20 mg tablet; Take 1 tablet (20 mg total) by mouth daily  -     CBC and differential; Future  -     Comprehensive metabolic panel; Future  -     TSH, 3rd generation with Free T4 reflex; Future  -     UA w Reflex to Microscopic w Reflex to Culture; Future; Expected date: 2024  3. Hypokalemia  -     potassium chloride (MICRO-K) 10 MEQ CR capsule; Take 1 capsule (10 mEq total) by mouth daily  -     Comprehensive metabolic panel; Future  4. Dyslipidemia  -     atorvastatin (LIPITOR) 40 mg tablet; Take 1 tablet (40 mg total) by mouth daily with dinner  -     omega-3-acid ethyl esters (LOVAZA) 1 g capsule; Take 2 capsules (2 g total) by mouth 2 (two) times a day  -     Comprehensive metabolic panel; Future  -     Lipid panel; Future  5. IFG (impaired fasting glucose)  -     Comprehensive metabolic panel; Future  -     TSH, 3rd generation with Free T4 reflex; Future  -     Hemoglobin A1C; Future  6. Cerebral aneurysm, nonruptured  -     Ambulatory referral to Neurology;  Future  7. Nodular thyroid disease  -     US thyroid; Future; Expected date: 06/19/2024  8. Vitamin D deficiency  -     Vitamin D 25 hydroxy; Future; Expected date: 06/19/2024  9. Encounter for screening mammogram for malignant neoplasm of breast  -     Mammo screening bilateral w 3d & cad; Future  10. Cervical cancer screening  -     Ambulatory Referral to Obstetrics / Gynecology; Future      Regarding her left neck pain and upper back pain, discussed with patient.  Patient is feeling better.  Patient education on using both arms and using it properly and resting it when possible.  Patient is to use ice.  Patient is prescribed Voltaren gel.  Also prescribed Naprosyn and a muscle relaxer as needed.  Patient is sent for physical therapy as well.  Regarding her hypertension as patient's blood pressure goes up when she gets nervous and was better when I repeated it in the values of there.  Patient denies any acute cardiovascular or neuro symptoms from it.  Patient will continue her current therapy as well as have low-salt low condiment diet etc.  Will hydrate well.  Will get her blood work and urine checked also.  Regarding her hypokalemia history patient's med is refilled.  Will check blood work.  Regarding her hyperlipidemia patient advised to lose weight with a better diet and exercise.  Less fats, starches and sweets.  Will check her labs.  Continue current therapy.  Regarding her impaired glucose, discussed with patient.  Patient advised to have less starches and sweets.  Lose weight with a better diet and exercise as tolerated.  Will check her labs including hemoglobin A1c.  Regarding her cerebral aneurysm, discussed with patient.  Patient has no acute symptoms.  And patient is referred to neuro as well.  Regarding her nodular thyroid, patient will get a thyroid sonogram as well as TFTs with her next blood work.  Regarding her vitamin D deficiency patient will supplement with vitamin D3 2-3000 daily.  And patient  will check her level with her next blood work.  Patient is sent for her mammogram.  Patient is sent to the GYN for Pap smear as well.  And all of patient's meds were refilled as per her request.  RTO 6 weeks for her annual physical and do the blood work and urine before.                  Depression Screening and Follow-up Plan: Patient was screened for depression during today's encounter. They screened negative with a PHQ-2 score of 0.      History of Present Illness     54-year-old female here for an evaluation of her neck pain left upper shoulder area last week.  Patient says she works at a [employer] and she has been using her left arm a lot more lately.  Patient says that her now and that she has taken some anti-inflammatories.  Patient also like to be evaluated for her blood pressure, lipids and sugar.  Patient was recently seen and had her cerebral aneurysm checked also which is stable back in May.  Patient needs a mammogram.  Patient needs a Pap smear with GYN.        Review of Systems   Constitutional:  Negative for chills, fatigue, fever and unexpected weight change.   HENT:  Negative for congestion, hearing loss and sore throat.    Eyes:  Negative for visual disturbance.   Respiratory:  Negative for cough and shortness of breath.    Cardiovascular:  Negative for chest pain and palpitations.   Gastrointestinal:  Negative for abdominal pain and blood in stool.   Genitourinary:  Negative for dysuria and hematuria.   Musculoskeletal:  Positive for back pain and neck pain.   Skin:  Negative for rash.   Neurological:  Negative for dizziness and headaches.   Psychiatric/Behavioral:  Negative for dysphoric mood. The patient is not nervous/anxious.        Objective     /90 (BP Location: Left arm, Patient Position: Sitting, Cuff Size: Large)   Pulse 70   Temp (!) 97.3 °F (36.3 °C) (Temporal)   Resp 16   Ht 5' (1.524 m)   Wt 80.5 kg (177 lb 6.4 oz)   LMP 02/01/2019 (Approximate)   SpO2 97%   BMI 34.65  kg/m²     Physical Exam  Vitals reviewed.   Constitutional:       General: She is not in acute distress.     Appearance: Normal appearance. She is obese. She is not ill-appearing.   HENT:      Head: Normocephalic and atraumatic.      Right Ear: Tympanic membrane, ear canal and external ear normal.      Left Ear: Tympanic membrane, ear canal and external ear normal.      Mouth/Throat:      Mouth: Mucous membranes are moist.      Pharynx: Oropharynx is clear.   Eyes:      Extraocular Movements: Extraocular movements intact.      Conjunctiva/sclera: Conjunctivae normal.   Neck:      Vascular: No carotid bruit.      Comments: Thyroid nodular on exam.  Cardiovascular:      Rate and Rhythm: Normal rate and regular rhythm.   Pulmonary:      Effort: Pulmonary effort is normal.      Breath sounds: Normal breath sounds.   Abdominal:      General: Bowel sounds are normal.      Palpations: Abdomen is soft.      Tenderness: There is no abdominal tenderness. There is no right CVA tenderness or left CVA tenderness.   Musculoskeletal:         General: No tenderness.      Right lower leg: No edema.      Left lower leg: No edema.      Comments: No calf tenderness bilateral.   Lymphadenopathy:      Cervical: No cervical adenopathy.   Skin:     General: Skin is warm.      Findings: No rash.   Neurological:      General: No focal deficit present.      Mental Status: She is alert and oriented to person, place, and time.   Psychiatric:         Mood and Affect: Mood normal.         Behavior: Behavior normal.         Thought Content: Thought content normal.       Administrative Statements

## 2024-07-05 ENCOUNTER — TELEPHONE (OUTPATIENT)
Dept: FAMILY MEDICINE CLINIC | Facility: CLINIC | Age: 54
End: 2024-07-05

## 2024-07-05 ENCOUNTER — TRANSITIONAL CARE MANAGEMENT (OUTPATIENT)
Dept: FAMILY MEDICINE CLINIC | Facility: CLINIC | Age: 54
End: 2024-07-05

## 2024-07-05 NOTE — TELEPHONE ENCOUNTER
Called and spoke with patient regarding scheduling an appointment with GYN. Patient says she is on vacation and will be returning tomorrow. Patient is asking that someone call her on Monday to schedule an appointment.

## 2024-07-24 ENCOUNTER — OFFICE VISIT (OUTPATIENT)
Dept: URGENT CARE | Age: 54
End: 2024-07-24
Payer: COMMERCIAL

## 2024-07-24 ENCOUNTER — APPOINTMENT (OUTPATIENT)
Dept: RADIOLOGY | Age: 54
End: 2024-07-24
Payer: COMMERCIAL

## 2024-07-24 VITALS
OXYGEN SATURATION: 100 % | DIASTOLIC BLOOD PRESSURE: 74 MMHG | SYSTOLIC BLOOD PRESSURE: 136 MMHG | HEIGHT: 60 IN | WEIGHT: 177 LBS | RESPIRATION RATE: 20 BRPM | BODY MASS INDEX: 34.75 KG/M2 | HEART RATE: 80 BPM | TEMPERATURE: 98.2 F

## 2024-07-24 DIAGNOSIS — R22.42 LOCALIZED SWELLING OF LEFT FOOT: Primary | ICD-10-CM

## 2024-07-24 DIAGNOSIS — S96.912A MUSCLE STRAIN OF FOOT, LEFT, INITIAL ENCOUNTER: ICD-10-CM

## 2024-07-24 DIAGNOSIS — R22.42 LOCALIZED SWELLING OF LEFT FOOT: ICD-10-CM

## 2024-07-24 PROCEDURE — 73630 X-RAY EXAM OF FOOT: CPT

## 2024-07-24 PROCEDURE — 99214 OFFICE O/P EST MOD 30 MIN: CPT

## 2024-07-24 RX ORDER — CLOTRIMAZOLE 1 G/ML
1 SOLUTION TOPICAL 2 TIMES DAILY
Qty: 15 ML | Refills: 0 | Status: SHIPPED | OUTPATIENT
Start: 2024-07-24

## 2024-07-24 NOTE — PATIENT INSTRUCTIONS
Xray left foot: Negative for acte fracture or dislocation identified by me.  If the radiologist has any positive findings, we will contact you.  You may monitor MyChart for your results.  Take 600 to 800 mg of ibuprofen every 8 hours.  Supplement with Tylenol 1000 mg every 8 hours as needed, alternating every 4 hours with ibuprofen.  Ice 20 minutes on 20 minutes off.  Elevate above the level of the heart whenever not in use.  If symptoms or not improved in 3 to 5 days follow-up with PCP or Ortho.  If symptoms worsen or new symptoms develop report to the emergency room immediately.      May use Lotrimin spray twice a day.   Wear surgical shoe as needed comfort and support.

## 2024-07-24 NOTE — PROGRESS NOTES
[facility]        NAME: Kathie Zazueta is a 54 y.o. female  : 1970    MRN: [id number]  DATE: 2024  TIME: 12:47 PM    Assessment and Plan   Localized swelling of left foot [R22.42]  1. Localized swelling of left foot  XR foot 3+ vw left    clotrimazole 1 % external solution    CANCELED: Orthopedic injury treatment      2. Muscle strain of foot, left, initial encounter  Ambulatory Referral to Orthopedic Surgery        Xray left foot: Negative for acte fracture or dislocation identified by me.  If the radiologist has any positive findings, we will contact you.  You may monitor [facility] for your results.  Take 600 to 800 mg of ibuprofen every 8 hours.  Supplement with Tylenol 1000 mg every 8 hours as needed, alternating every 4 hours with ibuprofen.  Ice 20 minutes on 20 minutes off.  Elevate above the level of the heart whenever not in use.  If symptoms or not improved in 3 to 5 days follow-up with PCP or Ortho.  If symptoms worsen or new symptoms develop report to the emergency room immediately.    Surgical shoe  May use Lotrimin spray twice a day.   Wear surgical shoe as needed comfort and support.    Patient Instructions       Follow up with PCP in 3-5 days.  Proceed to  ER if symptoms worsen.    If tests have been performed at [facility], our office will contact you with results if changes need to be made to the care plan discussed with you at the visit.  You can review your full results on [facility].    Chief Complaint     Chief Complaint   Patient presents with   • Foot Pain     Symptoms started yesterday with left foot feeling itchy, painful and slight swelling.          History of Present Illness       Chinese interperter [id number] utilized for encounter: Reports for the past 2 days, she has had increased pain, swelling, and itching to dorsal aspect of left foot, plantar surface of left toes 2, 3, 4.  Denies fall, injury, increased activity, or new shoes.  She is taken Tylenol for  pain with minimal relief, and has been applying antibiotic ointment.        Review of Systems   Review of Systems   Skin:  Negative for rash and wound.         Current Medications       Current Outpatient Medications:   •  amLODIPine (NORVASC) 5 mg tablet, Take 1 tablet (5 mg total) by mouth daily, Disp: 90 tablet, Rfl: 3  •  aspirin (ECOTRIN LOW STRENGTH) 81 mg EC tablet, Take 1 tablet (81 mg total) by mouth daily, Disp: 90 tablet, Rfl: 1  •  atorvastatin (LIPITOR) 40 mg tablet, Take 1 tablet (40 mg total) by mouth daily with dinner, Disp: 90 tablet, Rfl: 3  •  clotrimazole 1 % external solution, Apply 1 Application topically 2 (two) times a day, Disp: 15 mL, Rfl: 0  •  cyclobenzaprine (FLEXERIL) 5 mg tablet, Take 1 tablet (5 mg total) by mouth daily at bedtime as needed for muscle spasms, Disp: 30 tablet, Rfl: 1  •  Diclofenac Sodium (VOLTAREN) 1 %, Apply 2 g topically 4 (four) times a day, Disp: 100 g, Rfl: 1  •  ezetimibe (ZETIA) 10 mg tablet, Take 1 tablet (10 mg total) by mouth daily, Disp: 90 tablet, Rfl: 3  •  lisinopril (ZESTRIL) 20 mg tablet, Take 1 tablet (20 mg total) by mouth daily, Disp: 90 tablet, Rfl: 3  •  naproxen (NAPROSYN) 375 mg tablet, Take 1 tablet (375 mg total) by mouth 2 (two) times a day as needed for mild pain, Disp: 60 tablet, Rfl: 1  •  omega-3-acid ethyl esters (LOVAZA) 1 g capsule, Take 2 capsules (2 g total) by mouth 2 (two) times a day, Disp: 360 capsule, Rfl: 1  •  potassium chloride (MICRO-K) 10 MEQ CR capsule, Take 1 capsule (10 mEq total) by mouth daily, Disp: 90 capsule, Rfl: 3    Current Allergies     Allergies as of 07/24/2024   • (No Known Allergies)            The following portions of the patient's history were reviewed and updated as appropriate: allergies, current medications, past family history, past medical history, past social history, past surgical history and problem list.     Past Medical History:   Diagnosis Date   • Cardiac disorder    • Congenital heart defect      last assessed 3/3/15, resolved 3/3/15   • Endometrial polyp        Past Surgical History:   Procedure Laterality Date   • CARDIAC SURGERY      valve repair    •  SECTION      x 2   • ENDOMETRIAL BIOPSY      by suction    • FOOT SURGERY Left    • INSERTION OF INTRAUTERINE DEVICE (IUD)     • REMOVAL OF INTRAUTERINE DEVICE (IUD)         Family History   Problem Relation Age of Onset   • Heart disease Mother    • No Known Problems Father    • Heart disease Maternal Grandmother    • Coronary artery disease Family    • No Known Problems Sister    • No Known Problems Maternal Grandfather    • No Known Problems Paternal Grandmother    • No Known Problems Paternal Grandfather    • No Known Problems Sister    • No Known Problems Maternal Aunt    • No Known Problems Maternal Aunt    • No Known Problems Maternal Aunt    • No Known Problems Maternal Aunt    • No Known Problems Paternal Aunt    • No Known Problems Paternal Aunt    • No Known Problems Paternal Aunt    • No Known Problems Paternal Aunt          Medications have been verified.        Objective   /74   Pulse 80   Temp 98.2 °F (36.8 °C)   Resp 20   Ht 5' (1.524 m)   Wt 80.3 kg (177 lb)   LMP 2019 (Approximate)   SpO2 100%   BMI 34.57 kg/m²   Patient's last menstrual period was 2019 (approximate).       Physical Exam     Physical Exam  Vitals and nursing note reviewed.   Constitutional:       General: She is not in acute distress.     Appearance: Normal appearance. She is normal weight. She is not ill-appearing.   Cardiovascular:      Pulses: Normal pulses.   Pulmonary:      Effort: Pulmonary effort is normal. No respiratory distress.   Musculoskeletal:        Feet:    Feet:      Right foot:      Skin integrity: Skin integrity normal.      Left foot:      Skin integrity: Skin integrity normal. No ulcer, skin breakdown, erythema, warmth, dry skin or fissure.      Comments: Left foot: Tenderness to palpation over MTP of toes #2, 3, 4.   Positive pulse movement sensation, 5/5 strength.  Skin is intact, there is no redness, erythema, open areas, drainage, swelling, or ecchymosis.    Skin:     General: Skin is warm.   Neurological:      Mental Status: She is alert.

## 2024-08-06 ENCOUNTER — TELEPHONE (OUTPATIENT)
Dept: OBGYN CLINIC | Facility: CLINIC | Age: 54
End: 2024-08-06

## 2024-08-20 DIAGNOSIS — M54.2 NECK PAIN: ICD-10-CM

## 2024-08-28 ENCOUNTER — OFFICE VISIT (OUTPATIENT)
Dept: PODIATRY | Facility: CLINIC | Age: 54
End: 2024-08-28
Payer: COMMERCIAL

## 2024-08-28 VITALS
DIASTOLIC BLOOD PRESSURE: 71 MMHG | BODY MASS INDEX: 35.57 KG/M2 | HEIGHT: 60 IN | SYSTOLIC BLOOD PRESSURE: 160 MMHG | HEART RATE: 69 BPM | WEIGHT: 181.2 LBS

## 2024-08-28 DIAGNOSIS — M72.2 PLANTAR FASCIITIS, LEFT: Primary | ICD-10-CM

## 2024-08-28 DIAGNOSIS — M20.42 HAMMERTOE OF LEFT FOOT: ICD-10-CM

## 2024-08-28 DIAGNOSIS — S96.912A MUSCLE STRAIN OF FOOT, LEFT, INITIAL ENCOUNTER: ICD-10-CM

## 2024-08-28 DIAGNOSIS — R20.0 NUMBNESS AND TINGLING: ICD-10-CM

## 2024-08-28 DIAGNOSIS — R20.2 NUMBNESS AND TINGLING: ICD-10-CM

## 2024-08-28 DIAGNOSIS — G57.52 TARSAL TUNNEL SYNDROME, LEFT: ICD-10-CM

## 2024-08-28 PROCEDURE — 99204 OFFICE O/P NEW MOD 45 MIN: CPT | Performed by: PODIATRIST

## 2024-08-28 NOTE — PROGRESS NOTES
Name: Kathie Zazueta      : 1970      MRN: [id number]  Encounter Provider: Jordan Pacheco DPM  Encounter Date: 2024   Encounter department: [facility]    Assessment & Plan     1. Plantar fasciitis, left  -     EMG 2 limb lower extremity; Future; Expected date: 2024  -     MRI ankle/heel left wo contrast; Future; Expected date: 2024  -     Ambulatory referral to Physical Therapy; Future  2. Muscle strain of foot, left, initial encounter  -     Ambulatory Referral to Orthopedic Surgery  3. Tarsal tunnel syndrome, left  -     EMG 2 limb lower extremity; Future; Expected date: 2024  -     MRI ankle/heel left wo contrast; Future; Expected date: 2024  -     Ambulatory referral to Physical Therapy; Future  4. Hammertoe of left foot  5. Numbness and tingling  -     EMG 2 limb lower extremity; Future; Expected date: 2024  -     MRI ankle/heel left wo contrast; Future; Expected date: 2024  -     Ambulatory referral to Physical Therapy; Future      Given patient's previous issues with multiple surgical interventions would like to order repeat EMG nerve conduction study.    Will refer patient to physical therapy for further evaluation in addition to ordering an MRI for further evaluation.    I personally discussed with patient at the visit today:     The diagnosis of this encounter  2.   Possible etiologies of the diagnosis  3.   Treatment options including advantages and disadvantages of treatment with potential risks and complications associated with these treatments  4.   Prevention strategies and ways to decrease pain     I answered all of the patients questions.      My personal interpretation today of the x-rays that were taken show   X-rays do not show any acute osseous abnormalities noted currently.   There is bunion deformity does noted with lateral deviation of the hallux.  This is a nonweightbearing x-ray.  Small plantar calcaneal spur  noted  With calcification noted with likely within the plantar fascia.        Return in about 6 weeks (around 10/9/2024).    Subjective     Had 2 surgeries on the left ankle.  Both were for tarsal tunnel syndrome.  One was at [facility] and the other was at [facility].   Burning sensation to the foot.     Negative back issues from 2017 study . No evidence of tarsal tunnel syndrome.    MRI 2017, Patient had plantar fasciitis and heel spur.        Had gastroc recession and tarsal tunnel release medial ankle by .  Previous tarsal tunnel surgery .        Constitutional:  Negative for chills and fever.   Respiratory:  Negative for chest tightness and shortness of breath.    Gastrointestinal:  Negative for nausea and vomiting.     Current Outpatient Medications on File Prior to Visit   Medication Sig   • amLODIPine (NORVASC) 5 mg tablet Take 1 tablet (5 mg total) by mouth daily   • aspirin (ECOTRIN LOW STRENGTH) 81 mg EC tablet Take 1 tablet (81 mg total) by mouth daily   • atorvastatin (LIPITOR) 40 mg tablet Take 1 tablet (40 mg total) by mouth daily with dinner   • clotrimazole 1 % external solution Apply 1 Application topically 2 (two) times a day   • cyclobenzaprine (FLEXERIL) 5 mg tablet Take 1 tablet (5 mg total) by mouth daily at bedtime as needed for muscle spasms   • Diclofenac Sodium (VOLTAREN) 1 % Apply 2 g topically 4 (four) times a day   • ezetimibe (ZETIA) 10 mg tablet Take 1 tablet (10 mg total) by mouth daily   • lisinopril (ZESTRIL) 20 mg tablet Take 1 tablet (20 mg total) by mouth daily   • naproxen (NAPROSYN) 375 mg tablet TAKE 1 TABLET (375 MG TOTAL) BY MOUTH 2 (TWO) TIMES A DAY AS NEEDED FOR MILD PAIN   • omega-3-acid ethyl esters (LOVAZA) 1 g capsule Take 2 capsules (2 g total) by mouth 2 (two) times a day   • potassium chloride (MICRO-K) 10 MEQ CR capsule Take 1 capsule (10 mEq total) by mouth daily       Objective     /71   Pulse 69   Ht 5' (1.524 m)   Wt 82.2 kg (181 lb 3.2 oz)    [facility] 02/01/2019 (Approximate)   BMI 35.39 kg/m²       Previous incision noted at the lower leg  Left for  Gastrocnemius recession.    Also incision site noted along the medial aspect of the tarsal tunnel.  There is some tenderness on palpation noted to this area.  There is positive Tinel sign noted on examination.  There is  Hammertoe contracture noted to the lesser digits.  There are some numbness and tingling noted into the toes.  There is pain on palpation noted at the plantar medial tubercle of the calcaneus.  Tenderness with activation of the windlass mechanism.  No other areas of acute discomfort or tenderness on examination.  Squeeze of the calcaneus does not cause significant pain or discomfort on examination no tenderness with tib-fib squeeze.   Neurovascular status otherwise is  DP and PT pulses are palpable bilaterally.

## 2024-09-23 ENCOUNTER — HOSPITAL ENCOUNTER (OUTPATIENT)
Dept: RADIOLOGY | Facility: HOSPITAL | Age: 54
Discharge: HOME | End: 2024-09-23
Attending: PODIATRIST
Payer: COMMERCIAL

## 2024-09-23 DIAGNOSIS — R20.0 NUMBNESS AND TINGLING: ICD-10-CM

## 2024-09-23 DIAGNOSIS — G57.52 TARSAL TUNNEL SYNDROME, LEFT: ICD-10-CM

## 2024-09-23 DIAGNOSIS — R20.2 NUMBNESS AND TINGLING: ICD-10-CM

## 2024-09-23 DIAGNOSIS — M72.2 PLANTAR FASCIITIS, LEFT: ICD-10-CM

## 2024-09-23 PROCEDURE — 73721 MRI JNT OF LWR EXTRE W/O DYE: CPT

## 2024-09-30 NOTE — RESULT ENCOUNTER NOTE
MRI was reviewed which does show Evidence of chronic plantar fasciitis in addition to tenosynovitis of the posterior tibial tendon as well as disproportionate fatty atrophy of the abductor digiti minimi muscle suggestive of chronic Baxters neuropathy.    Will review at next appt.

## 2024-11-18 ENCOUNTER — OFFICE VISIT (OUTPATIENT)
Dept: PODIATRY | Facility: CLINIC | Age: 54
End: 2024-11-18
Payer: COMMERCIAL

## 2024-11-18 VITALS
BODY MASS INDEX: 35.97 KG/M2 | SYSTOLIC BLOOD PRESSURE: 171 MMHG | WEIGHT: 183.2 LBS | HEART RATE: 70 BPM | DIASTOLIC BLOOD PRESSURE: 75 MMHG | HEIGHT: 60 IN

## 2024-11-18 DIAGNOSIS — M72.2 PLANTAR FASCIITIS, LEFT: Primary | ICD-10-CM

## 2024-11-18 DIAGNOSIS — M20.42 HAMMERTOE OF LEFT FOOT: ICD-10-CM

## 2024-11-18 DIAGNOSIS — R20.0 NUMBNESS AND TINGLING: ICD-10-CM

## 2024-11-18 DIAGNOSIS — R20.2 NUMBNESS AND TINGLING: ICD-10-CM

## 2024-11-18 DIAGNOSIS — M20.12 HAV (HALLUX ABDUCTO VALGUS), LEFT: ICD-10-CM

## 2024-11-18 DIAGNOSIS — G57.52 TARSAL TUNNEL SYNDROME, LEFT: ICD-10-CM

## 2024-11-18 PROCEDURE — 99213 OFFICE O/P EST LOW 20 MIN: CPT | Performed by: PODIATRIST

## 2024-11-18 PROCEDURE — 20550 NJX 1 TENDON SHEATH/LIGAMENT: CPT | Performed by: PODIATRIST

## 2024-11-18 RX ORDER — DEXAMETHASONE SODIUM PHOSPHATE 4 MG/ML
2 INJECTION, SOLUTION INTRA-ARTICULAR; INTRALESIONAL; INTRAMUSCULAR; INTRAVENOUS; SOFT TISSUE ONCE
Status: COMPLETED | OUTPATIENT
Start: 2024-11-18 | End: 2024-11-18

## 2024-11-18 RX ORDER — LIDOCAINE HYDROCHLORIDE 10 MG/ML
0.5 INJECTION, SOLUTION EPIDURAL; INFILTRATION; INTRACAUDAL; PERINEURAL ONCE
Status: COMPLETED | OUTPATIENT
Start: 2024-11-18 | End: 2024-11-18

## 2024-11-18 RX ORDER — TRIAMCINOLONE ACETONIDE 40 MG/ML
20 INJECTION, SUSPENSION INTRA-ARTICULAR; INTRAMUSCULAR ONCE
Status: COMPLETED | OUTPATIENT
Start: 2024-11-18 | End: 2024-11-18

## 2024-11-18 RX ADMIN — LIDOCAINE HYDROCHLORIDE 0.5 ML: 10 INJECTION, SOLUTION EPIDURAL; INFILTRATION; INTRACAUDAL; PERINEURAL at 15:51

## 2024-11-18 RX ADMIN — TRIAMCINOLONE ACETONIDE 20 MG: 40 INJECTION, SUSPENSION INTRA-ARTICULAR; INTRAMUSCULAR at 15:51

## 2024-11-18 RX ADMIN — DEXAMETHASONE SODIUM PHOSPHATE 2 MG: 4 INJECTION, SOLUTION INTRA-ARTICULAR; INTRALESIONAL; INTRAMUSCULAR; INTRAVENOUS; SOFT TISSUE at 15:50

## 2024-11-18 NOTE — PROGRESS NOTES
Name: Kathie Zazueta      : 1970      MRN: [id number]  Encounter Provider: Jordan Pacheco DPM  Encounter Date: 2024   Encounter department: [facility]    Assessment & Plan     1. Plantar fasciitis, left  -     dexamethasone (DECADRON) injection 2 mg  -     triamcinolone acetonide (Kenalog-40) 40 mg/mL injection 20 mg  -     lidocaine (PF) (XYLOCAINE-MPF) 1 % injection 0.5 mL  -     Foot/lower extremity injection  2. Tarsal tunnel syndrome, left  3. Numbness and tingling  4. Hammertoe of left foot  5. Hav (hallux abducto valgus), left      Foot/lower extremity injection    Performed by: Jordan Pacheco DPM  Authorized by: Jordan Pacheco DPM    Procedure:     Other Assisting Provider: No      Verbal consent obtained?: Yes      Risks and benefits: Risks, benefits and alternatives were discussed      Consent given by:  Patient    Patient states understanding of procedure being performed: Yes      Patient identity confirmed:  Verbally with patient    Supporting Documentation:     Indications:  Pain    Procedure Details:    Prep: patient was prepped and draped in usual sterile fashion                Ethyl Chloride was applied      Needle size: 25 G G    Ultrasound Guidance: no      Approach:  Medial    Laterality:  Left    Location: aponeurosis      Aponeurosis Structures: Plantar fascia origin      Injection Information:       Patient tolerance:  Patient tolerated the procedure well with no immediate complications    Dressing: sterile dressing applied          MRI was reviewed which does show Evidence of chronic plantar fasciitis in addition to tenosynovitis of the posterior tibial tendon as well as disproportionate fatty atrophy of the abductor digiti minimi muscle suggestive of chronic Baxters neuropathy.      Injection completed today to the plantar fascia.    Patient has EMG nerve conduction study scheduled for March.  I will plan on bring her back  for reevaluation in 2 months to see how she is doing at that time.  Discussed with her options of injection closer to the Baxters nerve entrapment area in addition to repeating of the plantar fascial injection pending on how she does.        Return in about 2 months (around 1/18/2025).    Subjective     Patient had previously to surgical interventions of the left ankle.  Both were done for tarsal tunnel syndrome.  1 was at [facility] the other was at [facility].  Patient continues to have burning sensation to the foot.  She has negative back issues from a 2017 study.  She also has had history of plantar fasciitis and heel spur that was noted on the 2017 MRI.  Patient had a gastroc recession and tarsal tunnel release as well as previous tarsal tunnel surgical intervention.            Constitutional:  Negative for chills and fever.   Respiratory:  Negative for chest tightness and shortness of breath.    Gastrointestinal:  Negative for nausea and vomiting.     Current Outpatient Medications on File Prior to Visit   Medication Sig    amLODIPine (NORVASC) 5 mg tablet Take 1 tablet (5 mg total) by mouth daily    atorvastatin (LIPITOR) 40 mg tablet Take 1 tablet (40 mg total) by mouth daily with dinner    clotrimazole 1 % external solution Apply 1 Application topically 2 (two) times a day    cyclobenzaprine (FLEXERIL) 5 mg tablet Take 1 tablet (5 mg total) by mouth daily at bedtime as needed for muscle spasms    Diclofenac Sodium (VOLTAREN) 1 % Apply 2 g topically 4 (four) times a day    ezetimibe (ZETIA) 10 mg tablet Take 1 tablet (10 mg total) by mouth daily    lisinopril (ZESTRIL) 20 mg tablet Take 1 tablet (20 mg total) by mouth daily    naproxen (NAPROSYN) 375 mg tablet TAKE 1 TABLET (375 MG TOTAL) BY MOUTH 2 (TWO) TIMES A DAY AS NEEDED FOR MILD PAIN    omega-3-acid ethyl esters (LOVAZA) 1 g capsule Take 2 capsules (2 g total) by mouth 2 (two) times a day    potassium chloride (MICRO-K) 10 MEQ CR  capsule Take 1 capsule (10 mEq total) by mouth daily    aspirin (ECOTRIN LOW STRENGTH) 81 mg EC tablet Take 1 tablet (81 mg total) by mouth daily       Objective     BP (!) 171/75   Pulse 70   Ht 5' (1.524 m)   Wt 83.1 kg (183 lb 3.2 oz)   LMP 02/01/2019 (Approximate)   BMI 35.78 kg/m²     Tenderness on palpation noted at the distal portion of the incision at the tarsal tunnel previous intervention site.  This is located at the distal portion of the tarsal tunnel and at the level of lyssa pedis.  There is tenderness on palpation noted on the plantar surface of the heel as well at the level of the plantar medial tubercle.  No tenderness with medial lateral squeeze of the calcaneus.  Positive Tinel sign noted on exam of the tarsal tunnel medially.  There is previous history of surgical incision noted.  There is some tingling and numbness noted into the toes.  No tenderness noted otherwise.  No tenderness with range of motion of the foot or ankle.  There is some mild limitation in dorsiflexion noted at the level of the ankle joint.

## 2024-12-05 ENCOUNTER — OFFICE VISIT (OUTPATIENT)
Dept: NEUROLOGY | Facility: CLINIC | Age: 54
End: 2024-12-05
Payer: COMMERCIAL

## 2024-12-05 VITALS
BODY MASS INDEX: 35.73 KG/M2 | DIASTOLIC BLOOD PRESSURE: 102 MMHG | SYSTOLIC BLOOD PRESSURE: 170 MMHG | HEART RATE: 82 BPM | HEIGHT: 60 IN | WEIGHT: 182 LBS | OXYGEN SATURATION: 97 % | TEMPERATURE: 98 F

## 2024-12-05 DIAGNOSIS — R51.9 HEADACHE DUE TO HYPERTENSION: Primary | ICD-10-CM

## 2024-12-05 DIAGNOSIS — R51.9 CEPHALALGIA: ICD-10-CM

## 2024-12-05 DIAGNOSIS — G47.33 OSA (OBSTRUCTIVE SLEEP APNEA): ICD-10-CM

## 2024-12-05 DIAGNOSIS — I10 HYPERTENSION, ESSENTIAL, BENIGN: ICD-10-CM

## 2024-12-05 DIAGNOSIS — I67.1 CEREBRAL ANEURYSM, NONRUPTURED: ICD-10-CM

## 2024-12-05 DIAGNOSIS — I10 HEADACHE DUE TO HYPERTENSION: Primary | ICD-10-CM

## 2024-12-05 PROCEDURE — 99205 OFFICE O/P NEW HI 60 MIN: CPT | Performed by: PSYCHIATRY & NEUROLOGY

## 2024-12-05 NOTE — PROGRESS NOTES
Name: Kathie Zazueta      : 1970      MRN: [id number]  Encounter Provider: Karoline Coffey MD  Encounter Date: 2024   Encounter department: [facility] [place]    :  Assessment & Plan  Headache due to hypertension  Patient is a very pleasant 54-year-old female with history of hypertension, hyperlipidemia, hypothyroidism, menorrhagia, obesity, ASD secundum and valvular disease referred here for headaches in the setting of cerebral aneurysm.     Patient with episodes of severe pressure of the head associated with blurry vision, nausea and vomiting. She has been to the ED on several occasions and her BP is found to be elevated. CTA found a vascular abnormality which was initially concerning for aneurysm vs infundibulum. She was evaluated by neurosurgery who reviewed imaging and agreed this was likely an infundibulum. Today patient reports that her headaches have resolved and they occur maybe once a year. She reports snoring and occasionally waking herself up from sleep. She also reports daytime somnolence and weight gain over the years. Physical exam normal.     At this time patient with episodic headaches likely secondary to her high blood pressure.     Plan:  Will do an MRI to evaluate for any vascular insults as a result of her HA   Additional workup   Referral to sleep medicine   No medication changes at this time  Can take tylenol or naproxen at HA onset.   Advised patient to prioritize blood pressure control.   Lifestyle recommendations:  Recommend drinking at least 64 oz of water daily   Recommend maintaining a regular sleep schedule 84 7 to 9 hours of quality sleep per night  Recommend avoiding stress  Recommend engaging in regular physical activity  Recommend following a healthy diet and identifying foods second, may trigger headaches such as alcohol, caffeine, chocolate, H cheeses, processed foods and artificial sweeteners  Recommend maintaining a good  posture  Advised to keep track of headache patterns including triggers, alleviating/exacerbating factors, duration and response to medication  Follow-up in 4 months        Cerebral aneurysm, nonruptured    Orders:    Ambulatory referral to Neurology    MRI brain without contrast; Future    Hypertension, essential, benign    Orders:    MRI brain without contrast; Future    ESMER (obstructive sleep apnea)  Patient reports snoring with episodes of waking herself up in her sleep.     Referred to sleep medicine   Orders:    Ambulatory Referral to Sleep Medicine; Future    MRI brain without contrast; Future    Cephalalgia    Orders:    MRI brain without contrast; Future        History of Present Illness   HPI    Patient is a very pleasant 54-year-old female with history of hypertension, hyperlipidemia, hypothyroidism, menorrhagia, obesity, ASD secundum and valvular disease referred here for headaches in the setting of cerebral aneurysm.     Patient evaluated by ED on 5/22/23 for sudden onset HA, neck pain nausea and vision changes. /95. Seen by neurosurgery in October of 2023 for findings below. Thought to be an infundibulum.     Takes BP at home. Averages around 130, sometimes in 140s.  Reports mild headaches in occipital area associated with blurry vision.     CTA repeat this year because had headache again BP at the time was 189/94    Headaches occur about once in a year. Reports a pressure throughout the whole head. Feels like her eyes are going to pop out. Has nausea and vomiting with blurry vision. Feels swelling of the hands and feet. When she gets headaches take naproxen or tylenol which help     Lifestyle:    Sleep :  averages: 7 pm - 4 am   Problems falling asleep?:   No  Problems staying asleep?:  No  Do you snore while asleep?Yes, she wakes herself up   Do you wake up with headaches? No  Have you ever had a sleep study done? 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 No   History of low blood pressure or cardiac arrythmias?: High BP     Family history:  Family history of  heart disease.    Workup:  CTA (5/15/24): No acute intracranial pathology. Mild chronic microangiopathy.  No significant stenosis or large vessel occlusion. Stable 1 mm focal outpouching from the cavernous right internal carotid artery that could represent tiny aneurysm.    CTA Head and neck, 8/24/2023: No acute intracranial abnormality. No vessel wall irregularity identified in right vertebral artery V3 segment in right C2 transverse foramen. Unchanged mildly hypoplastic right vertebral artery which becomes more diminutive after early PICA takeoff in V4 segment. Unchanged tiny focal outpouching along the medial aspect of right ICA cavernous segment, may represent tiny aneurysm.       Review of Systems   Constitutional:  Negative for appetite change, fatigue and fever.   HENT: Negative.  Negative for hearing loss, tinnitus, trouble swallowing and voice change.    Eyes: Negative.  Negative for photophobia, pain and visual disturbance.   Respiratory: Negative.  Negative for shortness of breath.    Cardiovascular: Negative.  Negative for palpitations.   Gastrointestinal: Negative.  Negative for nausea and vomiting.   Endocrine: Negative.  Negative for cold intolerance.   Genitourinary: Negative.  Negative for dysuria, frequency and urgency.   Musculoskeletal:  Negative for back pain, gait problem, myalgias, neck pain and neck stiffness.   Skin: Negative.  Negative for rash.   Allergic/Immunologic: Negative.    Neurological:  Negative for dizziness, tremors, seizures, syncope, facial  asymmetry, speech difficulty, weakness, light-headedness, numbness and headaches.        Pt states she hasn't had a headache for a month. Was previously in the hospital for headache that are not occurring anymore.   Hematological: Negative.  Does not bruise/bleed easily.   Psychiatric/Behavioral: Negative.  Negative for confusion, hallucinations and sleep disturbance.    All other systems reviewed and are negative.    I have personally reviewed the MA's review of systems and made changes as necessary.         Objective   BP (!) 170/102 (BP Location: Right arm, Patient Position: Sitting, Cuff Size: Standard)   Pulse 82   Temp 98 °F (36.7 °C) (Temporal)   Ht 5' (1.524 m)   Wt 82.6 kg (182 lb)   LMP 02/01/2019 (Approximate)   SpO2 97%   BMI 35.54 kg/m²     Physical Exam  Eyes:      Extraocular Movements: EOM normal.      Pupils: Pupils are equal, round, and reactive to light.   Neurological:      Mental Status: She is oriented to person, place, and time.      Motor: Motor strength is normal.     Coordination: Finger-Nose-Finger Test and Romberg Test normal.      Gait: Gait is intact.      Deep Tendon Reflexes:      Reflex Scores:       Bicep reflexes are 1+ on the right side and 1+ on the left side.       Brachioradialis reflexes are 1+ on the right side and 1+ on the left side.       Patellar reflexes are 1+ on the right side and 1+ on the left side.       Achilles reflexes are 0 on the right side and 0 on the left side.  Psychiatric:         Speech: Speech normal.       Neurologic Exam     Mental Status   Oriented to person, place, and time.   Attention: normal. Concentration: normal.   Speech: speech is normal     Cranial Nerves     CN III, IV, VI   Pupils are equal, round, and reactive to light.  Extraocular motions are normal.     CN V   Facial sensation intact.     CN VII   Facial expression full, symmetric.     CN VIII   CN VIII normal.     CN XI   CN XI normal.     Motor Exam     Strength   Strength 5/5  throughout.     Sensory Exam   Vibration normal.   Proprioception normal.   Right arm proprioception: normal  Left arm proprioception: normal    Gait, Coordination, and Reflexes     Gait  Gait: normal    Coordination   Romberg: negative  Finger to nose coordination: normal    Reflexes   Right brachioradialis: 1+  Left brachioradialis: 1+  Right biceps: 1+  Left biceps: 1+  Right patellar: 1+  Left patellar: 1+  Right achilles: 0  Left achilles: 0  Right Lopez: absent  Left Lopez: absent  Right ankle clonus: absent  Left ankle clonus: absent      Results/Data:  I have reviewed the results from CTA in detail with the patient.

## 2024-12-05 NOTE — ASSESSMENT & PLAN NOTE
Patient is a very pleasant 54-year-old female with history of hypertension, hyperlipidemia, hypothyroidism, menorrhagia, obesity, ASD secundum and valvular disease referred here for headaches in the setting of cerebral aneurysm.     Patient with episodes of severe pressure of the head associated with blurry vision, nausea and vomiting. She has been to the ED on several occasions and her BP is found to be elevated. CTA found a vascular abnormality which was initially concerning for aneurysm vs infundibulum. She was evaluated by neurosurgery who reviewed imaging and agreed this was likely an infundibulum. Today patient reports that her headaches have resolved and they occur maybe once a year. She reports snoring and occasionally waking herself up from sleep. She also reports daytime somnolence and weight gain over the years. Physical exam normal.     At this time patient with episodic headaches likely secondary to her high blood pressure.     Plan:  Will do an MRI to evaluate for any vascular insults as a result of her HA   Additional workup   Referral to sleep medicine   No medication changes at this time  Can take tylenol or naproxen at HA onset.   Advised patient to prioritize blood pressure control.   Lifestyle recommendations:  Recommend drinking at least 64 oz of water daily   Recommend maintaining a regular sleep schedule 84 7 to 9 hours of quality sleep per night  Recommend avoiding stress  Recommend engaging in regular physical activity  Recommend following a healthy diet and identifying foods second, may trigger headaches such as alcohol, caffeine, chocolate, H cheeses, processed foods and artificial sweeteners  Recommend maintaining a good posture  Advised to keep track of headache patterns including triggers, alleviating/exacerbating factors, duration and response to medication  Follow-up in 4 months

## 2024-12-18 DIAGNOSIS — I10 HYPERTENSION, ESSENTIAL, BENIGN: ICD-10-CM

## 2024-12-18 DIAGNOSIS — M54.2 NECK PAIN: ICD-10-CM

## 2024-12-19 RX ORDER — LISINOPRIL 20 MG/1
20 TABLET ORAL DAILY
Qty: 90 TABLET | Refills: 1 | Status: SHIPPED | OUTPATIENT
Start: 2024-12-19

## 2024-12-19 RX ORDER — AMLODIPINE BESYLATE 5 MG/1
5 TABLET ORAL DAILY
Qty: 90 TABLET | Refills: 1 | Status: SHIPPED | OUTPATIENT
Start: 2024-12-19

## 2024-12-19 RX ORDER — CYCLOBENZAPRINE HCL 5 MG
5 TABLET ORAL
Qty: 30 TABLET | Refills: 0 | Status: SHIPPED | OUTPATIENT
Start: 2024-12-19

## 2024-12-27 ENCOUNTER — HOSPITAL ENCOUNTER (OUTPATIENT)
Dept: RADIOLOGY | Facility: HOSPITAL | Age: 54
Discharge: HOME | End: 2024-12-27
Attending: PSYCHIATRY & NEUROLOGY
Payer: COMMERCIAL

## 2024-12-27 DIAGNOSIS — I10 HYPERTENSION, ESSENTIAL, BENIGN: ICD-10-CM

## 2024-12-27 DIAGNOSIS — G47.33 OSA (OBSTRUCTIVE SLEEP APNEA): ICD-10-CM

## 2024-12-27 DIAGNOSIS — I67.1 CEREBRAL ANEURYSM, NONRUPTURED: ICD-10-CM

## 2024-12-27 DIAGNOSIS — R51.9 CEPHALALGIA: ICD-10-CM

## 2024-12-27 PROCEDURE — 70551 MRI BRAIN STEM W/O DYE: CPT

## 2025-01-08 ENCOUNTER — RA CDI HCC (OUTPATIENT)
Dept: OTHER | Facility: HOSPITAL | Age: 55
End: 2025-01-08

## 2025-03-13 ENCOUNTER — OFFICE VISIT (OUTPATIENT)
Dept: OBGYN CLINIC | Facility: CLINIC | Age: 55
End: 2025-03-13
Payer: COMMERCIAL

## 2025-03-13 VITALS — WEIGHT: 176 LBS | BODY MASS INDEX: 34.55 KG/M2 | HEIGHT: 60 IN

## 2025-03-13 DIAGNOSIS — R10.2 PELVIC PAIN: ICD-10-CM

## 2025-03-13 DIAGNOSIS — Z01.419 ENCNTR FOR GYN EXAM (GENERAL) (ROUTINE) W/O ABN FINDINGS: Primary | ICD-10-CM

## 2025-03-13 DIAGNOSIS — Z12.31 ENCOUNTER FOR SCREENING MAMMOGRAM FOR MALIGNANT NEOPLASM OF BREAST: ICD-10-CM

## 2025-03-13 PROCEDURE — G0476 HPV COMBO ASSAY CA SCREEN: HCPCS | Performed by: OBSTETRICS & GYNECOLOGY

## 2025-03-13 PROCEDURE — G0145 SCR C/V CYTO,THINLAYER,RESCR: HCPCS | Performed by: OBSTETRICS & GYNECOLOGY

## 2025-03-13 PROCEDURE — S0610 ANNUAL GYNECOLOGICAL EXAMINA: HCPCS | Performed by: OBSTETRICS & GYNECOLOGY

## 2025-03-13 NOTE — PROGRESS NOTES
Kathie Zazueta   1970    CC:  Yearly exam     VISIT CONDUCTED IN [place]    S:  54 y.o. female here for yearly exam. She last saw me in 2020 for an annual.  She has been well overall since that time.      She is postmenopausal and has had no vaginal bleeding.  She denies vaginal discharge, itching, odor or dryness.     She has been having pain in her pelvis on and off for the last 3 months.  She states it is brief (seconds) when it comes and not increasing in intensity but increasing in frequency.  She denies activity that causes it.  She denies changes in bowel habits including diarrhea or constipation.  She denies changes in bladder habits.  Discussed pelvic US to rule out gyn pathology (though not suspicious for this) and then evaluation by PT and then referral to GI if both of those are unrevealing.  She is open to this.      Sexual activity: She is sexually active without pain, bleeding or dryness.     Last Pap: 10/1/2019 - normal/negative HPV  Last Mammo: 6/6/2023 - BIRAD-2  Last Colonoscopy: 2/24/2022 - normal; 10 years  Last DEXA: never    We reviewed ASCCP guidelines for Pap testing.     Current Outpatient Medications:     amLODIPine (NORVASC) 5 mg tablet, Take 1 tablet (5 mg total) by mouth daily, Disp: 90 tablet, Rfl: 1    aspirin (ECOTRIN LOW STRENGTH) 81 mg EC tablet, Take 1 tablet (81 mg total) by mouth daily, Disp: 90 tablet, Rfl: 1    atorvastatin (LIPITOR) 40 mg tablet, Take 1 tablet (40 mg total) by mouth daily with dinner, Disp: 90 tablet, Rfl: 3    Diclofenac Sodium (VOLTAREN) 1 %, Apply 2 g topically 4 (four) times a day, Disp: 100 g, Rfl: 1    lisinopril (ZESTRIL) 20 mg tablet, Take 1 tablet (20 mg total) by mouth daily, Disp: 90 tablet, Rfl: 1    naproxen (NAPROSYN) 375 mg tablet, TAKE 1 TABLET (375 MG TOTAL) BY MOUTH 2 (TWO) TIMES A DAY AS NEEDED FOR MILD PAIN, Disp: 60 tablet, Rfl: 1    omega-3-acid ethyl esters (LOVAZA) 1 g capsule, Take 2 capsules (2 g total) by mouth 2 (two) times a  day, Disp: 360 capsule, Rfl: 1  Social History     Socioeconomic History    Marital status: /Civil Union     Spouse name: Not on file    Number of children: Not on file    Years of education: Not on file    Highest education level: Not on file   Occupational History    Not on file   Tobacco Use    Smoking status: Never    Smokeless tobacco: Never   Vaping Use    Vaping status: Never Used   Substance and Sexual Activity    Alcohol use: No    Drug use: No    Sexual activity: Yes     Partners: Male     Birth control/protection: None     Comment:    Other Topics Concern    Not on file   Social History Narrative    Daily coffee consumption, (1 cup/day)     Sedentary lifestyle      Social Drivers of Health     Financial Resource Strain: Low Risk  (5/18/2021)    Overall Financial Resource Strain (CARDIA)     Difficulty of Paying Living Expenses: Not hard at all   Food Insecurity: No Food Insecurity (5/18/2021)    Hunger Vital Sign     Worried About Running Out of Food in the Last Year: Never true     Ran Out of Food in the Last Year: Never true   Transportation Needs: No Transportation Needs (5/18/2021)    PRAPARE - Transportation     Lack of Transportation (Medical): No     Lack of Transportation (Non-Medical): No   Physical Activity: Inactive (9/18/2019)    Exercise Vital Sign     Days of Exercise per Week: 0 days     Minutes of Exercise per Session: 0 min   Stress: Stress Concern Present (9/18/2019)    [employer] - Occupational Stress Questionnaire     Feeling of Stress : To some extent   Social Connections: Unknown (9/18/2019)    Social Connection and Isolation Panel [NHANES]     Frequency of Communication with Friends and Family: Patient declined     Frequency of Social Gatherings with Friends and Family: Patient declined     Attends Taoism Services: Patient declined     Active Member of Clubs or Organizations: Patient declined     Attends Club or Organization Meetings:  "Patient declined     Marital Status: Patient declined   Intimate Partner Violence: Unknown (9/18/2019)    Humiliation, Afraid, Rape, and Kick questionnaire     Fear of Current or Ex-Partner: Patient declined     Emotionally Abused: Patient declined     Physically Abused: Patient declined     Sexually Abused: Patient declined   Housing Stability: Not on file     Family History   Problem Relation Age of Onset    Heart disease Mother     No Known Problems Father     Heart disease Maternal Grandmother     Coronary artery disease Family     No Known Problems Sister     No Known Problems Maternal Grandfather     No Known Problems Paternal Grandmother     No Known Problems Paternal Grandfather     No Known Problems Sister     No Known Problems Maternal Aunt     No Known Problems Maternal Aunt     No Known Problems Maternal Aunt     No Known Problems Maternal Aunt     No Known Problems Paternal Aunt     No Known Problems Paternal Aunt     No Known Problems Paternal Aunt     No Known Problems Paternal Aunt      Past Medical History:   Diagnosis Date    Cardiac disorder     Congenital heart defect     last assessed 3/3/15, resolved 3/3/15    Endometrial polyp         Review of Systems   Respiratory: Negative.    Cardiovascular: Negative.    Gastrointestinal: Negative for constipation and diarrhea.   Genitourinary: Negative for difficulty urinating, pelvic pain, vaginal bleeding, vaginal discharge, itching or odor.    O:  Height 5' 0.24\" (1.53 m), weight 79.8 kg (176 lb), last menstrual period 02/01/2019, not currently breastfeeding.    Patient appears well and is not in distress  Neck is supple without masses  Breasts are symmetrical without mass, tenderness, nipple discharge, skin changes or adenopathy.   Abdomen is soft and nontender without masses.   External genitals are normal without lesions or rashes.  Urethral meatus and urethra are normal  Bladder is normal to palpation  Vagina is normal without discharge or " bleeding.   Cervix is normal without discharge or lesion.   Uterus is normal, mobile, nontender without palpable mass.  Adnexa are normal, nontender, without palpable mass.     A:   Yearly exam.     P:   Pap with HPV sent - will call with results  Mammo ordered by PCP - encouraged   Colonoscopy due 2032   DEXA due age 64 unless new risk factors develop   Pelvic US ordered; if negative, will refer to pelvic PT    RTO one year for yearly exam or sooner as needed.

## 2025-03-18 ENCOUNTER — RESULTS FOLLOW-UP (OUTPATIENT)
Dept: LABOR AND DELIVERY | Facility: HOSPITAL | Age: 55
End: 2025-03-18

## 2025-03-18 LAB
LAB AP GYN PRIMARY INTERPRETATION: NORMAL
Lab: NORMAL

## 2025-03-19 ENCOUNTER — TELEPHONE (OUTPATIENT)
Dept: PODIATRY | Facility: CLINIC | Age: 55
End: 2025-03-19

## 2025-03-19 DIAGNOSIS — G57.52 TARSAL TUNNEL SYNDROME, LEFT: Primary | ICD-10-CM

## 2025-03-19 DIAGNOSIS — R20.0 NUMBNESS AND TINGLING: ICD-10-CM

## 2025-03-19 DIAGNOSIS — R20.2 NUMBNESS AND TINGLING: ICD-10-CM

## 2025-04-08 DIAGNOSIS — E78.5 DYSLIPIDEMIA: ICD-10-CM

## 2025-04-09 RX ORDER — OMEGA-3-ACID ETHYL ESTERS 1 G/1
2 CAPSULE, LIQUID FILLED ORAL 2 TIMES DAILY
Qty: 360 CAPSULE | Refills: 1 | Status: SHIPPED | OUTPATIENT
Start: 2025-04-09

## 2025-06-23 DIAGNOSIS — E78.5 DYSLIPIDEMIA: ICD-10-CM

## 2025-06-25 RX ORDER — OMEGA-3-ACID ETHYL ESTERS 1 G/1
2 CAPSULE, LIQUID FILLED ORAL 2 TIMES DAILY
Qty: 120 CAPSULE | Refills: 0 | Status: SHIPPED | OUTPATIENT
Start: 2025-06-25

## 2025-06-25 NOTE — TELEPHONE ENCOUNTER
I am sending her a 30-day supply only.  Patient does need an appointment.  Her last and only appointment with me was last year in April.  Thank you